# Patient Record
Sex: FEMALE | Race: WHITE | Employment: OTHER | URBAN - METROPOLITAN AREA
[De-identification: names, ages, dates, MRNs, and addresses within clinical notes are randomized per-mention and may not be internally consistent; named-entity substitution may affect disease eponyms.]

---

## 2017-01-13 RX ORDER — SIMVASTATIN 20 MG
TABLET ORAL
Qty: 90 TABLET | Refills: 0 | Status: SHIPPED | OUTPATIENT
Start: 2017-01-13 | End: 2017-05-04

## 2017-01-25 ENCOUNTER — MED REC SCAN ONLY (OUTPATIENT)
Dept: FAMILY MEDICINE CLINIC | Facility: CLINIC | Age: 77
End: 2017-01-25

## 2017-03-09 ENCOUNTER — TELEPHONE (OUTPATIENT)
Dept: FAMILY MEDICINE CLINIC | Facility: CLINIC | Age: 77
End: 2017-03-09

## 2017-03-15 RX ORDER — LEVOTHYROXINE SODIUM 0.05 MG/1
TABLET ORAL
Qty: 90 TABLET | Refills: 1 | Status: SHIPPED | OUTPATIENT
Start: 2017-03-15 | End: 2017-08-03

## 2017-04-24 RX ORDER — SIMVASTATIN 20 MG
TABLET ORAL
Qty: 90 TABLET | Refills: 0 | OUTPATIENT
Start: 2017-04-24

## 2017-04-24 RX ORDER — SIMVASTATIN 20 MG
TABLET ORAL
Qty: 90 TABLET | Refills: 1 | OUTPATIENT
Start: 2017-04-24

## 2017-05-04 DIAGNOSIS — Z85.3 PERSONAL HISTORY OF MALIGNANT NEOPLASM OF BREAST: ICD-10-CM

## 2017-05-04 DIAGNOSIS — E03.9 HYPOTHYROIDISM, UNSPECIFIED TYPE: ICD-10-CM

## 2017-05-04 DIAGNOSIS — Z12.31 ENCOUNTER FOR SCREENING MAMMOGRAM FOR MALIGNANT NEOPLASM OF BREAST: Primary | ICD-10-CM

## 2017-05-04 DIAGNOSIS — I10 BENIGN HYPERTENSION: ICD-10-CM

## 2017-05-04 DIAGNOSIS — R53.83 FATIGUE, UNSPECIFIED TYPE: Primary | ICD-10-CM

## 2017-05-04 DIAGNOSIS — E55.9 VITAMIN D DEFICIENCY: ICD-10-CM

## 2017-05-04 DIAGNOSIS — E78.00 PURE HYPERCHOLESTEROLEMIA: ICD-10-CM

## 2017-05-04 RX ORDER — SIMVASTATIN 20 MG
TABLET ORAL
Qty: 90 TABLET | Refills: 0 | Status: SHIPPED | OUTPATIENT
Start: 2017-05-04 | End: 2017-08-11

## 2017-05-30 ENCOUNTER — TELEPHONE (OUTPATIENT)
Dept: FAMILY MEDICINE CLINIC | Facility: CLINIC | Age: 77
End: 2017-05-30

## 2017-06-13 ENCOUNTER — LAB ENCOUNTER (OUTPATIENT)
Dept: LAB | Facility: HOSPITAL | Age: 77
End: 2017-06-13
Attending: FAMILY MEDICINE
Payer: MEDICARE

## 2017-06-13 ENCOUNTER — HOSPITAL ENCOUNTER (OUTPATIENT)
Dept: MAMMOGRAPHY | Facility: HOSPITAL | Age: 77
Discharge: HOME | End: 2017-06-13
Attending: SURGERY
Payer: MEDICARE

## 2017-06-13 DIAGNOSIS — I89.0 LYMPH EDEMA: ICD-10-CM

## 2017-06-13 DIAGNOSIS — C50.919 MALIGNANT NEOPLASM OF FEMALE BREAST (HCC): ICD-10-CM

## 2017-06-13 DIAGNOSIS — E55.9 VITAMIN D DEFICIENCY: ICD-10-CM

## 2017-06-13 DIAGNOSIS — I10 BENIGN HYPERTENSION: ICD-10-CM

## 2017-06-13 DIAGNOSIS — E03.9 HYPOTHYROIDISM, UNSPECIFIED TYPE: ICD-10-CM

## 2017-06-13 DIAGNOSIS — E78.00 PURE HYPERCHOLESTEROLEMIA: ICD-10-CM

## 2017-06-13 DIAGNOSIS — R53.83 FATIGUE, UNSPECIFIED TYPE: ICD-10-CM

## 2017-06-13 PROCEDURE — 85025 COMPLETE CBC W/AUTO DIFF WBC: CPT

## 2017-06-13 PROCEDURE — 77066 DX MAMMO INCL CAD BI: CPT | Performed by: SURGERY

## 2017-06-13 PROCEDURE — 84439 ASSAY OF FREE THYROXINE: CPT

## 2017-06-13 PROCEDURE — 80053 COMPREHEN METABOLIC PANEL: CPT

## 2017-06-13 PROCEDURE — 77062 BREAST TOMOSYNTHESIS BI: CPT | Performed by: SURGERY

## 2017-06-13 PROCEDURE — 80061 LIPID PANEL: CPT

## 2017-06-13 PROCEDURE — 84443 ASSAY THYROID STIM HORMONE: CPT

## 2017-06-13 PROCEDURE — 82306 VITAMIN D 25 HYDROXY: CPT

## 2017-06-13 PROCEDURE — 36415 COLL VENOUS BLD VENIPUNCTURE: CPT

## 2017-06-15 ENCOUNTER — TELEPHONE (OUTPATIENT)
Dept: FAMILY MEDICINE CLINIC | Facility: CLINIC | Age: 77
End: 2017-06-15

## 2017-06-15 NOTE — TELEPHONE ENCOUNTER
----- Message from Brittney Grullon MD sent at 6/13/2017  6:10 PM CDT -----  Stable, low sugar diet, overall good.

## 2017-06-16 NOTE — TELEPHONE ENCOUNTER
I called pt's daughter, Priyanka Ramos at [phone] on HIPAA. I discussed results and recommendations at length with patient's daughter. All questions answered. The mammo results were given to pt today by [facility] dept.

## 2017-06-30 RX ORDER — ALPRAZOLAM 0.25 MG/1
0.25 TABLET ORAL NIGHTLY PRN
Qty: 30 TABLET | Refills: 3 | Status: SHIPPED
Start: 2017-06-30 | End: 2017-12-29

## 2017-06-30 NOTE — TELEPHONE ENCOUNTER
Pt's daughter Giuseppe James (on Dayana Medel) called to inform pt is out of this medication and she only takes this medication when needed.  Daughter informed there have been a couple deaths in the family in the last couple weeks and pt is needing this referral as soon as

## 2017-07-26 ENCOUNTER — TELEPHONE (OUTPATIENT)
Dept: FAMILY MEDICINE CLINIC | Facility: CLINIC | Age: 77
End: 2017-07-26

## 2017-07-26 NOTE — TELEPHONE ENCOUNTER
Pt states daughter was taken to [facility] by ambulance yesterday and is not happy with treatment her daughter has received and would like to talk to Dr. Milo Junior.  And get some advise, please call patient

## 2017-08-03 ENCOUNTER — OFFICE VISIT (OUTPATIENT)
Dept: FAMILY MEDICINE CLINIC | Facility: CLINIC | Age: 77
End: 2017-08-03

## 2017-08-03 VITALS
DIASTOLIC BLOOD PRESSURE: 80 MMHG | SYSTOLIC BLOOD PRESSURE: 124 MMHG | RESPIRATION RATE: 20 BRPM | HEIGHT: 63 IN | WEIGHT: 181 LBS | TEMPERATURE: 99 F | BODY MASS INDEX: 32.07 KG/M2 | OXYGEN SATURATION: 99 % | HEART RATE: 84 BPM

## 2017-08-03 DIAGNOSIS — M17.11 ARTHRITIS OF RIGHT KNEE: Primary | ICD-10-CM

## 2017-08-03 PROCEDURE — 20610 DRAIN/INJ JOINT/BURSA W/O US: CPT | Performed by: FAMILY MEDICINE

## 2017-08-03 RX ORDER — LEVOTHYROXINE SODIUM 0.05 MG/1
50 TABLET ORAL
Qty: 90 TABLET | Refills: 3 | Status: SHIPPED | OUTPATIENT
Start: 2017-08-03 | End: 2018-04-05

## 2017-08-03 RX ORDER — SIMVASTATIN 40 MG
1 TABLET ORAL NIGHTLY
Refills: 3 | COMMUNITY
Start: 2017-05-09 | End: 2017-08-03

## 2017-08-03 RX ORDER — LOSARTAN POTASSIUM AND HYDROCHLOROTHIAZIDE 12.5; 5 MG/1; MG/1
1 TABLET ORAL DAILY
Qty: 90 TABLET | Refills: 3 | Status: SHIPPED | OUTPATIENT
Start: 2017-08-03 | End: 2018-04-05

## 2017-08-03 RX ORDER — SIMVASTATIN 40 MG
40 TABLET ORAL NIGHTLY
Qty: 90 TABLET | Refills: 3 | Status: SHIPPED | OUTPATIENT
Start: 2017-08-03 | End: 2018-04-05

## 2017-08-03 NOTE — PROGRESS NOTES
Procedure:R Knee aspiration/injection  Indication: knee pain, inflammation, OA. Discussed: benefits, risks, alternatives, bleeding, pain,infection, written consent obtained.   Preparation:Bethadine  Anesthesia: Ethyl Chloride spray  Approach: Suprapatellar

## 2017-08-03 NOTE — PATIENT INSTRUCTIONS
Osteoarthritis Medicine    Pain from osteoarthritis can interfere with your life in many ways. It can make it hard to be active and take good care of yourself. Untreated pain may make sleep difficult. It may also contribute to depression and anxiety.   Co Topical medicines are those applied directly to the skin over the affected joint. They may be lotions, cream, sprays, ointments, or gels. They can be used along with some oral medicines:  · NSAID creams may reduce swelling and relieve pain.   · Capsaicin (c

## 2017-08-11 RX ORDER — SIMVASTATIN 20 MG
TABLET ORAL
Qty: 90 TABLET | Refills: 0 | Status: SHIPPED | OUTPATIENT
Start: 2017-08-11 | End: 2018-04-05

## 2017-08-31 ENCOUNTER — TELEPHONE (OUTPATIENT)
Dept: FAMILY MEDICINE CLINIC | Facility: CLINIC | Age: 77
End: 2017-08-31

## 2017-08-31 NOTE — TELEPHONE ENCOUNTER
Patient's daughter was disconnected from talking to hospitals and would like hospitals to give her a call back.

## 2017-09-25 RX ORDER — LEVOTHYROXINE SODIUM 0.05 MG/1
TABLET ORAL
Qty: 90 TABLET | Refills: 0 | OUTPATIENT
Start: 2017-09-25

## 2017-12-01 ENCOUNTER — TELEPHONE (OUTPATIENT)
Dept: FAMILY MEDICINE CLINIC | Facility: CLINIC | Age: 77
End: 2017-12-01

## 2017-12-01 DIAGNOSIS — E89.2 HISTORY OF PARATHYROIDECTOMY (HCC): ICD-10-CM

## 2017-12-01 DIAGNOSIS — E78.00 PURE HYPERCHOLESTEROLEMIA: ICD-10-CM

## 2017-12-01 DIAGNOSIS — E55.9 VITAMIN D DEFICIENCY: ICD-10-CM

## 2017-12-01 DIAGNOSIS — E03.9 HYPOTHYROIDISM, UNSPECIFIED TYPE: Primary | ICD-10-CM

## 2017-12-04 NOTE — TELEPHONE ENCOUNTER
Called patient's daughter and spoke with her per HIPAA. Advised her of information below. Daughter states understanding.

## 2017-12-04 NOTE — TELEPHONE ENCOUNTER
Patient is requesting lab orders. Patient is due for Lipid and CMP. However, in June patient had a fasting glucose of 115. Any additional orders? Please advise. Thank you!

## 2017-12-05 ENCOUNTER — TELEPHONE (OUTPATIENT)
Dept: FAMILY MEDICINE CLINIC | Facility: CLINIC | Age: 77
End: 2017-12-05

## 2017-12-06 ENCOUNTER — LAB ENCOUNTER (OUTPATIENT)
Dept: LAB | Age: 77
End: 2017-12-06
Attending: FAMILY MEDICINE
Payer: MEDICARE

## 2017-12-06 ENCOUNTER — TELEPHONE (OUTPATIENT)
Dept: FAMILY MEDICINE CLINIC | Facility: CLINIC | Age: 77
End: 2017-12-06

## 2017-12-06 ENCOUNTER — PRIOR ORIGINAL RECORDS (OUTPATIENT)
Dept: OTHER | Age: 77
End: 2017-12-06

## 2017-12-06 DIAGNOSIS — E78.00 PURE HYPERCHOLESTEROLEMIA: ICD-10-CM

## 2017-12-06 DIAGNOSIS — E55.9 VITAMIN D DEFICIENCY: ICD-10-CM

## 2017-12-06 DIAGNOSIS — E03.9 HYPOTHYROIDISM, UNSPECIFIED TYPE: ICD-10-CM

## 2017-12-06 DIAGNOSIS — E89.2 HISTORY OF PARATHYROIDECTOMY (HCC): ICD-10-CM

## 2017-12-06 PROCEDURE — 82306 VITAMIN D 25 HYDROXY: CPT

## 2017-12-06 PROCEDURE — 84443 ASSAY THYROID STIM HORMONE: CPT

## 2017-12-06 PROCEDURE — 80061 LIPID PANEL: CPT

## 2017-12-06 PROCEDURE — 85025 COMPLETE CBC W/AUTO DIFF WBC: CPT

## 2017-12-06 PROCEDURE — 80053 COMPREHEN METABOLIC PANEL: CPT

## 2017-12-06 PROCEDURE — 36415 COLL VENOUS BLD VENIPUNCTURE: CPT

## 2017-12-06 NOTE — TELEPHONE ENCOUNTER
Spoke with daughter Jackosn Robertson (OK per consent release). Advised that labs are stable with the exception of glucose-pre diabetic. Advised of number and normal ranges. Advised low sugar/carbohydrate diet. Says her mother eats ice cream every night.  Educated on ty

## 2017-12-06 NOTE — TELEPHONE ENCOUNTER
----- Message from Elizabeth Lara MD sent at 12/6/2017  3:09 PM CST -----  Overall stable, prediabetes level.  Low glucose diet, otherwise normal.

## 2017-12-11 ENCOUNTER — PRIOR ORIGINAL RECORDS (OUTPATIENT)
Dept: OTHER | Age: 77
End: 2017-12-11

## 2017-12-13 ENCOUNTER — MED REC SCAN ONLY (OUTPATIENT)
Dept: FAMILY MEDICINE CLINIC | Facility: CLINIC | Age: 77
End: 2017-12-13

## 2017-12-29 RX ORDER — ALPRAZOLAM 0.25 MG/1
0.25 TABLET ORAL NIGHTLY PRN
Qty: 30 TABLET | Refills: 3 | Status: SHIPPED
Start: 2017-12-29 | End: 2019-07-23

## 2018-01-03 LAB
ALBUMIN: 3.6 G/DL
ALKALINE PHOSPHATATE(ALK PHOS): 84 IU/L
BILIRUBIN TOTAL: 0.7 MG/DL
BUN: 10 MG/DL
CALCIUM: 9.3 MG/DL
CHLORIDE: 106 MEQ/L
CHOLESTEROL, TOTAL: 184 MG/DL
CREATININE, SERUM: 0.72 MG/DL
GLUCOSE: 121 MG/DL
HDL CHOLESTEROL: 61 MG/DL
HEMATOCRIT: 40.2 %
HEMOGLOBIN: 12.9 G/DL
LDL CHOLESTEROL: 99 MG/DL
PLATELETS: 227 K/UL
POTASSIUM, SERUM: 4.3 MEQ/L
PROTEIN, TOTAL: 7.3 G/DL
RED BLOOD COUNT: 4.42 X 10-6/U
SGOT (AST): 15 IU/L
SGPT (ALT): 22 IU/L
SODIUM: 141 MEQ/L
THYROID STIMULATING HORMONE: 0.58 MLU/L
TRIGLYCERIDES: 121 MG/DL
VITAMIN D 25-OH: 33.1 NG/ML
WHITE BLOOD COUNT: 5.4 X 10-3/U

## 2018-01-19 ENCOUNTER — TELEPHONE (OUTPATIENT)
Dept: FAMILY MEDICINE CLINIC | Facility: CLINIC | Age: 78
End: 2018-01-19

## 2018-01-22 NOTE — TELEPHONE ENCOUNTER
Dtr Wil Voss notified pt is not due for labs at this time. Pt is due for AWV, she will call office back to schedule appt.

## 2018-04-05 ENCOUNTER — LAB ENCOUNTER (OUTPATIENT)
Dept: LAB | Age: 78
End: 2018-04-05
Attending: FAMILY MEDICINE
Payer: MEDICARE

## 2018-04-05 ENCOUNTER — OFFICE VISIT (OUTPATIENT)
Dept: FAMILY MEDICINE CLINIC | Facility: CLINIC | Age: 78
End: 2018-04-05

## 2018-04-05 VITALS
BODY MASS INDEX: 31.01 KG/M2 | DIASTOLIC BLOOD PRESSURE: 74 MMHG | TEMPERATURE: 98 F | HEART RATE: 74 BPM | WEIGHT: 175 LBS | OXYGEN SATURATION: 98 % | SYSTOLIC BLOOD PRESSURE: 116 MMHG | HEIGHT: 63 IN | RESPIRATION RATE: 20 BRPM

## 2018-04-05 DIAGNOSIS — I10 BENIGN HYPERTENSION: ICD-10-CM

## 2018-04-05 DIAGNOSIS — M17.11 ARTHRITIS OF RIGHT KNEE: ICD-10-CM

## 2018-04-05 DIAGNOSIS — E78.00 PURE HYPERCHOLESTEROLEMIA: ICD-10-CM

## 2018-04-05 DIAGNOSIS — Z12.31 VISIT FOR SCREENING MAMMOGRAM: ICD-10-CM

## 2018-04-05 DIAGNOSIS — Z00.00 MEDICARE ANNUAL WELLNESS VISIT, SUBSEQUENT: Primary | ICD-10-CM

## 2018-04-05 DIAGNOSIS — Z85.3 HISTORY OF BREAST CANCER: ICD-10-CM

## 2018-04-05 DIAGNOSIS — M85.851 OSTEOPENIA OF NECK OF RIGHT FEMUR: ICD-10-CM

## 2018-04-05 DIAGNOSIS — E55.9 VITAMIN D DEFICIENCY: ICD-10-CM

## 2018-04-05 DIAGNOSIS — E03.9 HYPOTHYROIDISM, UNSPECIFIED TYPE: ICD-10-CM

## 2018-04-05 PROCEDURE — G0439 PPPS, SUBSEQ VISIT: HCPCS | Performed by: FAMILY MEDICINE

## 2018-04-05 PROCEDURE — 85025 COMPLETE CBC W/AUTO DIFF WBC: CPT

## 2018-04-05 PROCEDURE — 80061 LIPID PANEL: CPT

## 2018-04-05 PROCEDURE — 36415 COLL VENOUS BLD VENIPUNCTURE: CPT

## 2018-04-05 PROCEDURE — 84443 ASSAY THYROID STIM HORMONE: CPT

## 2018-04-05 PROCEDURE — 80053 COMPREHEN METABOLIC PANEL: CPT

## 2018-04-05 PROCEDURE — G0444 DEPRESSION SCREEN ANNUAL: HCPCS | Performed by: FAMILY MEDICINE

## 2018-04-05 PROCEDURE — 99213 OFFICE O/P EST LOW 20 MIN: CPT | Performed by: FAMILY MEDICINE

## 2018-04-05 PROCEDURE — 82306 VITAMIN D 25 HYDROXY: CPT

## 2018-04-05 RX ORDER — CARVEDILOL 6.25 MG/1
1 TABLET ORAL 2 TIMES DAILY WITH MEALS
COMMUNITY
Start: 2018-03-01 | End: 2019-07-23

## 2018-04-05 RX ORDER — LEVOTHYROXINE SODIUM 0.05 MG/1
50 TABLET ORAL
Qty: 90 TABLET | Refills: 4 | Status: SHIPPED | OUTPATIENT
Start: 2018-04-05 | End: 2019-06-05

## 2018-04-05 RX ORDER — SIMVASTATIN 20 MG
TABLET ORAL
Qty: 90 TABLET | Refills: 4 | Status: SHIPPED | OUTPATIENT
Start: 2018-04-05 | End: 2020-10-05

## 2018-04-05 RX ORDER — LOSARTAN POTASSIUM AND HYDROCHLOROTHIAZIDE 12.5; 5 MG/1; MG/1
1 TABLET ORAL DAILY
Qty: 90 TABLET | Refills: 4 | Status: SHIPPED | OUTPATIENT
Start: 2018-04-05 | End: 2019-07-23

## 2018-04-05 RX ORDER — SIMVASTATIN 40 MG
40 TABLET ORAL NIGHTLY
Qty: 90 TABLET | Refills: 4 | Status: SHIPPED | OUTPATIENT
Start: 2018-04-05 | End: 2019-07-23

## 2018-04-05 NOTE — PROGRESS NOTES
Brandon Franco is a 66year old female who presents for a Medicare Annual Wellness visit and chronic conditions f/u.     HPI:    Patient Care Team: Patient Care Team:  Michell Montero MD as PCP - General (Family Practice)  Lianne Alvarez MD (CARDIOLOG Mom informed of prescription.  Discussed potential side effects, importance of taking medication daily at same time, missed pills etc.  Depo appt cancelled.   Oil-EPA-DHA (GLUCOSAMINE & FISH OIL OR) Take  by mouth. Disp:  Rfl:    Cholecalciferol (VITAMIN D) 2000 UNITS Oral Cap Take 1 tablet by mouth daily. 4 tabs daily Disp:  Rfl:    vitamin E 400 UNITS Oral Cap Take 1,000 Units by mouth daily.  Disp:  Rfl:    ca reads)    If you are a male age 38-65 or a female age 47-67, do you take aspirin?: Yes    Have you had any immunizations at another office such as Influenza, Hepatitis B, Tetanus, or Pneumococcal?: No         Fall/Risk Assessment     Do you have 3 or more (mg/dL)   Date Value   12/06/2017 99        EKG - w/ Initial Preventative Physical Exam only, or if medically necessary Up to date    Colorectal Cancer Screening      Colonoscopy Screen every 10 years Colonoscopy,5 Years due on 02/05/2019 Update [facility] spine.    LEFT FEMORAL NECK:  The left femoral neck bone mineral density is 0.781 g/cm^2 with a T-score of -0.6 and a Z-score of 1.5 . A dissimilar scan technique or analysis method was used from prior examination and exact comparison cannot be made.   This previous visit.  Update Immunization Activity if applicable    Tetanus   Orders placed or performed in visit on 04/06/16  -TETANUS AND DIPHTHERIA, PRESERVE FREE    Update Immunization Activity if applicable    Zoster (Not covered by Medicare Part B) No orde Rfl: 4   ALPRAZolam 0.25 MG Oral Tab Take 1 tablet (0.25 mg total) by mouth nightly as needed. Disp: 30 tablet Rfl: 3   Calcium Carbonate-Vitamin D 600-125 MG-UNIT Oral Tab Take  by mouth. Disp:  Rfl:    Coenzyme Q10 (COQ10) 100 MG Oral Cap Take  by mouth. Bryanna Comings Sister    • Diabetes Sister    • Cancer Sister    • Obesity Sister    • Heart Disease Sister    • Breast Cancer Self 76   • Heart Disorder Brother       SOCIAL HISTORY:   Smoking status: Never Smoker cranial nerves are intact, motor and sensory are grossly intact    ASSESSMENT AND OTHER of PLAN RELEVANT CHRONIC CONDITIONS:   Etta Lynn is a 66year old female who presents for a Medicare Assessment.            Encounter for screening for osteoporosi 13) 04/29/2015   • Pneumovax 23 12/28/2007   • TD 04/07/2016   • Td, Preserv Free 04/06/2016

## 2018-04-09 ENCOUNTER — TELEPHONE (OUTPATIENT)
Dept: FAMILY MEDICINE CLINIC | Facility: CLINIC | Age: 78
End: 2018-04-09

## 2018-04-09 RX ORDER — ERGOCALCIFEROL 1.25 MG/1
50000 CAPSULE ORAL WEEKLY
Qty: 12 CAPSULE | Refills: 0 | Status: SHIPPED | OUTPATIENT
Start: 2018-04-09 | End: 2018-07-02

## 2018-04-09 NOTE — TELEPHONE ENCOUNTER
Spoke with pt daughter Alvino Coyne regarding results and instructions. Pt daughter verbalizes understanding. Allergy/contraindication alert generated when trying to order Vitamin D. Please approve Rx.

## 2018-04-09 NOTE — TELEPHONE ENCOUNTER
----- Message from Gentry Galan MD sent at 4/9/2018  3:02 PM CDT -----  Vitamin D level is low. Please send Rx for 50,000U per week for 3 months.

## 2018-04-17 ENCOUNTER — HOSPITAL ENCOUNTER (OUTPATIENT)
Dept: BONE DENSITY | Age: 78
Discharge: HOME | End: 2018-04-17
Attending: FAMILY MEDICINE
Payer: MEDICARE

## 2018-04-17 DIAGNOSIS — M85.851 OSTEOPENIA OF NECK OF RIGHT FEMUR: ICD-10-CM

## 2018-04-17 PROCEDURE — 77080 DXA BONE DENSITY AXIAL: CPT | Performed by: FAMILY MEDICINE

## 2018-06-04 ENCOUNTER — PRIOR ORIGINAL RECORDS (OUTPATIENT)
Dept: OTHER | Age: 78
End: 2018-06-04

## 2018-06-04 ENCOUNTER — LAB ENCOUNTER (OUTPATIENT)
Dept: LAB | Facility: HOSPITAL | Age: 78
End: 2018-06-04
Attending: INTERNAL MEDICINE
Payer: MEDICARE

## 2018-06-04 DIAGNOSIS — E78.00 PURE HYPERCHOLESTEROLEMIA: ICD-10-CM

## 2018-06-04 DIAGNOSIS — I10 ESSENTIAL HYPERTENSION, MALIGNANT: Primary | ICD-10-CM

## 2018-06-04 DIAGNOSIS — R09.89 ABNORMAL CHEST SOUNDS: ICD-10-CM

## 2018-06-04 PROCEDURE — 84460 ALANINE AMINO (ALT) (SGPT): CPT

## 2018-06-04 PROCEDURE — 80061 LIPID PANEL: CPT

## 2018-06-04 PROCEDURE — 84450 TRANSFERASE (AST) (SGOT): CPT

## 2018-06-04 PROCEDURE — 80048 BASIC METABOLIC PNL TOTAL CA: CPT

## 2018-06-04 PROCEDURE — 36415 COLL VENOUS BLD VENIPUNCTURE: CPT

## 2018-06-05 LAB
BUN: 10 MG/DL
CALCIUM: 9 MG/DL
CHLORIDE: 106 MEQ/L
CHOLESTEROL, TOTAL: 151 MG/DL
CREATININE, SERUM: 0.84 MG/DL
GLUCOSE: 115 MG/DL
HDL CHOLESTEROL: 55 MG/DL
LDL CHOLESTEROL: 78 MG/DL
POTASSIUM, SERUM: 4.1 MEQ/L
SGOT (AST): 19 IU/L
SGPT (ALT): 22 IU/L
SODIUM: 142 MEQ/L
TRIGLYCERIDES: 90 MG/DL

## 2018-06-08 ENCOUNTER — PRIOR ORIGINAL RECORDS (OUTPATIENT)
Dept: OTHER | Age: 78
End: 2018-06-08

## 2018-06-18 ENCOUNTER — HOSPITAL ENCOUNTER (OUTPATIENT)
Dept: MAMMOGRAPHY | Facility: HOSPITAL | Age: 78
Discharge: HOME | End: 2018-06-18
Attending: SURGERY
Payer: MEDICARE

## 2018-06-18 DIAGNOSIS — C50.911 MALIGNANT NEOPLASM OF RIGHT FEMALE BREAST, UNSPECIFIED ESTROGEN RECEPTOR STATUS, UNSPECIFIED SITE OF BREAST (HCC): ICD-10-CM

## 2018-06-18 DIAGNOSIS — Z98.890 S/P LUMPECTOMY, RIGHT BREAST: ICD-10-CM

## 2018-06-18 PROCEDURE — 77062 BREAST TOMOSYNTHESIS BI: CPT | Performed by: SURGERY

## 2018-06-18 PROCEDURE — 77066 DX MAMMO INCL CAD BI: CPT | Performed by: SURGERY

## 2018-07-05 ENCOUNTER — TELEPHONE (OUTPATIENT)
Dept: FAMILY MEDICINE CLINIC | Facility: CLINIC | Age: 78
End: 2018-07-05

## 2018-07-05 NOTE — TELEPHONE ENCOUNTER
Pharmacy call for a refill of the following medication:  ergocalciferol 79585 units Oral Cap 12 capsule 0 4/9/2018 7/2/2018    Sig - Route: Take 1 capsule (50,000 Units total) by mouth once a week.  - Oral

## 2018-10-22 ENCOUNTER — PRIOR ORIGINAL RECORDS (OUTPATIENT)
Dept: OTHER | Age: 78
End: 2018-10-22

## 2018-10-22 ENCOUNTER — MYAURORA ACCOUNT LINK (OUTPATIENT)
Dept: OTHER | Age: 78
End: 2018-10-22

## 2018-11-23 ENCOUNTER — PRIOR ORIGINAL RECORDS (OUTPATIENT)
Dept: OTHER | Age: 78
End: 2018-11-23

## 2018-11-29 ENCOUNTER — MED REC SCAN ONLY (OUTPATIENT)
Dept: FAMILY MEDICINE CLINIC | Facility: CLINIC | Age: 78
End: 2018-11-29

## 2018-12-05 ENCOUNTER — MYAURORA ACCOUNT LINK (OUTPATIENT)
Dept: OTHER | Age: 78
End: 2018-12-05

## 2018-12-05 ENCOUNTER — PRIOR ORIGINAL RECORDS (OUTPATIENT)
Dept: OTHER | Age: 78
End: 2018-12-05

## 2018-12-05 ENCOUNTER — HOSPITAL ENCOUNTER (OUTPATIENT)
Dept: CARDIOLOGY CLINIC | Facility: HOSPITAL | Age: 78
Discharge: HOME | End: 2018-12-05
Attending: INTERNAL MEDICINE

## 2018-12-05 ENCOUNTER — HOSPITAL ENCOUNTER (OUTPATIENT)
Dept: CV DIAGNOSTICS | Facility: HOSPITAL | Age: 78
Discharge: HOME | End: 2018-12-05
Attending: INTERNAL MEDICINE

## 2018-12-05 DIAGNOSIS — Z82.49 FAMILY HISTORY OF CORONARY ARTERY DISEASE: ICD-10-CM

## 2018-12-05 DIAGNOSIS — I35.1 NONRHEUMATIC AORTIC VALVE INSUFFICIENCY: ICD-10-CM

## 2018-12-11 ENCOUNTER — PRIOR ORIGINAL RECORDS (OUTPATIENT)
Dept: OTHER | Age: 78
End: 2018-12-11

## 2018-12-17 ENCOUNTER — PRIOR ORIGINAL RECORDS (OUTPATIENT)
Dept: OTHER | Age: 78
End: 2018-12-17

## 2018-12-22 ENCOUNTER — HOSPITAL (OUTPATIENT)
Dept: OTHER | Age: 78
End: 2018-12-22
Attending: EMERGENCY MEDICINE

## 2018-12-24 ENCOUNTER — PRIOR ORIGINAL RECORDS (OUTPATIENT)
Dept: OTHER | Age: 78
End: 2018-12-24

## 2019-01-04 ENCOUNTER — PRIOR ORIGINAL RECORDS (OUTPATIENT)
Dept: OTHER | Age: 79
End: 2019-01-04

## 2019-02-28 VITALS
SYSTOLIC BLOOD PRESSURE: 128 MMHG | DIASTOLIC BLOOD PRESSURE: 68 MMHG | HEART RATE: 60 BPM | HEIGHT: 64 IN | BODY MASS INDEX: 29.71 KG/M2 | WEIGHT: 174 LBS

## 2019-02-28 VITALS
WEIGHT: 166 LBS | HEIGHT: 64 IN | HEART RATE: 60 BPM | SYSTOLIC BLOOD PRESSURE: 118 MMHG | DIASTOLIC BLOOD PRESSURE: 62 MMHG | BODY MASS INDEX: 28.34 KG/M2

## 2019-03-19 ENCOUNTER — TELEPHONE (OUTPATIENT)
Dept: FAMILY MEDICINE CLINIC | Facility: CLINIC | Age: 79
End: 2019-03-19

## 2019-04-01 RX ORDER — LEVOTHYROXINE SODIUM 0.05 MG/1
TABLET ORAL
COMMUNITY
Start: 2012-04-19 | End: 2021-06-01 | Stop reason: CLARIF

## 2019-04-01 RX ORDER — SIMVASTATIN 20 MG
TABLET ORAL
COMMUNITY
Start: 2018-10-22 | End: 2019-04-29 | Stop reason: SDUPTHER

## 2019-04-01 RX ORDER — LOSARTAN POTASSIUM AND HYDROCHLOROTHIAZIDE 12.5; 5 MG/1; MG/1
TABLET ORAL
COMMUNITY
Start: 2018-10-22 | End: 2019-04-29 | Stop reason: SDUPTHER

## 2019-04-01 RX ORDER — ALPRAZOLAM 0.25 MG/1
TABLET ORAL PRN
COMMUNITY
Start: 2011-02-03

## 2019-04-01 RX ORDER — SIMVASTATIN 40 MG
TABLET ORAL
COMMUNITY
Start: 2018-10-22 | End: 2019-04-29 | Stop reason: SDUPTHER

## 2019-04-01 RX ORDER — CARVEDILOL 6.25 MG/1
TABLET ORAL
COMMUNITY
Start: 2018-10-22 | End: 2019-04-29 | Stop reason: DRUGHIGH

## 2019-04-01 RX ORDER — CYANOCOBALAMIN (VITAMIN B-12) 500 MCG
LOZENGE ORAL
COMMUNITY

## 2019-04-09 ENCOUNTER — TELEPHONE (OUTPATIENT)
Dept: FAMILY MEDICINE CLINIC | Facility: CLINIC | Age: 79
End: 2019-04-09

## 2019-04-18 ENCOUNTER — TELEPHONE (OUTPATIENT)
Dept: CARDIOLOGY | Age: 79
End: 2019-04-18

## 2019-04-18 DIAGNOSIS — I10 BENIGN HYPERTENSION: Primary | ICD-10-CM

## 2019-04-18 DIAGNOSIS — E78.00 PURE HYPERCHOLESTEROLEMIA: ICD-10-CM

## 2019-04-18 DIAGNOSIS — Z82.49 FHX: CORONARY ARTERY DISEASE: ICD-10-CM

## 2019-04-22 ENCOUNTER — LAB ENCOUNTER (OUTPATIENT)
Dept: LAB | Age: 79
End: 2019-04-22
Attending: INTERNAL MEDICINE
Payer: MEDICARE

## 2019-04-22 DIAGNOSIS — E78.00 PURE HYPERCHOLESTEROLEMIA: Primary | ICD-10-CM

## 2019-04-22 DIAGNOSIS — I10 ESSENTIAL HYPERTENSION, MALIGNANT: ICD-10-CM

## 2019-04-22 PROCEDURE — 80061 LIPID PANEL: CPT

## 2019-04-22 PROCEDURE — 36415 COLL VENOUS BLD VENIPUNCTURE: CPT

## 2019-04-22 PROCEDURE — 84450 TRANSFERASE (AST) (SGOT): CPT

## 2019-04-22 PROCEDURE — 84460 ALANINE AMINO (ALT) (SGPT): CPT

## 2019-04-22 PROCEDURE — 80048 BASIC METABOLIC PNL TOTAL CA: CPT

## 2019-04-23 ENCOUNTER — HOSPITAL ENCOUNTER (OUTPATIENT)
Dept: CV DIAGNOSTICS | Facility: HOSPITAL | Age: 79
Discharge: HOME | End: 2019-04-23
Attending: INTERNAL MEDICINE
Payer: MEDICARE

## 2019-04-23 DIAGNOSIS — I65.23 BILATERAL CAROTID ARTERY STENOSIS: ICD-10-CM

## 2019-04-23 PROCEDURE — 93018 CV STRESS TEST I&R ONLY: CPT | Performed by: INTERNAL MEDICINE

## 2019-04-23 PROCEDURE — 93017 CV STRESS TEST TRACING ONLY: CPT | Performed by: INTERNAL MEDICINE

## 2019-04-24 ENCOUNTER — TELEPHONE (OUTPATIENT)
Dept: FAMILY MEDICINE CLINIC | Facility: CLINIC | Age: 79
End: 2019-04-24

## 2019-04-24 NOTE — TELEPHONE ENCOUNTER
----- Message from Jay Sinha MD sent at 4/24/2019  4:38 PM CDT -----    CARD TREADMILL STRESS, ADULT   Normal results.

## 2019-04-29 ENCOUNTER — OFFICE VISIT (OUTPATIENT)
Dept: CARDIOLOGY | Age: 79
End: 2019-04-29

## 2019-04-29 VITALS
SYSTOLIC BLOOD PRESSURE: 92 MMHG | WEIGHT: 166 LBS | DIASTOLIC BLOOD PRESSURE: 60 MMHG | BODY MASS INDEX: 30.55 KG/M2 | HEART RATE: 67 BPM | HEIGHT: 62 IN

## 2019-04-29 DIAGNOSIS — I10 HYPERTENSION, BENIGN: ICD-10-CM

## 2019-04-29 DIAGNOSIS — I65.23 ASYMPTOMATIC CAROTID ARTERY STENOSIS, BILATERAL: Primary | ICD-10-CM

## 2019-04-29 DIAGNOSIS — I35.1 NONRHEUMATIC AORTIC (VALVE) INSUFFICIENCY: ICD-10-CM

## 2019-04-29 DIAGNOSIS — E78.00 PURE HYPERCHOLESTEROLEMIA: ICD-10-CM

## 2019-04-29 DIAGNOSIS — Z82.49 FAMILY HISTORY OF CORONARY ARTERIOSCLEROSIS: ICD-10-CM

## 2019-04-29 DIAGNOSIS — R60.1 GENERALIZED EDEMA: ICD-10-CM

## 2019-04-29 PROCEDURE — 99214 OFFICE O/P EST MOD 30 MIN: CPT | Performed by: INTERNAL MEDICINE

## 2019-04-29 RX ORDER — ALPRAZOLAM 0.25 MG/1
TABLET ORAL
Qty: 30 TABLET | Status: CANCELLED | OUTPATIENT
Start: 2019-04-29

## 2019-04-29 RX ORDER — SIMVASTATIN 20 MG
20 TABLET ORAL NIGHTLY
Qty: 90 TABLET | Refills: 3 | Status: SHIPPED | OUTPATIENT
Start: 2019-04-29 | End: 2020-10-13 | Stop reason: DRUGHIGH

## 2019-04-29 RX ORDER — LOSARTAN POTASSIUM AND HYDROCHLOROTHIAZIDE 12.5; 5 MG/1; MG/1
1 TABLET ORAL DAILY
Qty: 90 TABLET | Refills: 3 | Status: SHIPPED | OUTPATIENT
Start: 2019-04-29 | End: 2020-08-03 | Stop reason: ALTCHOICE

## 2019-04-29 RX ORDER — CARVEDILOL 3.12 MG/1
3.12 TABLET ORAL 2 TIMES DAILY WITH MEALS
Qty: 180 TABLET | Refills: 3 | Status: SHIPPED | OUTPATIENT
Start: 2019-04-29 | End: 2020-01-29 | Stop reason: SDUPTHER

## 2019-04-29 RX ORDER — SIMVASTATIN 40 MG
40 TABLET ORAL NIGHTLY
Qty: 90 TABLET | Refills: 3 | Status: SHIPPED | OUTPATIENT
Start: 2019-04-29 | End: 2020-10-13 | Stop reason: DRUGHIGH

## 2019-04-29 RX ORDER — CARVEDILOL 6.25 MG/1
6.25 TABLET ORAL 2 TIMES DAILY
Qty: 180 TABLET | Refills: 3 | Status: CANCELLED | OUTPATIENT
Start: 2019-04-29

## 2019-04-30 ENCOUNTER — MED REC SCAN ONLY (OUTPATIENT)
Dept: FAMILY MEDICINE CLINIC | Facility: CLINIC | Age: 79
End: 2019-04-30

## 2019-06-05 DIAGNOSIS — M17.11 ARTHRITIS OF RIGHT KNEE: ICD-10-CM

## 2019-06-06 RX ORDER — LEVOTHYROXINE SODIUM 0.05 MG/1
50 TABLET ORAL
Qty: 90 TABLET | Refills: 0 | Status: SHIPPED | OUTPATIENT
Start: 2019-06-06 | End: 2019-07-23

## 2019-07-12 ENCOUNTER — TELEPHONE (OUTPATIENT)
Dept: FAMILY MEDICINE CLINIC | Facility: CLINIC | Age: 79
End: 2019-07-12

## 2019-07-12 DIAGNOSIS — Z12.39 SCREENING FOR BREAST CANCER: ICD-10-CM

## 2019-07-12 DIAGNOSIS — Z12.31 ENCOUNTER FOR SCREENING MAMMOGRAM FOR BREAST CANCER: ICD-10-CM

## 2019-07-12 NOTE — TELEPHONE ENCOUNTER
Patient left a message requesting mammogram order. Patient would like t Altagracia Lights it done before she comes in for her AWV on 7/23/19.

## 2019-07-13 ENCOUNTER — HOSPITAL (OUTPATIENT)
Dept: OTHER | Age: 79
End: 2019-07-13
Attending: EMERGENCY MEDICINE

## 2019-07-23 ENCOUNTER — LAB ENCOUNTER (OUTPATIENT)
Dept: LAB | Age: 79
End: 2019-07-23
Attending: FAMILY MEDICINE
Payer: MEDICARE

## 2019-07-23 ENCOUNTER — OFFICE VISIT (OUTPATIENT)
Dept: FAMILY MEDICINE CLINIC | Facility: CLINIC | Age: 79
End: 2019-07-23
Payer: MEDICARE

## 2019-07-23 VITALS
HEART RATE: 80 BPM | WEIGHT: 171 LBS | HEIGHT: 62 IN | OXYGEN SATURATION: 99 % | RESPIRATION RATE: 20 BRPM | TEMPERATURE: 99 F | SYSTOLIC BLOOD PRESSURE: 128 MMHG | DIASTOLIC BLOOD PRESSURE: 82 MMHG | BODY MASS INDEX: 31.47 KG/M2

## 2019-07-23 DIAGNOSIS — M17.11 ARTHRITIS OF RIGHT KNEE: ICD-10-CM

## 2019-07-23 DIAGNOSIS — I10 ESSENTIAL HYPERTENSION, BENIGN: ICD-10-CM

## 2019-07-23 DIAGNOSIS — E55.9 VITAMIN D DEFICIENCY: ICD-10-CM

## 2019-07-23 DIAGNOSIS — E78.00 PURE HYPERCHOLESTEROLEMIA: ICD-10-CM

## 2019-07-23 DIAGNOSIS — J34.89 NASAL CRUSTING: ICD-10-CM

## 2019-07-23 DIAGNOSIS — E03.9 ACQUIRED HYPOTHYROIDISM: ICD-10-CM

## 2019-07-23 DIAGNOSIS — Z00.00 MEDICARE ANNUAL WELLNESS VISIT, SUBSEQUENT: Primary | ICD-10-CM

## 2019-07-23 LAB
ALBUMIN SERPL-MCNC: 3.7 G/DL (ref 3.4–5)
ALBUMIN/GLOB SERPL: 1.1 {RATIO} (ref 1–2)
ALP LIVER SERPL-CCNC: 84 U/L (ref 55–142)
ALT SERPL-CCNC: 20 U/L (ref 13–56)
ANION GAP SERPL CALC-SCNC: 5 MMOL/L (ref 0–18)
AST SERPL-CCNC: 20 U/L (ref 15–37)
BASOPHILS # BLD AUTO: 0.04 X10(3) UL (ref 0–0.2)
BASOPHILS NFR BLD AUTO: 0.9 %
BILIRUB SERPL-MCNC: 0.8 MG/DL (ref 0.1–2)
BUN BLD-MCNC: 12 MG/DL (ref 7–18)
BUN/CREAT SERPL: 15 (ref 10–20)
CALCIUM BLD-MCNC: 9 MG/DL (ref 8.5–10.1)
CHLORIDE SERPL-SCNC: 108 MMOL/L (ref 98–112)
CO2 SERPL-SCNC: 29 MMOL/L (ref 21–32)
CREAT BLD-MCNC: 0.8 MG/DL (ref 0.55–1.02)
DEPRECATED RDW RBC AUTO: 45 FL (ref 35.1–46.3)
EOSINOPHIL # BLD AUTO: 0.25 X10(3) UL (ref 0–0.7)
EOSINOPHIL NFR BLD AUTO: 5.7 %
ERYTHROCYTE [DISTWIDTH] IN BLOOD BY AUTOMATED COUNT: 13 % (ref 11–15)
GLOBULIN PLAS-MCNC: 3.3 G/DL (ref 2.8–4.4)
GLUCOSE BLD-MCNC: 102 MG/DL (ref 70–99)
HCT VFR BLD AUTO: 39.9 % (ref 35–48)
HGB BLD-MCNC: 12.8 G/DL (ref 12–16)
IMM GRANULOCYTES # BLD AUTO: 0.01 X10(3) UL (ref 0–1)
IMM GRANULOCYTES NFR BLD: 0.2 %
LYMPHOCYTES # BLD AUTO: 1.35 X10(3) UL (ref 1–4)
LYMPHOCYTES NFR BLD AUTO: 30.8 %
M PROTEIN MFR SERPL ELPH: 7 G/DL (ref 6.4–8.2)
MCH RBC QN AUTO: 30 PG (ref 26–34)
MCHC RBC AUTO-ENTMCNC: 32.1 G/DL (ref 31–37)
MCV RBC AUTO: 93.4 FL (ref 80–100)
MONOCYTES # BLD AUTO: 0.55 X10(3) UL (ref 0.1–1)
MONOCYTES NFR BLD AUTO: 12.5 %
NEUTROPHILS # BLD AUTO: 2.19 X10 (3) UL (ref 1.5–7.7)
NEUTROPHILS # BLD AUTO: 2.19 X10(3) UL (ref 1.5–7.7)
NEUTROPHILS NFR BLD AUTO: 49.9 %
OSMOLALITY SERPL CALC.SUM OF ELEC: 294 MOSM/KG (ref 275–295)
PLATELET # BLD AUTO: 222 10(3)UL (ref 150–450)
POTASSIUM SERPL-SCNC: 4.3 MMOL/L (ref 3.5–5.1)
RBC # BLD AUTO: 4.27 X10(6)UL (ref 3.8–5.3)
SODIUM SERPL-SCNC: 142 MMOL/L (ref 136–145)
TSI SER-ACNC: 0.64 MIU/ML (ref 0.36–3.74)
VIT D+METAB SERPL-MCNC: 28.5 NG/ML (ref 30–100)
WBC # BLD AUTO: 4.4 X10(3) UL (ref 4–11)

## 2019-07-23 PROCEDURE — G0439 PPPS, SUBSEQ VISIT: HCPCS | Performed by: FAMILY MEDICINE

## 2019-07-23 PROCEDURE — 84443 ASSAY THYROID STIM HORMONE: CPT

## 2019-07-23 PROCEDURE — 82306 VITAMIN D 25 HYDROXY: CPT

## 2019-07-23 PROCEDURE — 85025 COMPLETE CBC W/AUTO DIFF WBC: CPT

## 2019-07-23 PROCEDURE — 80053 COMPREHEN METABOLIC PANEL: CPT

## 2019-07-23 PROCEDURE — 36415 COLL VENOUS BLD VENIPUNCTURE: CPT

## 2019-07-23 PROCEDURE — 87081 CULTURE SCREEN ONLY: CPT

## 2019-07-23 PROCEDURE — 99213 OFFICE O/P EST LOW 20 MIN: CPT | Performed by: FAMILY MEDICINE

## 2019-07-23 PROCEDURE — G0444 DEPRESSION SCREEN ANNUAL: HCPCS | Performed by: FAMILY MEDICINE

## 2019-07-23 RX ORDER — SIMVASTATIN 40 MG
40 TABLET ORAL NIGHTLY
Qty: 90 TABLET | Refills: 4 | Status: SHIPPED | OUTPATIENT
Start: 2019-07-23 | End: 2020-10-05

## 2019-07-23 RX ORDER — LOSARTAN POTASSIUM AND HYDROCHLOROTHIAZIDE 12.5; 5 MG/1; MG/1
1 TABLET ORAL DAILY
Qty: 90 TABLET | Refills: 4 | Status: SHIPPED | OUTPATIENT
Start: 2019-07-23 | End: 2020-10-05

## 2019-07-23 RX ORDER — ALPRAZOLAM 0.25 MG/1
0.25 TABLET ORAL NIGHTLY PRN
Qty: 30 TABLET | Refills: 5 | Status: SHIPPED
Start: 2019-07-23 | End: 2020-10-15

## 2019-07-23 RX ORDER — SIMVASTATIN 20 MG
TABLET ORAL
Qty: 90 TABLET | Refills: 4 | Status: CANCELLED | OUTPATIENT
Start: 2019-07-23

## 2019-07-23 RX ORDER — LEVOTHYROXINE SODIUM 0.05 MG/1
50 TABLET ORAL
Qty: 90 TABLET | Refills: 4 | Status: SHIPPED | OUTPATIENT
Start: 2019-07-23 | End: 2020-10-20

## 2019-07-23 RX ORDER — CARVEDILOL 6.25 MG/1
6.25 TABLET ORAL 2 TIMES DAILY WITH MEALS
Qty: 180 TABLET | Refills: 4 | Status: SHIPPED | OUTPATIENT
Start: 2019-07-23 | End: 2020-10-05

## 2019-07-23 NOTE — PROGRESS NOTES
Lara Guerrero is a 78year old female who presents for a Medicare Annual Wellness visit, nasal crusting and  chronic conditions f/u.     HPI:    Patient Care Team: Patient Care Team:  Familia Van MD as PCP - General (Family Practice)  [facility] TABLET(20 MG) BY MOUTH EVERY NIGHT Disp: 90 tablet Rfl: 4   Calcium Carbonate-Vitamin D 600-125 MG-UNIT Oral Tab Take  by mouth. Disp:  Rfl:    Coenzyme Q10 (COQ10) 100 MG Oral Cap Take  by mouth.  Disp:  Rfl:    Glucosamine-Fish Oil-EPA-DHA (GLUCOSAMINE & you maintain positive mental well-being?: Social Interaction;Puzzles; Visiting Friends; Visiting Family    If you are a male age 38-65 or a female age 47-67, do you take aspirin?: No    Have you had any immunizations at another office such as Influenza, Hepa (mg/dL)   Date Value   04/22/2019 85     LDL CHOLESTROL (mg/dL)   Date Value   01/16/2014 94        EKG - w/ Initial Preventative Physical Exam only, or if medically necessary Up to date    Colorectal Cancer Screening      Colonoscopy Screen every 10 years No significant additional findings. FRAX score not reported, because all of the T score are at or above -1       Impression    CONCLUSION:  No osteopenia or osteoporosis.            The World Health Organization has defined the following categories ba flowsheet data found. Creatinine  Annually CREATININE (mg/dL)   Date Value   04/14/2014 0.57     Creatinine (mg/dL)   Date Value   04/22/2019 0.82    No flowsheet data found.     Digoxin Serum Conc  Annually No results found for: DIGOXIN No flowsheet sidney Rfl:        MEDICAL INFORMATION:   Past Medical History:   Diagnosis Date   • Acute bronchitis    • Acute maxillary sinusitis    • Anxiety state, unspecified    • Breast CA ([facility] 2007   • Cancer [facility])    • Cancer, colon ([facility]    • Exposure to unspecified ra Smoker      Smokeless tobacco: Never Used    Alcohol use: No      Comment: holidays    Drug use: No    Occ: retired  : yes        REVIEW OF SYSTEMS:   GENERAL: feels well otherwise  SKIN: denies any unusual skin lesions  EYES: denies blurred vision well.        Primary osteoarthritis of right knee: falling risk d/w the pt.  - VITAMIN D, 25-HYDROXY; Future  Home exercises. Benign hypertension,Pure hypercholesterolemia  - LIPID PANEL; Future  - CMP; Future  Cont. Present meds. ASA 81mg a day.    l

## 2019-07-25 ENCOUNTER — TELEPHONE (OUTPATIENT)
Dept: FAMILY MEDICINE CLINIC | Facility: CLINIC | Age: 79
End: 2019-07-25

## 2019-07-25 RX ORDER — MUPIROCIN CALCIUM 20 MG/G
1 CREAM TOPICAL DAILY
Qty: 30 G | Refills: 1 | Status: SHIPPED | OUTPATIENT
Start: 2019-07-25 | End: 2019-08-08

## 2019-07-25 RX ORDER — CHOLECALCIFEROL (VITAMIN D3) 50 MCG
1 TABLET ORAL DAILY
COMMUNITY
Start: 2019-07-25 | End: 2020-10-05

## 2019-07-25 NOTE — TELEPHONE ENCOUNTER
----- Message from Kirsten Roper MD sent at 7/25/2019  8:23 AM CDT -----  Vit. D 2000U a day, MSSA, Mupirocine 1% in the nostrils, BID for 5 days.

## 2019-07-25 NOTE — TELEPHONE ENCOUNTER
Discussed lab results and med instructions with patient. Verbalized understanding of all instructions. Dr.P- MEHTA'm not finding mupirocin 1%. OK to send 2%? Med pended, please review. Thanks.

## 2019-08-07 ENCOUNTER — HOSPITAL ENCOUNTER (OUTPATIENT)
Dept: MAMMOGRAPHY | Age: 79
Discharge: HOME | End: 2019-08-07
Attending: FAMILY MEDICINE
Payer: MEDICARE

## 2019-08-07 DIAGNOSIS — Z12.31 ENCOUNTER FOR SCREENING MAMMOGRAM FOR BREAST CANCER: ICD-10-CM

## 2019-08-07 PROCEDURE — 77067 SCR MAMMO BI INCL CAD: CPT | Performed by: FAMILY MEDICINE

## 2019-08-07 PROCEDURE — 77063 BREAST TOMOSYNTHESIS BI: CPT | Performed by: FAMILY MEDICINE

## 2019-09-04 ENCOUNTER — TELEPHONE (OUTPATIENT)
Dept: CARDIOLOGY | Age: 79
End: 2019-09-04

## 2019-09-04 RX ORDER — LOSARTAN POTASSIUM AND HYDROCHLOROTHIAZIDE 12.5; 5 MG/1; MG/1
1 TABLET ORAL DAILY
Qty: 90 TABLET | Refills: 3 | Status: CANCELLED | OUTPATIENT
Start: 2019-09-04

## 2019-09-04 RX ORDER — LOSARTAN POTASSIUM 50 MG/1
50 TABLET ORAL DAILY
Qty: 30 TABLET | Refills: 2 | Status: SHIPPED | OUTPATIENT
Start: 2019-09-04 | End: 2019-11-04 | Stop reason: ALTCHOICE

## 2019-09-04 RX ORDER — HYDROCHLOROTHIAZIDE 12.5 MG/1
12.5 TABLET ORAL DAILY
Qty: 30 TABLET | Refills: 2 | Status: SHIPPED | OUTPATIENT
Start: 2019-09-04 | End: 2020-04-13

## 2019-11-04 ENCOUNTER — OFFICE VISIT (OUTPATIENT)
Dept: CARDIOLOGY | Age: 79
End: 2019-11-04

## 2019-11-04 VITALS
HEART RATE: 60 BPM | HEIGHT: 62 IN | BODY MASS INDEX: 30.55 KG/M2 | DIASTOLIC BLOOD PRESSURE: 66 MMHG | SYSTOLIC BLOOD PRESSURE: 130 MMHG | WEIGHT: 166 LBS

## 2019-11-04 DIAGNOSIS — R60.1 GENERALIZED EDEMA: ICD-10-CM

## 2019-11-04 DIAGNOSIS — I65.23 ASYMPTOMATIC CAROTID ARTERY STENOSIS, BILATERAL: ICD-10-CM

## 2019-11-04 DIAGNOSIS — I10 HYPERTENSION, BENIGN: ICD-10-CM

## 2019-11-04 DIAGNOSIS — E78.00 PURE HYPERCHOLESTEROLEMIA: ICD-10-CM

## 2019-11-04 DIAGNOSIS — I35.1 NONRHEUMATIC AORTIC (VALVE) INSUFFICIENCY: Primary | ICD-10-CM

## 2019-11-04 DIAGNOSIS — Z82.49 FAMILY HISTORY OF CORONARY ARTERIOSCLEROSIS: ICD-10-CM

## 2019-11-04 PROCEDURE — 99214 OFFICE O/P EST MOD 30 MIN: CPT | Performed by: INTERNAL MEDICINE

## 2019-11-04 ASSESSMENT — PATIENT HEALTH QUESTIONNAIRE - PHQ9
SUM OF ALL RESPONSES TO PHQ9 QUESTIONS 1 AND 2: 0
1. LITTLE INTEREST OR PLEASURE IN DOING THINGS: NOT AT ALL
SUM OF ALL RESPONSES TO PHQ9 QUESTIONS 1 AND 2: 0
2. FEELING DOWN, DEPRESSED OR HOPELESS: NOT AT ALL

## 2020-01-30 RX ORDER — CARVEDILOL 3.12 MG/1
TABLET ORAL
Qty: 180 TABLET | Refills: 3 | Status: SHIPPED | OUTPATIENT
Start: 2020-01-30 | End: 2020-10-14 | Stop reason: DRUGHIGH

## 2020-02-13 ENCOUNTER — HOSPITAL ENCOUNTER (OUTPATIENT)
Dept: CARDIOLOGY CLINIC | Facility: HOSPITAL | Age: 80
Discharge: HOME | End: 2020-02-13
Attending: INTERNAL MEDICINE
Payer: MEDICARE

## 2020-02-13 DIAGNOSIS — I65.23 ASYMPTOMATIC CAROTID ARTERY STENOSIS, BILATERAL: ICD-10-CM

## 2020-02-13 PROCEDURE — 93880 EXTRACRANIAL BILAT STUDY: CPT | Performed by: INTERNAL MEDICINE

## 2020-02-14 ENCOUNTER — DOCUMENTATION (OUTPATIENT)
Dept: CARDIOLOGY | Age: 80
End: 2020-02-14

## 2020-02-17 DIAGNOSIS — I65.23 ASYMPTOMATIC CAROTID ARTERY STENOSIS, BILATERAL: ICD-10-CM

## 2020-02-20 ENCOUNTER — TELEPHONE (OUTPATIENT)
Dept: CARDIOLOGY | Age: 80
End: 2020-02-20

## 2020-04-13 RX ORDER — HYDROCHLOROTHIAZIDE 12.5 MG/1
TABLET ORAL
Qty: 90 TABLET | Refills: 0 | Status: SHIPPED | OUTPATIENT
Start: 2020-04-13 | End: 2020-07-13

## 2020-04-13 RX ORDER — LOSARTAN POTASSIUM 50 MG/1
TABLET ORAL
Qty: 90 TABLET | Refills: 0 | Status: SHIPPED | OUTPATIENT
Start: 2020-04-13 | End: 2020-07-13

## 2020-05-06 ENCOUNTER — TELEPHONE (OUTPATIENT)
Dept: CARDIOLOGY | Age: 80
End: 2020-05-06

## 2020-06-24 ENCOUNTER — LABORATORY ENCOUNTER (OUTPATIENT)
Dept: LAB | Age: 80
End: 2020-06-24
Attending: INTERNAL MEDICINE
Payer: MEDICARE

## 2020-06-24 DIAGNOSIS — I10 ESSENTIAL HYPERTENSION, MALIGNANT: Primary | ICD-10-CM

## 2020-06-24 PROCEDURE — 80061 LIPID PANEL: CPT

## 2020-06-24 PROCEDURE — 80053 COMPREHEN METABOLIC PANEL: CPT

## 2020-06-24 PROCEDURE — 36415 COLL VENOUS BLD VENIPUNCTURE: CPT

## 2020-06-30 ENCOUNTER — TELEPHONE (OUTPATIENT)
Dept: CARDIOLOGY | Age: 80
End: 2020-06-30

## 2020-07-01 ENCOUNTER — PATIENT OUTREACH (OUTPATIENT)
Dept: FAMILY MEDICINE CLINIC | Facility: CLINIC | Age: 80
End: 2020-07-01

## 2020-07-13 RX ORDER — HYDROCHLOROTHIAZIDE 12.5 MG/1
TABLET ORAL
Qty: 90 TABLET | Refills: 0 | Status: SHIPPED | OUTPATIENT
Start: 2020-07-13 | End: 2020-10-09

## 2020-07-13 RX ORDER — LOSARTAN POTASSIUM 50 MG/1
TABLET ORAL
Qty: 90 TABLET | Refills: 0 | Status: SHIPPED | OUTPATIENT
Start: 2020-07-13 | End: 2020-10-09

## 2020-08-03 ENCOUNTER — OFFICE VISIT (OUTPATIENT)
Dept: CARDIOLOGY | Age: 80
End: 2020-08-03

## 2020-08-03 VITALS
DIASTOLIC BLOOD PRESSURE: 70 MMHG | WEIGHT: 162 LBS | SYSTOLIC BLOOD PRESSURE: 138 MMHG | HEART RATE: 62 BPM | BODY MASS INDEX: 29.63 KG/M2

## 2020-08-03 DIAGNOSIS — I65.23 ASYMPTOMATIC CAROTID ARTERY STENOSIS, BILATERAL: ICD-10-CM

## 2020-08-03 DIAGNOSIS — R60.1 GENERALIZED EDEMA: ICD-10-CM

## 2020-08-03 DIAGNOSIS — E78.00 PURE HYPERCHOLESTEROLEMIA: ICD-10-CM

## 2020-08-03 DIAGNOSIS — I35.1 NONRHEUMATIC AORTIC (VALVE) INSUFFICIENCY: ICD-10-CM

## 2020-08-03 DIAGNOSIS — I10 HYPERTENSION, BENIGN: Primary | ICD-10-CM

## 2020-08-03 PROCEDURE — 99214 OFFICE O/P EST MOD 30 MIN: CPT | Performed by: INTERNAL MEDICINE

## 2020-08-20 ENCOUNTER — HOSPITAL ENCOUNTER (OUTPATIENT)
Dept: GENERAL RADIOLOGY | Age: 80
Discharge: HOME | End: 2020-08-20
Attending: EMERGENCY MEDICINE

## 2020-08-20 ENCOUNTER — WALK IN (OUTPATIENT)
Dept: URGENT CARE | Age: 80
End: 2020-08-20
Attending: EMERGENCY MEDICINE

## 2020-08-20 DIAGNOSIS — R52 PAIN: ICD-10-CM

## 2020-08-20 DIAGNOSIS — M16.11 OSTEOARTHRITIS OF RIGHT HIP, UNSPECIFIED OSTEOARTHRITIS TYPE: ICD-10-CM

## 2020-08-20 DIAGNOSIS — M25.551 RIGHT HIP PAIN: Primary | ICD-10-CM

## 2020-08-20 PROCEDURE — 99202 OFFICE O/P NEW SF 15 MIN: CPT

## 2020-08-20 PROCEDURE — 73502 X-RAY EXAM HIP UNI 2-3 VIEWS: CPT

## 2020-08-20 ASSESSMENT — ENCOUNTER SYMPTOMS
BACK PAIN: 0
ADENOPATHY: 0
CONFUSION: 0
FEVER: 0
ABDOMINAL PAIN: 0
EYE REDNESS: 0
COUGH: 0
DIZZINESS: 0

## 2020-08-20 ASSESSMENT — PAIN SCALES - GENERAL: PAINLEVEL: 7-8

## 2020-09-16 ENCOUNTER — TELEPHONE (OUTPATIENT)
Dept: FAMILY MEDICINE CLINIC | Facility: CLINIC | Age: 80
End: 2020-09-16

## 2020-09-16 NOTE — TELEPHONE ENCOUNTER
Pt's dtr states pt is do for AWV, she is asking if this should be done in office, or if it can be done as a virtual appt? LOV 7/23/19.

## 2020-09-27 ENCOUNTER — HOSPITAL ENCOUNTER (EMERGENCY)
Facility: HOSPITAL | Age: 80
Discharge: HOME | End: 2020-09-27
Attending: EMERGENCY MEDICINE
Payer: MEDICARE

## 2020-09-27 VITALS
RESPIRATION RATE: 16 BRPM | WEIGHT: 161 LBS | OXYGEN SATURATION: 95 % | HEIGHT: 62 IN | TEMPERATURE: 98 F | SYSTOLIC BLOOD PRESSURE: 156 MMHG | HEART RATE: 61 BPM | BODY MASS INDEX: 29.63 KG/M2 | DIASTOLIC BLOOD PRESSURE: 72 MMHG

## 2020-09-27 DIAGNOSIS — R03.0 ELEVATED BLOOD PRESSURE READING: Primary | ICD-10-CM

## 2020-09-27 LAB
ALBUMIN SERPL-MCNC: 3.8 G/DL (ref 3.4–5)
ALBUMIN/GLOB SERPL: 1.1 {RATIO} (ref 1–2)
ALP LIVER SERPL-CCNC: 80 U/L
ALT SERPL-CCNC: 23 U/L
ANION GAP SERPL CALC-SCNC: 3 MMOL/L (ref 0–18)
AST SERPL-CCNC: 16 U/L (ref 15–37)
BASOPHILS # BLD AUTO: 0.04 X10(3) UL (ref 0–0.2)
BASOPHILS NFR BLD AUTO: 0.8 %
BILIRUB SERPL-MCNC: 0.8 MG/DL (ref 0.1–2)
BILIRUB UR QL STRIP.AUTO: NEGATIVE
BUN BLD-MCNC: 14 MG/DL (ref 7–18)
BUN/CREAT SERPL: 16.1 (ref 10–20)
CALCIUM BLD-MCNC: 9.3 MG/DL (ref 8.5–10.1)
CHLORIDE SERPL-SCNC: 106 MMOL/L (ref 98–112)
CLARITY UR REFRACT.AUTO: CLEAR
CO2 SERPL-SCNC: 30 MMOL/L (ref 21–32)
CREAT BLD-MCNC: 0.87 MG/DL
DEPRECATED RDW RBC AUTO: 42.8 FL (ref 35.1–46.3)
EOSINOPHIL # BLD AUTO: 0.21 X10(3) UL (ref 0–0.7)
EOSINOPHIL NFR BLD AUTO: 4.3 %
ERYTHROCYTE [DISTWIDTH] IN BLOOD BY AUTOMATED COUNT: 12.7 % (ref 11–15)
GLOBULIN PLAS-MCNC: 3.6 G/DL (ref 2.8–4.4)
GLUCOSE BLD-MCNC: 103 MG/DL (ref 70–99)
GLUCOSE UR STRIP.AUTO-MCNC: NEGATIVE MG/DL
HCT VFR BLD AUTO: 39.3 %
HGB BLD-MCNC: 12.5 G/DL
IMM GRANULOCYTES # BLD AUTO: 0.01 X10(3) UL (ref 0–1)
IMM GRANULOCYTES NFR BLD: 0.2 %
KETONES UR STRIP.AUTO-MCNC: NEGATIVE MG/DL
LYMPHOCYTES # BLD AUTO: 1.58 X10(3) UL (ref 1–4)
LYMPHOCYTES NFR BLD AUTO: 32.4 %
M PROTEIN MFR SERPL ELPH: 7.4 G/DL (ref 6.4–8.2)
MCH RBC QN AUTO: 29.5 PG (ref 26–34)
MCHC RBC AUTO-ENTMCNC: 31.8 G/DL (ref 31–37)
MCV RBC AUTO: 92.7 FL
MONOCYTES # BLD AUTO: 0.7 X10(3) UL (ref 0.1–1)
MONOCYTES NFR BLD AUTO: 14.3 %
NEUTROPHILS # BLD AUTO: 2.34 X10 (3) UL (ref 1.5–7.7)
NEUTROPHILS # BLD AUTO: 2.34 X10(3) UL (ref 1.5–7.7)
NEUTROPHILS NFR BLD AUTO: 48 %
NITRITE UR QL STRIP.AUTO: NEGATIVE
OSMOLALITY SERPL CALC.SUM OF ELEC: 289 MOSM/KG (ref 275–295)
PH UR STRIP.AUTO: 6 [PH] (ref 4.5–8)
PLATELET # BLD AUTO: 208 10(3)UL (ref 150–450)
POTASSIUM SERPL-SCNC: 3.5 MMOL/L (ref 3.5–5.1)
PROT UR STRIP.AUTO-MCNC: NEGATIVE MG/DL
RBC # BLD AUTO: 4.24 X10(6)UL
RBC UR QL AUTO: NEGATIVE
SODIUM SERPL-SCNC: 139 MMOL/L (ref 136–145)
SP GR UR STRIP.AUTO: <1.005 (ref 1–1.03)
UROBILINOGEN UR STRIP.AUTO-MCNC: <2 MG/DL
WBC # BLD AUTO: 4.9 X10(3) UL (ref 4–11)

## 2020-09-27 PROCEDURE — 81001 URINALYSIS AUTO W/SCOPE: CPT | Performed by: EMERGENCY MEDICINE

## 2020-09-27 PROCEDURE — 99284 EMERGENCY DEPT VISIT MOD MDM: CPT

## 2020-09-27 PROCEDURE — 85025 COMPLETE CBC W/AUTO DIFF WBC: CPT | Performed by: EMERGENCY MEDICINE

## 2020-09-27 PROCEDURE — 93005 ELECTROCARDIOGRAM TRACING: CPT

## 2020-09-27 PROCEDURE — 36415 COLL VENOUS BLD VENIPUNCTURE: CPT

## 2020-09-27 PROCEDURE — 80053 COMPREHEN METABOLIC PANEL: CPT | Performed by: EMERGENCY MEDICINE

## 2020-09-27 PROCEDURE — 93010 ELECTROCARDIOGRAM REPORT: CPT

## 2020-09-27 RX ORDER — CLONIDINE HYDROCHLORIDE 0.1 MG/1
0.1 TABLET ORAL ONCE
Status: COMPLETED | OUTPATIENT
Start: 2020-09-27 | End: 2020-09-27

## 2020-09-28 ENCOUNTER — LAB ENCOUNTER (OUTPATIENT)
Dept: LAB | Facility: HOSPITAL | Age: 80
End: 2020-09-28
Attending: NURSE PRACTITIONER
Payer: MEDICARE

## 2020-09-28 ENCOUNTER — OFFICE VISIT (OUTPATIENT)
Dept: CARDIOLOGY | Age: 80
End: 2020-09-28

## 2020-09-28 VITALS — HEART RATE: 62 BPM | SYSTOLIC BLOOD PRESSURE: 110 MMHG | DIASTOLIC BLOOD PRESSURE: 64 MMHG

## 2020-09-28 DIAGNOSIS — I10 ESSENTIAL HYPERTENSION, BENIGN: Primary | ICD-10-CM

## 2020-09-28 DIAGNOSIS — F41.9 ANXIETY: ICD-10-CM

## 2020-09-28 DIAGNOSIS — I10 HYPERTENSION, BENIGN: Primary | ICD-10-CM

## 2020-09-28 LAB
ATRIAL RATE: 60 BPM
P AXIS: 44 DEGREES
P-R INTERVAL: 218 MS
Q-T INTERVAL: 446 MS
QRS DURATION: 100 MS
QTC CALCULATION (BEZET): 446 MS
R AXIS: -3 DEGREES
T AXIS: 23 DEGREES
VENTRICULAR RATE: 60 BPM

## 2020-09-28 PROCEDURE — 36415 COLL VENOUS BLD VENIPUNCTURE: CPT

## 2020-09-28 PROCEDURE — 84443 ASSAY THYROID STIM HORMONE: CPT

## 2020-09-28 PROCEDURE — 99214 OFFICE O/P EST MOD 30 MIN: CPT | Performed by: NURSE PRACTITIONER

## 2020-09-28 ASSESSMENT — ENCOUNTER SYMPTOMS
DIAPHORESIS: 0
DECREASED APPETITE: 0
GASTROINTESTINAL NEGATIVE: 1
NEUROLOGICAL NEGATIVE: 1
WEIGHT GAIN: 0
SYNCOPE: 0
WEIGHT LOSS: 0
SHORTNESS OF BREATH: 0

## 2020-09-28 ASSESSMENT — PATIENT HEALTH QUESTIONNAIRE - PHQ9
CLINICAL INTERPRETATION OF PHQ2 SCORE: NO FURTHER SCREENING NEEDED
SUM OF ALL RESPONSES TO PHQ9 QUESTIONS 1 AND 2: 0
SUM OF ALL RESPONSES TO PHQ9 QUESTIONS 1 AND 2: 0
2. FEELING DOWN, DEPRESSED OR HOPELESS: NOT AT ALL
1. LITTLE INTEREST OR PLEASURE IN DOING THINGS: NOT AT ALL
CLINICAL INTERPRETATION OF PHQ9 SCORE: NO FURTHER SCREENING NEEDED

## 2020-09-28 NOTE — ED INITIAL ASSESSMENT (HPI)
[de-identified] yo F hx of HTN presents for episodes of intermittent HTN this past week. Systolic averaging at 265-699. Today pt was cooking steak and she felt her thinking was cloudy and feels off.  Pt thinks she was having anxiety so she took half tab xanax at 6pm tod

## 2020-09-28 NOTE — ED PROVIDER NOTES
Patient Seen in: [facility] Emergency Department      History   Patient presents with:  Hypertension    Stated Complaint: HTN, headache    HPI    78-year-old female presents reporting elevated blood pressure.   She reports multiple readings in the 16 Invasive and DCIS   • MYRA BIOPSY STEREO NODULE 1 SITE RIGHT (CPT=19081)  2007    DCIS   • NM PARATHYROID SURGERY  (CPT=78070)  2008   • OTHER  1/1/06    lithotripsy   • OTHER      parathyroid resection   • RADIATION RIGHT     • 69 EMILEE Naidu Moderate (*)     All other components within normal limits   CBC WITH DIFFERENTIAL WITH PLATELET    Narrative: The following orders were created for panel order CBC WITH DIFFERENTIAL WITH PLATELET.   Procedure                               Abnormality [address] [facility] [address]  [phone]    Schedule an appointment as soon as possible for a visit  cardiology          Medications Prescribed:  Current Discharge Medication List

## 2020-10-05 ENCOUNTER — HOSPITAL ENCOUNTER (OUTPATIENT)
Facility: HOSPITAL | Age: 80
Setting detail: OBSERVATION
Discharge: HOME | End: 2020-10-06
Attending: EMERGENCY MEDICINE | Admitting: HOSPITALIST
Payer: MEDICARE

## 2020-10-05 ENCOUNTER — APPOINTMENT (OUTPATIENT)
Dept: GENERAL RADIOLOGY | Facility: HOSPITAL | Age: 80
End: 2020-10-05
Attending: EMERGENCY MEDICINE
Payer: MEDICARE

## 2020-10-05 ENCOUNTER — APPOINTMENT (OUTPATIENT)
Dept: CT IMAGING | Facility: HOSPITAL | Age: 80
End: 2020-10-05
Attending: HOSPITALIST
Payer: MEDICARE

## 2020-10-05 ENCOUNTER — APPOINTMENT (OUTPATIENT)
Dept: CT IMAGING | Facility: HOSPITAL | Age: 80
End: 2020-10-05
Attending: EMERGENCY MEDICINE
Payer: MEDICARE

## 2020-10-05 DIAGNOSIS — R42 DIZZINESS: Primary | ICD-10-CM

## 2020-10-05 DIAGNOSIS — R77.8 ELEVATED TROPONIN: ICD-10-CM

## 2020-10-05 PROBLEM — R79.89 ELEVATED TROPONIN: Status: ACTIVE | Noted: 2020-10-05

## 2020-10-05 PROCEDURE — 99214 OFFICE O/P EST MOD 30 MIN: CPT | Performed by: INTERNAL MEDICINE

## 2020-10-05 PROCEDURE — 99220 INITIAL OBSERVATION CARE,LEVL III: CPT | Performed by: HOSPITALIST

## 2020-10-05 PROCEDURE — 71275 CT ANGIOGRAPHY CHEST: CPT | Performed by: HOSPITALIST

## 2020-10-05 PROCEDURE — 70450 CT HEAD/BRAIN W/O DYE: CPT | Performed by: EMERGENCY MEDICINE

## 2020-10-05 PROCEDURE — 71045 X-RAY EXAM CHEST 1 VIEW: CPT | Performed by: EMERGENCY MEDICINE

## 2020-10-05 RX ORDER — POLYETHYLENE GLYCOL 3350 17 G/17G
17 POWDER, FOR SOLUTION ORAL DAILY PRN
Status: DISCONTINUED | OUTPATIENT
Start: 2020-10-05 | End: 2020-10-05

## 2020-10-05 RX ORDER — MAGNESIUM HYDROXIDE/ALUMINUM HYDROXICE/SIMETHICONE 120; 1200; 1200 MG/30ML; MG/30ML; MG/30ML
30 SUSPENSION ORAL DAILY PRN
Status: DISCONTINUED | OUTPATIENT
Start: 2020-10-05 | End: 2020-10-06

## 2020-10-05 RX ORDER — CARVEDILOL 3.12 MG/1
3.12 TABLET ORAL 2 TIMES DAILY WITH MEALS
Status: ON HOLD | COMMUNITY
End: 2020-10-06

## 2020-10-05 RX ORDER — ASPIRIN 81 MG/1
324 TABLET, CHEWABLE ORAL ONCE
Status: COMPLETED | OUTPATIENT
Start: 2020-10-05 | End: 2020-10-05

## 2020-10-05 RX ORDER — SIMETHICONE 80 MG
80 TABLET,CHEWABLE ORAL 4 TIMES DAILY PRN
Status: DISCONTINUED | OUTPATIENT
Start: 2020-10-05 | End: 2020-10-06

## 2020-10-05 RX ORDER — ONDANSETRON 2 MG/ML
8 INJECTION INTRAMUSCULAR; INTRAVENOUS EVERY 6 HOURS PRN
Status: DISCONTINUED | OUTPATIENT
Start: 2020-10-05 | End: 2020-10-06

## 2020-10-05 RX ORDER — CARVEDILOL 3.12 MG/1
3.12 TABLET ORAL 2 TIMES DAILY WITH MEALS
Status: DISCONTINUED | OUTPATIENT
Start: 2020-10-05 | End: 2020-10-06

## 2020-10-05 RX ORDER — HYDROCHLOROTHIAZIDE 12.5 MG/1
12.5 CAPSULE, GELATIN COATED ORAL DAILY
Status: DISCONTINUED | OUTPATIENT
Start: 2020-10-05 | End: 2020-10-06

## 2020-10-05 RX ORDER — LEVOTHYROXINE SODIUM 0.05 MG/1
50 TABLET ORAL
Status: DISCONTINUED | OUTPATIENT
Start: 2020-10-06 | End: 2020-10-06

## 2020-10-05 RX ORDER — POLYETHYLENE GLYCOL 3350 17 G/17G
17 POWDER, FOR SOLUTION ORAL DAILY PRN
Status: DISCONTINUED | OUTPATIENT
Start: 2020-10-05 | End: 2020-10-06

## 2020-10-05 RX ORDER — LOSARTAN POTASSIUM 25 MG/1
50 TABLET ORAL DAILY
Status: DISCONTINUED | OUTPATIENT
Start: 2020-10-05 | End: 2020-10-06

## 2020-10-05 RX ORDER — SIMVASTATIN 40 MG
60 TABLET ORAL NIGHTLY
COMMUNITY

## 2020-10-05 RX ORDER — CALCIUM CARBONATE 200(500)MG
500 TABLET,CHEWABLE ORAL
Status: DISCONTINUED | OUTPATIENT
Start: 2020-10-05 | End: 2020-10-06

## 2020-10-05 RX ORDER — LOSARTAN POTASSIUM 50 MG/1
50 TABLET ORAL DAILY
Status: ON HOLD | COMMUNITY
End: 2021-02-12

## 2020-10-05 RX ORDER — ACETAMINOPHEN 325 MG/1
650 TABLET ORAL EVERY 6 HOURS PRN
Status: DISCONTINUED | OUTPATIENT
Start: 2020-10-05 | End: 2020-10-06

## 2020-10-05 RX ORDER — ATORVASTATIN CALCIUM 20 MG/1
20 TABLET, FILM COATED ORAL NIGHTLY
Status: DISCONTINUED | OUTPATIENT
Start: 2020-10-05 | End: 2020-10-06

## 2020-10-05 RX ORDER — BENZONATATE 100 MG/1
100 CAPSULE ORAL 3 TIMES DAILY PRN
Status: DISCONTINUED | OUTPATIENT
Start: 2020-10-05 | End: 2020-10-06

## 2020-10-05 RX ORDER — SIMVASTATIN 20 MG
60 TABLET ORAL NIGHTLY
COMMUNITY

## 2020-10-05 RX ORDER — BISACODYL 10 MG
10 SUPPOSITORY, RECTAL RECTAL
Status: DISCONTINUED | OUTPATIENT
Start: 2020-10-05 | End: 2020-10-05

## 2020-10-05 RX ORDER — ASPIRIN 81 MG/1
324 TABLET, CHEWABLE ORAL DAILY
Status: DISCONTINUED | OUTPATIENT
Start: 2020-10-06 | End: 2020-10-06

## 2020-10-05 RX ORDER — BISACODYL 10 MG
10 SUPPOSITORY, RECTAL RECTAL
Status: DISCONTINUED | OUTPATIENT
Start: 2020-10-05 | End: 2020-10-06

## 2020-10-05 RX ORDER — ALPRAZOLAM 0.5 MG/1
0.25 TABLET ORAL 2 TIMES DAILY PRN
Status: DISCONTINUED | OUTPATIENT
Start: 2020-10-05 | End: 2020-10-06

## 2020-10-05 RX ORDER — ENOXAPARIN SODIUM 100 MG/ML
40 INJECTION SUBCUTANEOUS DAILY
Status: DISCONTINUED | OUTPATIENT
Start: 2020-10-05 | End: 2020-10-06

## 2020-10-05 RX ORDER — HYDROCHLOROTHIAZIDE 12.5 MG/1
12.5 TABLET ORAL DAILY
Status: ON HOLD | COMMUNITY
End: 2021-02-12

## 2020-10-05 RX ORDER — MELATONIN
1000 DAILY
COMMUNITY

## 2020-10-05 NOTE — CONSULTS
Cardiology Consult Note     PRIMARY CARDIOLOGIST: [facility]      CONSULT FOR: 79 Y/O FEMALE WITH ADMIT WITH NEAR SYNCOPE AND LEFT SHOULDER PAIN FOR LESS THAN 5 MIN. HISTORY: [de-identified] Y/O FEMALE WITH HX OF HTN, HYPERCHOL, FAM HX OF CAD AND REMOTE SMOKER.  PRESENT

## 2020-10-05 NOTE — ED NOTES
Report called to [address] RN at this time. Awaiting nurse who was called in for assignment to send pt to floor.

## 2020-10-05 NOTE — ED PROVIDER NOTES
Patient Seen in: [facility] Emergency Department      History   Patient presents with:  Fatigue  Dizziness  Headache    Stated Complaint: sudden onset weakness, dizziness, head pressure    HPI    79-year-old female with history of hypertension, hypo • Unspecified disorder of thyroid    • Unspecified essential hypertension    • Valvular disease               Past Surgical History:   Procedure Laterality Date   • ANTERIOR POSTERIOR REPAIR N/A 3/5/2015    Performed by Keyonna Longoria MD at [address] [place] [address] muscles are intact, there is no scleral icterus. Mucous membranes are moist, oropharynx is clear, uvula midline. Scalp is atraumatic. NECK: Neck is supple, there is no nuchal rigidity. No carotid bruits. No masses. Trachea midline.   No cervical lymp BLUE   RAINBOW DRAW LAVENDER   RAINBOW DRAW LIGHT GREEN   RAINBOW DRAW GOLD   CBC W/ DIFFERENTIAL     EKG    Rate, intervals and axes as noted on EKG Report. Rate: 65  Rhythm: Sinus Rhythm  Reading: Sinus rhythm with surgically AV block.   No change compar

## 2020-10-05 NOTE — PLAN OF CARE
NURSING ADMISSION NOTE      Patient admitted via cart  Oriented to room. Safety precautions initiated. Bed in low position. Call light in reach. Pt admitted from ED came in c/o having had  Some dizziness with headache in the morning.  Pt also stat

## 2020-10-05 NOTE — ED INITIAL ASSESSMENT (HPI)
Pt to ED from home with daughter with complaints of increased weakness and dizziness since this morning. Pt denies any falls or syncopal episodes, just feels not like herself.  Also reports headache which is \"pressure like\" since this AM that occurred aft

## 2020-10-05 NOTE — PLAN OF CARE
[facility] spoke with Dr Demar Palma, and Dr. Jalyn Jaeger patient is a low suspicion for covid and rapid covid test is negative.  May place on non covid unit

## 2020-10-05 NOTE — H&P
[facility]  History and Physical     Danny De Dios Patient Status:  Emergency    1940 MRN [id number]   Location [address] Attending Ana Andersen, [facility] Day # 0 PCP Yolanda Robertson MD     Chief Complaint: [facility] (CPT=78070)  2008   • OTHER  1/1/06    lithotripsy   • OTHER      parathyroid resection   • RADIATION RIGHT     • VAGINIAL HYSTERECTOMY, VAGINAL VAULT SUSPENSION, AP REPAIR, TVT, CYSTO N/A 4/25/2014    Performed by Lupe Linton MD at [place] (GLUCOSAMINE & FISH OIL OR), Take  by mouth., Disp: , Rfl:     •  Cholecalciferol (VITAMIN D) 2000 UNITS Oral Cap, Take 1 tablet by mouth daily. 4 tabs daily, Disp: , Rfl:     •  vitamin E 400 UNITS Oral Cap, Take 1,000 Units by mouth daily. , Disp: , Rfl: stress tomorrow; trend top; check d-dimer; [facility] consult; ASA; hold off on heparin drip unless requested by cards; no echo for now per cards. 2. htn  3. HL  4. Hx breast cancer s/p lumpectomy and radiation  5. Hypothyroidism  6.  Elevated d-dimer; need to en

## 2020-10-06 ENCOUNTER — APPOINTMENT (OUTPATIENT)
Dept: CV DIAGNOSTICS | Facility: HOSPITAL | Age: 80
End: 2020-10-06
Attending: NURSE PRACTITIONER
Payer: MEDICARE

## 2020-10-06 VITALS
HEART RATE: 70 BPM | BODY MASS INDEX: 29.26 KG/M2 | DIASTOLIC BLOOD PRESSURE: 82 MMHG | OXYGEN SATURATION: 94 % | RESPIRATION RATE: 18 BRPM | SYSTOLIC BLOOD PRESSURE: 152 MMHG | WEIGHT: 165.13 LBS | TEMPERATURE: 98 F | HEIGHT: 62.99 IN

## 2020-10-06 PROCEDURE — 93306 TTE W/DOPPLER COMPLETE: CPT | Performed by: NURSE PRACTITIONER

## 2020-10-06 PROCEDURE — 93018 CV STRESS TEST I&R ONLY: CPT | Performed by: NURSE PRACTITIONER

## 2020-10-06 PROCEDURE — 99214 OFFICE O/P EST MOD 30 MIN: CPT | Performed by: INTERNAL MEDICINE

## 2020-10-06 PROCEDURE — 78452 HT MUSCLE IMAGE SPECT MULT: CPT | Performed by: NURSE PRACTITIONER

## 2020-10-06 PROCEDURE — 99217 OBSERVATION CARE DISCHARGE: CPT | Performed by: HOSPITALIST

## 2020-10-06 PROCEDURE — 93017 CV STRESS TEST TRACING ONLY: CPT | Performed by: NURSE PRACTITIONER

## 2020-10-06 RX ORDER — CARVEDILOL 6.25 MG/1
6.25 TABLET ORAL EVERY MORNING
Status: DISCONTINUED | OUTPATIENT
Start: 2020-10-06 | End: 2020-10-06

## 2020-10-06 RX ORDER — CARVEDILOL 6.25 MG/1
6.25 TABLET ORAL EVERY MORNING
Qty: 30 TABLET | Refills: 1 | Status: SHIPPED | OUTPATIENT
Start: 2020-10-06 | End: 2021-02-09

## 2020-10-06 RX ORDER — CARVEDILOL 3.12 MG/1
3.12 TABLET ORAL EVERY EVENING
Qty: 30 TABLET | Refills: 0 | Status: SHIPPED | OUTPATIENT
Start: 2020-10-07 | End: 2020-10-15 | Stop reason: ALTCHOICE

## 2020-10-06 RX ORDER — CARVEDILOL 3.12 MG/1
3.12 TABLET ORAL EVERY EVENING
Status: DISCONTINUED | OUTPATIENT
Start: 2020-10-07 | End: 2020-10-06

## 2020-10-06 RX ORDER — ASPIRIN 81 MG/1
81 TABLET, CHEWABLE ORAL DAILY
Qty: 30 TABLET | Refills: 2 | Status: SHIPPED | OUTPATIENT
Start: 2020-10-06 | End: 2021-02-09

## 2020-10-06 NOTE — PROGRESS NOTES
[facility]  Progress note     Wali Charles Patient Status:  Emergency    1940 MRN [id number]   Location [address] [facility] Attending Richard Hsieh, [facility] Day # 1 PCP Familia Arcos MD     Chief Complaint: dizziness lumpectomy and radiation  5.  Hypothyroidism  D/c today if stress test negative and if cleared by cards  Quality:  · DVT Prophylaxis: lovenox  · CODE status: full  · Zuleta: no  · If COVID testing is negative, may discontinue isolation: yes    Plan of care d

## 2020-10-06 NOTE — PLAN OF CARE
Nuclear stress test (-) for ischemia, EF 60%    Plan to discharge with a 30 day event monitor     ADELE Rivera

## 2020-10-06 NOTE — PLAN OF CARE
ALERT AND ORIENTED X4 ON TELE MONITOR HR 70'S SINUS RHYTHM. DENIES ANY PAIN. UPDATED W/ POC AND VERBALIZED UNDERSTANDING. ALL NEEDS ATTENDED AND WILL CONTINUE TO MONITOR. CALL LIGHT WITHIN REACH.   Problem: CARDIOVASCULAR - ADULT  Goal: Maintains optimal ca

## 2020-10-06 NOTE — PLAN OF CARE
NURSING DISCHARGE NOTE    Discharged home  Via cart   Accompanied by family  Belongings taken with. Pt discharged to home . Discharge instructions including medication, and follow up appointments discussed with pt verbalized understanding.  PIV rem

## 2020-10-06 NOTE — CONSULTS
[facility]    PATIENT'S NAME: Delfina Reyez   ATTENDING PHYSICIAN: Geeta Rbuio D.O.   CONSULTING PHYSICIAN: Anna Ortega M.D.    PATIENT ACCOUNT#:   [de-identified]    LOCATION:  [facility]  MEDICAL RECORD #:   [id number]       DATE OF BIRTH: recent falls, traumas, or injuries. She has not had any recurrent syncope and has not had any angina or cardiac history. Rest of the review of systems negative x10 systems.     PHYSICAL EXAMINATION:    VITAL SIGNS:  Through the emergency room, currently t

## 2020-10-06 NOTE — PLAN OF CARE
Pt alert able to make needs known. Denies any chest pain. Pt scheduled for stress test this afternoon. 2D echo done this morning. Plan of care discussed with pt and daughter, verbalized understanding. Call light within reach.  Will cont to monitor

## 2020-10-06 NOTE — PROGRESS NOTES
CARDIODIAGNOSTIC PRELIMINARY REPORT:  LEXISCAN injection completed with no new EKG changes noted  Second set of images pending

## 2020-10-06 NOTE — DISCHARGE SUMMARY
[facility] [place] [facility]  DISCHARGE SUMMARY     Geoffrey Reynolds Patient Status:  Inpatient    1940 MRN [id number]   [facility] 2NE-A Attending Jose Cintron MD   Hosp Day # 0 PCP Samia Rhodes MD     Date of Admission: 10/5/2020  Date o same name, and this prescription was added. Make sure you understand how and when to take each. Take 1 tablet (6.25 mg total) by mouth every morning.    Quantity: 30 tablet  Refills: 1     carvedilol 3.125 MG Tabs  Commonly known as: COREG  What change prescriptions at the location directed by your doctor or nurse    Bring a paper prescription for each of these medications  · aspirin 81 MG Chew         [place]    Follow-up appointment:   Ayesha Bates, [address] [address] Without the order, your test may be delayed or postponed. It is important to bring a list of any medications you are taking, including vitamins and supplements.  For a helpful medication card to carry to your medical visits, please visit [email or web address]

## 2020-10-06 NOTE — PROGRESS NOTES
[facility]  Cardiology Progress Note    Misty Cameron Patient Status:  Inpatient    1940 MRN [id number]   [facility] 2NE-A Attending Keo Pruitt MD   Hosp Day # 1 PCP Phoebe Gutierres MD     Subjective:  Patient sitting up in Daily   • carvedilol  3.125 mg Oral BID with meals   • hydrochlorothiazide  12.5 mg Oral Daily   • Levothyroxine Sodium  50 mcg Oral QAM AC   • losartan Potassium  50 mg Oral Daily   • atorvastatin  20 mg Oral Nightly   • aspirin  324 mg Oral Daily b/l.     A:  - Near Syncope  - Atypical CP  - HTN emergency  - Mildly elevated troponin - now dowtrending, likely secondary to above  - HTN  - Carotid disease      P:  - Patient denies any further dizziness  - Carotid doppler 2/20 as per outpatient cardiol

## 2020-10-07 ENCOUNTER — TELEPHONE (OUTPATIENT)
Dept: CARDIOLOGY | Age: 80
End: 2020-10-07

## 2020-10-07 ENCOUNTER — PATIENT OUTREACH (OUTPATIENT)
Dept: CASE MANAGEMENT | Age: 80
End: 2020-10-07

## 2020-10-07 DIAGNOSIS — Z02.9 ENCOUNTERS FOR UNSPECIFIED ADMINISTRATIVE PURPOSE: ICD-10-CM

## 2020-10-07 NOTE — PROGRESS NOTES
Attempted to contact the pt for TCM, no answer. Phone rang 4 times and then disconnected. Tried to call again and the same thing happened. NCM to try again at a later time.

## 2020-10-08 ENCOUNTER — HOSPITAL ENCOUNTER (OUTPATIENT)
Dept: CV DIAGNOSTICS | Facility: HOSPITAL | Age: 80
Discharge: HOME | End: 2020-10-08
Attending: INTERNAL MEDICINE
Payer: MEDICARE

## 2020-10-08 DIAGNOSIS — R42 DIZZINESS: ICD-10-CM

## 2020-10-08 DIAGNOSIS — R77.8 ELEVATED TROPONIN: ICD-10-CM

## 2020-10-08 PROCEDURE — 93272 ECG/REVIEW INTERPRET ONLY: CPT | Performed by: INTERNAL MEDICINE

## 2020-10-08 PROCEDURE — 93271 ECG/MONITORING AND ANALYSIS: CPT | Performed by: INTERNAL MEDICINE

## 2020-10-08 PROCEDURE — 93270 REMOTE 30 DAY ECG REV/REPORT: CPT | Performed by: INTERNAL MEDICINE

## 2020-10-09 RX ORDER — HYDROCHLOROTHIAZIDE 12.5 MG/1
TABLET ORAL
Qty: 90 TABLET | Refills: 3 | Status: SHIPPED | OUTPATIENT
Start: 2020-10-09 | End: 2020-11-16 | Stop reason: ALTCHOICE

## 2020-10-09 RX ORDER — LOSARTAN POTASSIUM 50 MG/1
TABLET ORAL
Qty: 90 TABLET | Refills: 3 | Status: SHIPPED | OUTPATIENT
Start: 2020-10-09 | End: 2021-03-11 | Stop reason: DRUGHIGH

## 2020-10-12 ENCOUNTER — TELEPHONE (OUTPATIENT)
Dept: FAMILY MEDICINE CLINIC | Facility: CLINIC | Age: 80
End: 2020-10-12

## 2020-10-12 DIAGNOSIS — R42 DIZZINESS: Primary | ICD-10-CM

## 2020-10-12 NOTE — TELEPHONE ENCOUNTER
[facility] Clinical Staff             PCP: Lorena   Refer to Provider (first and last name): CAIN Crews   Specialty: neurologist   Patient Insurance: No coverage found.  Medicare   Duration/Summary of Symptoms:   Is this a result of an i

## 2020-10-13 RX ORDER — VITAMIN B COMPLEX
1 TABLET ORAL
COMMUNITY

## 2020-10-13 RX ORDER — MELATONIN
1000
COMMUNITY
End: 2021-03-29 | Stop reason: CLARIF

## 2020-10-13 RX ORDER — SIMVASTATIN 20 MG
60 TABLET ORAL DAILY
COMMUNITY
End: 2020-10-22 | Stop reason: SDUPTHER

## 2020-10-14 ENCOUNTER — OFFICE VISIT (OUTPATIENT)
Dept: CARDIOLOGY | Age: 80
End: 2020-10-14

## 2020-10-14 VITALS
BODY MASS INDEX: 30.55 KG/M2 | HEIGHT: 62 IN | DIASTOLIC BLOOD PRESSURE: 72 MMHG | HEART RATE: 64 BPM | SYSTOLIC BLOOD PRESSURE: 136 MMHG | WEIGHT: 166 LBS

## 2020-10-14 DIAGNOSIS — R07.2 PRECORDIAL PAIN: Primary | ICD-10-CM

## 2020-10-14 DIAGNOSIS — I10 HYPERTENSION, BENIGN: ICD-10-CM

## 2020-10-14 DIAGNOSIS — Z82.49 FAMILY HISTORY OF CORONARY ARTERIOSCLEROSIS: ICD-10-CM

## 2020-10-14 DIAGNOSIS — R79.89 ELEVATED TROPONIN: ICD-10-CM

## 2020-10-14 DIAGNOSIS — E78.00 PURE HYPERCHOLESTEROLEMIA: ICD-10-CM

## 2020-10-14 PROCEDURE — 99214 OFFICE O/P EST MOD 30 MIN: CPT | Performed by: NURSE PRACTITIONER

## 2020-10-14 RX ORDER — CARVEDILOL 6.25 MG/1
6.25 TABLET ORAL 2 TIMES DAILY WITH MEALS
Qty: 180 TABLET | Refills: 3 | Status: SHIPPED | OUTPATIENT
Start: 2020-10-14 | End: 2020-11-16 | Stop reason: SDUPTHER

## 2020-10-14 RX ORDER — CARVEDILOL 6.25 MG/1
6.25 TABLET ORAL 2 TIMES DAILY WITH MEALS
COMMUNITY

## 2020-10-14 RX ORDER — METOPROLOL TARTRATE 50 MG/1
50 TABLET, FILM COATED ORAL ONCE
Qty: 1 TABLET | Refills: 0 | Status: SHIPPED | OUTPATIENT
Start: 2020-10-14 | End: 2020-11-16 | Stop reason: ALTCHOICE

## 2020-10-14 ASSESSMENT — ENCOUNTER SYMPTOMS
WEIGHT GAIN: 0
COUGH: 0
NERVOUS/ANXIOUS: 1
BRUISES/BLEEDS EASILY: 0
HEMATOCHEZIA: 0
WEIGHT LOSS: 0
FEVER: 0
SUSPICIOUS LESIONS: 0
DIAPHORESIS: 0
SYNCOPE: 0
CHILLS: 0
ALLERGIC/IMMUNOLOGIC COMMENTS: NO NEW FOOD ALLERGIES
DECREASED APPETITE: 0
NEUROLOGICAL NEGATIVE: 1
GASTROINTESTINAL NEGATIVE: 1
HEMOPTYSIS: 0
SHORTNESS OF BREATH: 0

## 2020-10-14 ASSESSMENT — PATIENT HEALTH QUESTIONNAIRE - PHQ9
2. FEELING DOWN, DEPRESSED OR HOPELESS: NOT AT ALL
CLINICAL INTERPRETATION OF PHQ2 SCORE: NO FURTHER SCREENING NEEDED
1. LITTLE INTEREST OR PLEASURE IN DOING THINGS: NOT AT ALL
CLINICAL INTERPRETATION OF PHQ9 SCORE: NO FURTHER SCREENING NEEDED
SUM OF ALL RESPONSES TO PHQ9 QUESTIONS 1 AND 2: 0
SUM OF ALL RESPONSES TO PHQ9 QUESTIONS 1 AND 2: 0

## 2020-10-15 ENCOUNTER — OFFICE VISIT (OUTPATIENT)
Dept: FAMILY MEDICINE CLINIC | Facility: CLINIC | Age: 80
End: 2020-10-15
Payer: MEDICARE

## 2020-10-15 VITALS
SYSTOLIC BLOOD PRESSURE: 124 MMHG | BODY MASS INDEX: 30.73 KG/M2 | TEMPERATURE: 97 F | HEIGHT: 62 IN | RESPIRATION RATE: 18 BRPM | OXYGEN SATURATION: 96 % | WEIGHT: 167 LBS | DIASTOLIC BLOOD PRESSURE: 78 MMHG | HEART RATE: 63 BPM

## 2020-10-15 DIAGNOSIS — F41.9 ANXIETY: ICD-10-CM

## 2020-10-15 DIAGNOSIS — Z09 HOSPITAL DISCHARGE FOLLOW-UP: ICD-10-CM

## 2020-10-15 DIAGNOSIS — R55 NEAR SYNCOPE: Primary | ICD-10-CM

## 2020-10-15 PROCEDURE — 99495 TRANSJ CARE MGMT MOD F2F 14D: CPT | Performed by: FAMILY MEDICINE

## 2020-10-15 PROCEDURE — 1111F DSCHRG MED/CURRENT MED MERGE: CPT | Performed by: FAMILY MEDICINE

## 2020-10-15 RX ORDER — ALPRAZOLAM 0.25 MG/1
0.25 TABLET ORAL NIGHTLY PRN
Qty: 30 TABLET | Refills: 5 | Status: SHIPPED | OUTPATIENT
Start: 2020-10-15 | End: 2022-01-10

## 2020-10-15 NOTE — PROGRESS NOTES
HPI:    Mehul Lake is a [de-identified]year old female here today for hospital follow up.    She was discharged from Inpatient hospital, [facility] to Home   Admission Date: 10/5/20   Discharge Date: 10/6/20  Hospital Discharge Diagnoses (since 9/15/2020) recommended to increased to 6.25 mg twice daily however patient reluctant and anxious about hypotension. Cardiology plans to obtain a coronary CTA and calcium score to further assess her coronary status. Has cardiac event monitor in place.  Has neurology f unspecified radiation, Fatigue, HIGH BLOOD PRESSURE, HIGH CHOLESTEROL, Hyperparathyroidism ([facility] [address] Hypothyroid, Malignant neoplasm of breast (female), unspecified site, Other and unspecified hyperlipidemia, Pain in joint, lower leg, Unspecified disorder of Neurological: Positive for dizziness. Negative for syncope, weakness, numbness and headaches. Hematological: Negative for adenopathy. Psychiatric/Behavioral: Negative for confusion and agitation.        PHYSICAL EXAM:   Patient's last menstrual period Has been following with cardiology and event monitor in place currently. Has neuro follow up scheduled. No further events since discharge. Also could consider anxiety/panic disorder as she has been under a lot of stress these past 2 years.  Will refer to co

## 2020-10-15 NOTE — PATIENT INSTRUCTIONS
1. Call back if experiencing any consistent or worsening symptoms. 2. Obtain imaging as ordered by cardiology    3. Schedule appointment with neurology   Panic Attack  A panic attack is an extreme fear reaction that comes on for no clear reason.  There much for you to handle. · Notice how your body reacts to stress. Learn to listen to your body signals so that you can take action before the stress becomes severe.   · Try to be aware of what you were doing before the reaction started; this may give you cl breathing  · Increasing feeling of weakness or dizziness  · Cough with dark colored sputum (phlegm) or blood  · Fever of 100.4°F (38°C) or higher, or as directed by your healthcare provider  · Swelling, pain, or redness in one leg  · Requests by family or

## 2020-10-19 ENCOUNTER — ORDER TRANSCRIPTION (OUTPATIENT)
Dept: ADMINISTRATIVE | Facility: HOSPITAL | Age: 80
End: 2020-10-19

## 2020-10-19 DIAGNOSIS — R77.8 ELEVATED TROPONIN: ICD-10-CM

## 2020-10-19 DIAGNOSIS — R07.2 PRECORDIAL PAIN: Primary | ICD-10-CM

## 2020-10-20 DIAGNOSIS — E03.9 ACQUIRED HYPOTHYROIDISM: ICD-10-CM

## 2020-10-20 RX ORDER — LEVOTHYROXINE SODIUM 0.05 MG/1
TABLET ORAL
Qty: 90 TABLET | Refills: 0 | Status: SHIPPED | OUTPATIENT
Start: 2020-10-20 | End: 2021-01-15

## 2020-10-21 ENCOUNTER — E-ADVICE (OUTPATIENT)
Dept: CARDIOLOGY | Age: 80
End: 2020-10-21

## 2020-10-22 RX ORDER — SIMVASTATIN 20 MG
TABLET ORAL
Qty: 90 TABLET | Refills: 3 | Status: SHIPPED | OUTPATIENT
Start: 2020-10-22

## 2020-10-22 RX ORDER — SIMVASTATIN 40 MG
TABLET ORAL
Qty: 90 TABLET | Refills: 3 | Status: SHIPPED | OUTPATIENT
Start: 2020-10-22

## 2020-10-29 ENCOUNTER — HOSPITAL ENCOUNTER (OUTPATIENT)
Dept: CT IMAGING | Facility: HOSPITAL | Age: 80
Discharge: HOME | End: 2020-10-29
Attending: NURSE PRACTITIONER
Payer: MEDICARE

## 2020-10-29 ENCOUNTER — OFFICE VISIT (OUTPATIENT)
Dept: NEUROLOGY | Facility: CLINIC | Age: 80
End: 2020-10-29
Payer: MEDICARE

## 2020-10-29 VITALS — HEART RATE: 58 BPM | SYSTOLIC BLOOD PRESSURE: 117 MMHG | DIASTOLIC BLOOD PRESSURE: 75 MMHG

## 2020-10-29 VITALS
HEIGHT: 62 IN | BODY MASS INDEX: 31 KG/M2 | DIASTOLIC BLOOD PRESSURE: 68 MMHG | SYSTOLIC BLOOD PRESSURE: 130 MMHG | HEART RATE: 78 BPM

## 2020-10-29 DIAGNOSIS — R55 NEAR SYNCOPE: ICD-10-CM

## 2020-10-29 DIAGNOSIS — R07.2 PRECORDIAL PAIN: ICD-10-CM

## 2020-10-29 DIAGNOSIS — R42 DIZZINESS: Primary | ICD-10-CM

## 2020-10-29 DIAGNOSIS — R77.8 ELEVATED TROPONIN: ICD-10-CM

## 2020-10-29 DIAGNOSIS — Z86.59 HISTORY OF PANIC ATTACKS: ICD-10-CM

## 2020-10-29 PROCEDURE — 75574 CT ANGIO HRT W/3D IMAGE: CPT | Performed by: NURSE PRACTITIONER

## 2020-10-29 PROCEDURE — 99204 OFFICE O/P NEW MOD 45 MIN: CPT | Performed by: OTHER

## 2020-10-29 PROCEDURE — 75574 CT ANGIO HRT W/3D IMAGE: CPT | Performed by: INTERNAL MEDICINE

## 2020-10-29 RX ORDER — METOPROLOL TARTRATE 50 MG/1
TABLET, FILM COATED ORAL
COMMUNITY
Start: 2020-10-14 | End: 2020-10-29 | Stop reason: ALTCHOICE

## 2020-10-29 RX ORDER — NITROGLYCERIN 0.4 MG/1
TABLET SUBLINGUAL
Status: COMPLETED
Start: 2020-10-29 | End: 2020-10-29

## 2020-10-29 NOTE — IMAGING NOTE
Received Georgia Tabor to CT Rm 4 GE working with [facility]. Verified name, , allergies. Pt denies use of long acting nitrates like, Imdur, Cialis, Levitra and Viagra. IV access with 20G angiocath to left antecubital area.  O2 applied via nasal ca

## 2020-10-29 NOTE — PROGRESS NOTES
Patient reports that she had an episode where she has a \"pulling sensation\" accompanied by a rapid heart beat. She felt that she was being pulled over. She went to the hospital on 10/5/2020.     Patient reports she has had this sensation only 2 times in

## 2020-10-29 NOTE — PATIENT INSTRUCTIONS
After your visit at the [address] [place] [address],[address] office  today, please direct any follow up questions or medication needs to the staff in our [place] office so that your concerns may be promptly addressed.   We are available through [employer] or at the numbers below: be picked up in office. • Please allow the office 2-3 business days to fill the prescription. • Patient must present photo ID at time of . PLEASE NOTE: PRESCRIPTIONS MUST BE PICKED UP PRIOR TO 3:00PM MONDAY-FRIDAY    Scheduling Tests:     If your submitting forms to office staff. • Form completion may require an additional fee. • A signed Release of Information (NISH) must be on file before forms may be submitted. When dropping off forms, please ask the  for this paper.    • Failure

## 2020-10-29 NOTE — PROGRESS NOTES
HPI:    Patient ID: Misty Cameron is a [de-identified]year old female. PCP: Dr Hdz July    HPI  Catherine Vasquez is a [de-identified]year old female with history of hypertension, hypothyroidism, colon cancer, anxiety who presented for evaluation of dizziness.  She reports on Oct (EUS) N/A 2/25/2014    Performed by Breann Franks MD at [address] [place] [address] 2/25/2014    Performed by Breann Franks MD at [facility]. 28.      not bso   • LUMPECTOMY RIGHT  2008    Invasive and DCIS   • MYRA B as needed. 30 tablet 5   • aspirin 81 MG Oral Chew Tab Chew 1 tablet (81 mg total) by mouth daily. 30 tablet 2   • carvedilol 6.25 MG Oral Tab Take 1 tablet (6.25 mg total) by mouth every morning.  (Patient taking differently: Take 6.25 mg by mouth 2 (two) strength. VIII: Normal hearing bilaterally. IX, X: Symmetric palate elevation. Uvula in midline. XI: Normal sternocleidomastoid and trapezius strength. XII: Tongue is in midline with normal lateral movements.   Sensory : Intact to all modalities inc

## 2020-11-04 ENCOUNTER — PATIENT MESSAGE (OUTPATIENT)
Dept: ADMINISTRATIVE | Facility: HOSPITAL | Age: 80
End: 2020-11-04

## 2020-11-06 ENCOUNTER — HOSPITAL ENCOUNTER (OUTPATIENT)
Dept: MRI IMAGING | Facility: HOSPITAL | Age: 80
Discharge: HOME | End: 2020-11-06
Attending: Other
Payer: MEDICARE

## 2020-11-06 DIAGNOSIS — R42 DIZZINESS: ICD-10-CM

## 2020-11-06 PROCEDURE — 70551 MRI BRAIN STEM W/O DYE: CPT | Performed by: OTHER

## 2020-11-16 ENCOUNTER — V-VISIT (OUTPATIENT)
Dept: CARDIOLOGY | Age: 80
End: 2020-11-16

## 2020-11-16 VITALS
HEART RATE: 65 BPM | BODY MASS INDEX: 29.81 KG/M2 | HEIGHT: 62 IN | SYSTOLIC BLOOD PRESSURE: 117 MMHG | WEIGHT: 162 LBS | DIASTOLIC BLOOD PRESSURE: 62 MMHG

## 2020-11-16 DIAGNOSIS — I10 HYPERTENSION, BENIGN: Primary | ICD-10-CM

## 2020-11-16 PROCEDURE — 99214 OFFICE O/P EST MOD 30 MIN: CPT | Performed by: NURSE PRACTITIONER

## 2020-11-16 ASSESSMENT — PATIENT HEALTH QUESTIONNAIRE - PHQ9
2. FEELING DOWN, DEPRESSED OR HOPELESS: NOT AT ALL
1. LITTLE INTEREST OR PLEASURE IN DOING THINGS: NOT AT ALL
SUM OF ALL RESPONSES TO PHQ9 QUESTIONS 1 AND 2: 0
CLINICAL INTERPRETATION OF PHQ9 SCORE: NO FURTHER SCREENING NEEDED
SUM OF ALL RESPONSES TO PHQ9 QUESTIONS 1 AND 2: 0
CLINICAL INTERPRETATION OF PHQ2 SCORE: NO FURTHER SCREENING NEEDED

## 2020-11-16 ASSESSMENT — ENCOUNTER SYMPTOMS
WEIGHT LOSS: 0
GASTROINTESTINAL NEGATIVE: 1
DECREASED APPETITE: 0
DIAPHORESIS: 0
NEUROLOGICAL NEGATIVE: 1
SYNCOPE: 0
SHORTNESS OF BREATH: 0
WEIGHT GAIN: 0

## 2020-11-20 NOTE — TELEPHONE ENCOUNTER
Patient's daughter notified of MRI results (ok per HIPAA consent).     ----- Message from Ruthie Alcantara MD sent at 11/20/2020 10:04 AM CST -----  No acute abnormality

## 2020-11-24 NOTE — PROGRESS NOTES
Multiple attempts to reach the pt with no returned phone call. Pt went to [place] with PCP's office on 10/15/20, closing encounter.

## 2020-12-16 ENCOUNTER — TELEPHONE (OUTPATIENT)
Dept: CARDIOLOGY | Age: 80
End: 2020-12-16

## 2021-01-15 DIAGNOSIS — E03.9 ACQUIRED HYPOTHYROIDISM: ICD-10-CM

## 2021-01-15 RX ORDER — LEVOTHYROXINE SODIUM 0.05 MG/1
TABLET ORAL
Qty: 90 TABLET | Refills: 0 | Status: SHIPPED | OUTPATIENT
Start: 2021-01-15 | End: 2021-02-09

## 2021-02-05 ENCOUNTER — TELEMEDICINE (OUTPATIENT)
Dept: FAMILY MEDICINE CLINIC | Facility: CLINIC | Age: 81
End: 2021-02-05
Payer: MEDICARE

## 2021-02-05 VITALS — BODY MASS INDEX: 31 KG/M2 | WEIGHT: 170 LBS | SYSTOLIC BLOOD PRESSURE: 132 MMHG | DIASTOLIC BLOOD PRESSURE: 87 MMHG

## 2021-02-05 DIAGNOSIS — E78.00 PURE HYPERCHOLESTEROLEMIA: ICD-10-CM

## 2021-02-05 DIAGNOSIS — Z00.00 MEDICARE ANNUAL WELLNESS VISIT, SUBSEQUENT: Primary | ICD-10-CM

## 2021-02-05 DIAGNOSIS — I10 ESSENTIAL HYPERTENSION, BENIGN: ICD-10-CM

## 2021-02-05 DIAGNOSIS — Z78.0 ASYMPTOMATIC MENOPAUSAL STATE: ICD-10-CM

## 2021-02-05 DIAGNOSIS — E55.9 VITAMIN D DEFICIENCY: ICD-10-CM

## 2021-02-05 DIAGNOSIS — Z12.31 VISIT FOR SCREENING MAMMOGRAM: ICD-10-CM

## 2021-02-05 DIAGNOSIS — E03.9 ACQUIRED HYPOTHYROIDISM: ICD-10-CM

## 2021-02-05 PROBLEM — R42 DIZZINESS: Status: RESOLVED | Noted: 2020-10-05 | Resolved: 2021-02-05

## 2021-02-05 PROBLEM — R77.8 ELEVATED TROPONIN: Status: RESOLVED | Noted: 2020-10-05 | Resolved: 2021-02-05

## 2021-02-05 PROBLEM — R79.89 ELEVATED TROPONIN: Status: RESOLVED | Noted: 2020-10-05 | Resolved: 2021-02-05

## 2021-02-05 PROCEDURE — G0439 PPPS, SUBSEQ VISIT: HCPCS | Performed by: FAMILY MEDICINE

## 2021-02-05 PROCEDURE — G0444 DEPRESSION SCREEN ANNUAL: HCPCS | Performed by: FAMILY MEDICINE

## 2021-02-05 NOTE — PROGRESS NOTES
Paxton Hollis is a 80year old female who presents for a Medicare Annual Wellness visit  HPI:    Patient Care Team: Patient Care Team:  Aliyah Spencer MD as PCP - General (Family Practice)  Aliyah Spencer MD as PCP - Uziel Gallegos MD ([address] Glucosamine-Fish Oil-EPA-DHA (GLUCOSAMINE & FISH OIL OR) Take 1 capsule by mouth daily. • vitamin E 400 UNITS Oral Cap Take 400 Units by mouth daily.          Wt Readings from Last 6 Encounters:  02/05/21 : 170 lb (77.1 kg)  10/15/20 : 167 lb (75.8 kg Pneumococcal?: Yes         Fall/Risk Assessment     Do you have 3 or more medical conditions?: 0-No    Have you fallen in the last 12 months?: 0-No    Do you accidently lose urine?: 0-No    Do you have difficulty seeing?: 0-No    Do you have any difficulty Health Maintenance if applicable    Flex Sigmoidoscopy Screen every 5 years No results found for this or any previous visit. No flowsheet data found. Fecal Occult Blood Annually No results found for: FOB, OCCULTSTOOL No flowsheet data found.     Mark Leroy T-score better than -1  Osteopenia: T-score between -1 and -2.5  Osteoporosis:  T-score less than -2.5           Dictated by: Yvette Nelson MD on 4/17/2018 at 11:51       Approved by: Yvette Nelson MD                Pap and Pelvic      Pap: Every 3 yrs Annually No results found for: A1C No flowsheet data found. Creat/alb ratio  Annually      LDL  Annually LDL Cholesterol (mg/dL)   Date Value   06/24/2020 102 (H)     LDL CHOLESTROL (mg/dL)   Date Value   01/16/2014 94    No flowsheet data found.      Judith Stewart Cancer ([facility]    • Cancer, colon ([facility]    • Exposure to unspecified radiation     2008/breast   • Fatigue    • HIGH BLOOD PRESSURE    • HIGH CHOLESTEROL    • Hyperparathyroidism ([facility]    • Hypothyroid    • Malignant neoplasm of breast (female), unspecified otherwise  SKIN: denies any unusual skin lesions  EYES: denies blurred vision or double vision  HEENT: denies nasal congestion, sinus pain or ST  LUNGS: denies shortness of breath with exertion  CARDIOVASCULAR: denies chest pain on exertion  GI: denies abd Standing Status: Future          Standing Expiration Date: 2/5/2022      LIPID PANEL          Standing Status: Future          Standing Expiration Date: 2/5/2022      CMP          Standing Status: Future          Standing Expiration Date: 2/5/2022      CBC

## 2021-02-09 ENCOUNTER — HOSPITAL ENCOUNTER (OUTPATIENT)
Facility: HOSPITAL | Age: 81
Setting detail: OBSERVATION
Discharge: HOME | End: 2021-02-12
Attending: EMERGENCY MEDICINE | Admitting: HOSPITALIST
Payer: MEDICARE

## 2021-02-09 ENCOUNTER — APPOINTMENT (OUTPATIENT)
Dept: CT IMAGING | Facility: HOSPITAL | Age: 81
End: 2021-02-09
Attending: EMERGENCY MEDICINE
Payer: MEDICARE

## 2021-02-09 ENCOUNTER — APPOINTMENT (OUTPATIENT)
Dept: GENERAL RADIOLOGY | Facility: HOSPITAL | Age: 81
End: 2021-02-09
Attending: EMERGENCY MEDICINE
Payer: MEDICARE

## 2021-02-09 DIAGNOSIS — R73.9 HYPERGLYCEMIA: Primary | ICD-10-CM

## 2021-02-09 DIAGNOSIS — R07.9 CHEST PAIN OF UNCERTAIN ETIOLOGY: ICD-10-CM

## 2021-02-09 LAB
ALBUMIN SERPL-MCNC: 3.7 G/DL (ref 3.4–5)
ALBUMIN/GLOB SERPL: 1.2 {RATIO} (ref 1–2)
ALP LIVER SERPL-CCNC: 86 U/L
ALT SERPL-CCNC: 21 U/L
ANION GAP SERPL CALC-SCNC: 7 MMOL/L (ref 0–18)
APTT PPP: 34.9 SECONDS (ref 25.4–36.1)
AST SERPL-CCNC: 14 U/L (ref 15–37)
ATRIAL RATE: 90 BPM
ATRIAL RATE: 94 BPM
BASOPHILS # BLD AUTO: 0.04 X10(3) UL (ref 0–0.2)
BASOPHILS NFR BLD AUTO: 0.9 %
BILIRUB SERPL-MCNC: 0.8 MG/DL (ref 0.1–2)
BUN BLD-MCNC: 13 MG/DL (ref 7–18)
BUN/CREAT SERPL: 14.3 (ref 10–20)
CALCIUM BLD-MCNC: 8.9 MG/DL (ref 8.5–10.1)
CHLORIDE SERPL-SCNC: 107 MMOL/L (ref 98–112)
CO2 SERPL-SCNC: 26 MMOL/L (ref 21–32)
CREAT BLD-MCNC: 0.91 MG/DL
DEPRECATED RDW RBC AUTO: 41.7 FL (ref 35.1–46.3)
EOSINOPHIL # BLD AUTO: 0.16 X10(3) UL (ref 0–0.7)
EOSINOPHIL NFR BLD AUTO: 3.6 %
ERYTHROCYTE [DISTWIDTH] IN BLOOD BY AUTOMATED COUNT: 12.3 % (ref 11–15)
GLOBULIN PLAS-MCNC: 3.2 G/DL (ref 2.8–4.4)
GLUCOSE BLD-MCNC: 164 MG/DL (ref 70–99)
HCT VFR BLD AUTO: 39.9 %
HGB BLD-MCNC: 13 G/DL
IMM GRANULOCYTES # BLD AUTO: 0.02 X10(3) UL (ref 0–1)
IMM GRANULOCYTES NFR BLD: 0.5 %
LYMPHOCYTES # BLD AUTO: 1.2 X10(3) UL (ref 1–4)
LYMPHOCYTES NFR BLD AUTO: 27.2 %
M PROTEIN MFR SERPL ELPH: 6.9 G/DL (ref 6.4–8.2)
MCH RBC QN AUTO: 30.1 PG (ref 26–34)
MCHC RBC AUTO-ENTMCNC: 32.6 G/DL (ref 31–37)
MCV RBC AUTO: 92.4 FL
MONOCYTES # BLD AUTO: 0.43 X10(3) UL (ref 0.1–1)
MONOCYTES NFR BLD AUTO: 9.8 %
NEUTROPHILS # BLD AUTO: 2.56 X10 (3) UL (ref 1.5–7.7)
NEUTROPHILS # BLD AUTO: 2.56 X10(3) UL (ref 1.5–7.7)
NEUTROPHILS NFR BLD AUTO: 58 %
OSMOLALITY SERPL CALC.SUM OF ELEC: 294 MOSM/KG (ref 275–295)
PLATELET # BLD AUTO: 191 10(3)UL (ref 150–450)
POTASSIUM SERPL-SCNC: 3.7 MMOL/L (ref 3.5–5.1)
PROCALCITONIN SERPL-MCNC: 0.06 NG/ML (ref ?–0.16)
Q-T INTERVAL: 430 MS
Q-T INTERVAL: 438 MS
QRS DURATION: 84 MS
QRS DURATION: 86 MS
QTC CALCULATION (BEZET): 403 MS
QTC CALCULATION (BEZET): 444 MS
R AXIS: -25 DEGREES
R AXIS: -27 DEGREES
RBC # BLD AUTO: 4.32 X10(6)UL
SARS-COV-2 RNA RESP QL NAA+PROBE: NOT DETECTED
SODIUM SERPL-SCNC: 140 MMOL/L (ref 136–145)
T AXIS: -1 DEGREES
T AXIS: -28 DEGREES
TROPONIN I SERPL-MCNC: 0.65 NG/ML (ref ?–0.04)
TROPONIN I SERPL-MCNC: 5.43 NG/ML (ref ?–0.04)
TROPONIN I SERPL-MCNC: 5.86 NG/ML (ref ?–0.04)
VENTRICULAR RATE: 53 BPM
VENTRICULAR RATE: 62 BPM
WBC # BLD AUTO: 4.4 X10(3) UL (ref 4–11)

## 2021-02-09 PROCEDURE — 74175 CTA ABDOMEN W/CONTRAST: CPT | Performed by: EMERGENCY MEDICINE

## 2021-02-09 PROCEDURE — 99215 OFFICE O/P EST HI 40 MIN: CPT | Performed by: INTERNAL MEDICINE

## 2021-02-09 PROCEDURE — 99220 INITIAL OBSERVATION CARE,LEVL III: CPT | Performed by: INTERNAL MEDICINE

## 2021-02-09 PROCEDURE — 71045 X-RAY EXAM CHEST 1 VIEW: CPT | Performed by: EMERGENCY MEDICINE

## 2021-02-09 PROCEDURE — 71275 CT ANGIOGRAPHY CHEST: CPT | Performed by: EMERGENCY MEDICINE

## 2021-02-09 RX ORDER — NITROGLYCERIN 0.4 MG/1
0.4 TABLET SUBLINGUAL EVERY 5 MIN PRN
Status: DISCONTINUED | OUTPATIENT
Start: 2021-02-09 | End: 2021-02-12

## 2021-02-09 RX ORDER — LEVOTHYROXINE SODIUM 0.05 MG/1
50 TABLET ORAL
COMMUNITY
End: 2021-06-01

## 2021-02-09 RX ORDER — ASPIRIN 81 MG/1
324 TABLET, CHEWABLE ORAL ONCE
Status: COMPLETED | OUTPATIENT
Start: 2021-02-09 | End: 2021-02-09

## 2021-02-09 RX ORDER — NITROGLYCERIN 20 MG/100ML
INJECTION INTRAVENOUS CONTINUOUS
Status: DISCONTINUED | OUTPATIENT
Start: 2021-02-09 | End: 2021-02-11

## 2021-02-09 RX ORDER — HYDROCHLOROTHIAZIDE 25 MG/1
12.5 TABLET ORAL DAILY
Status: DISCONTINUED | OUTPATIENT
Start: 2021-02-09 | End: 2021-02-10

## 2021-02-09 RX ORDER — ENOXAPARIN SODIUM 100 MG/ML
40 INJECTION SUBCUTANEOUS DAILY
Status: DISCONTINUED | OUTPATIENT
Start: 2021-02-10 | End: 2021-02-09

## 2021-02-09 RX ORDER — MAGNESIUM HYDROXIDE/ALUMINUM HYDROXICE/SIMETHICONE 120; 1200; 1200 MG/30ML; MG/30ML; MG/30ML
30 SUSPENSION ORAL ONCE
Status: COMPLETED | OUTPATIENT
Start: 2021-02-09 | End: 2021-02-09

## 2021-02-09 RX ORDER — ASPIRIN 81 MG/1
81 TABLET ORAL EVERY EVENING
COMMUNITY

## 2021-02-09 RX ORDER — CARVEDILOL 6.25 MG/1
6.25 TABLET ORAL 2 TIMES DAILY WITH MEALS
Status: ON HOLD | COMMUNITY
End: 2021-02-12

## 2021-02-09 RX ORDER — HEPARIN SODIUM AND DEXTROSE 10000; 5 [USP'U]/100ML; G/100ML
12 INJECTION INTRAVENOUS ONCE
Status: COMPLETED | OUTPATIENT
Start: 2021-02-09 | End: 2021-02-10

## 2021-02-09 RX ORDER — CARVEDILOL 6.25 MG/1
6.25 TABLET ORAL 2 TIMES DAILY
Status: DISCONTINUED | OUTPATIENT
Start: 2021-02-09 | End: 2021-02-12

## 2021-02-09 RX ORDER — ASPIRIN 81 MG/1
81 TABLET, CHEWABLE ORAL DAILY
Status: DISCONTINUED | OUTPATIENT
Start: 2021-02-10 | End: 2021-02-12

## 2021-02-09 RX ORDER — ATORVASTATIN CALCIUM 10 MG/1
30 TABLET, FILM COATED ORAL NIGHTLY
Status: DISCONTINUED | OUTPATIENT
Start: 2021-02-09 | End: 2021-02-12

## 2021-02-09 RX ORDER — LOSARTAN POTASSIUM 50 MG/1
50 TABLET ORAL DAILY
Status: DISCONTINUED | OUTPATIENT
Start: 2021-02-10 | End: 2021-02-12

## 2021-02-09 RX ORDER — ALPRAZOLAM 0.25 MG/1
0.25 TABLET ORAL NIGHTLY PRN
Status: DISCONTINUED | OUTPATIENT
Start: 2021-02-09 | End: 2021-02-12

## 2021-02-09 RX ORDER — METOCLOPRAMIDE HYDROCHLORIDE 5 MG/ML
5 INJECTION INTRAMUSCULAR; INTRAVENOUS EVERY 8 HOURS PRN
Status: DISCONTINUED | OUTPATIENT
Start: 2021-02-09 | End: 2021-02-12

## 2021-02-09 RX ORDER — HEPARIN SODIUM 5000 [USP'U]/ML
60 INJECTION INTRAVENOUS; SUBCUTANEOUS ONCE
Status: COMPLETED | OUTPATIENT
Start: 2021-02-09 | End: 2021-02-09

## 2021-02-09 RX ORDER — ONDANSETRON 2 MG/ML
4 INJECTION INTRAMUSCULAR; INTRAVENOUS EVERY 6 HOURS PRN
Status: DISCONTINUED | OUTPATIENT
Start: 2021-02-09 | End: 2021-02-12

## 2021-02-09 RX ORDER — HEPARIN SODIUM AND DEXTROSE 10000; 5 [USP'U]/100ML; G/100ML
INJECTION INTRAVENOUS CONTINUOUS
Status: DISCONTINUED | OUTPATIENT
Start: 2021-02-10 | End: 2021-02-10

## 2021-02-09 RX ORDER — LEVOTHYROXINE SODIUM 0.05 MG/1
50 TABLET ORAL
Status: DISCONTINUED | OUTPATIENT
Start: 2021-02-10 | End: 2021-02-12

## 2021-02-09 NOTE — PROGRESS NOTES
[facility] Note:  Renal Dose Adjustment for Metoclopramide (REGLAN)    Georgia Tabor has been prescribed Metoclopramide (REGLAN) 10 mg every 8 hours as needed for n/v.    Estimated Creatinine Clearance: 38.3 mL/min (based on SCr of 0.91 mg/dL).     Calc

## 2021-02-09 NOTE — ED PROVIDER NOTES
Patient Seen in: [facility] Emergency Department      History   Patient presents with:  Chest Pain Angina    Stated Complaint: chest pain     HPI/Subjective:   HPI  This is a 44-year-old female who presents with complaints of chest pain.   She states removal od cancerous lesion   • ENDOSCOPIC ULTRASOUND (EUS) N/A 2/25/2014    Performed by Uriel Willis MD at [address] [place] [address] 2/25/2014    Performed by Uriel Willis MD at [facility]. 28.      not bso   • abdomen is soft nondistended nontender. There is no rebound. There is no guarding. EXT: There is good pulses bilaterally. There is no calf tenderness. There is no rash noted. There is no edema    NEURO: Alert and oriented x3.   Muscle strength and s progression is noted when compared to an old EKG there is no significant changes      The patient did have a stress echo in October 2020. Which showed      IMPRESSION:  1. No evidence of acute myocardial ischemia.   2.  Normal left ventricular wall motion changes, PVCs, poor R wave progression there is no acute ST elevations or ischemic findings. rate is 53    The patient's procalcitonin was not elevated, the patient's rapid Covid was not detected.   The troponin was elevated here but on reviewing her chart s (CPT=71275/51526)  COMPARISON:  [facility] , CT, CT ANGIOGRAPHY, CHEST (CPT=71275), 10/05/2020, 9:18 PM.  [facility] , XR, XR CHEST AP PORTABLE  (CPT=71045), 2/09/2021, 1:15 PM.  INDICATIONS:  chest pain  TECHNIQUE:  CT images were created without and with non-ion No aneurysm or dissection. RETROPERITONEUM:  No adenopathy or mass. BOWEL/MESENTERY:  Thickening of the distal esophagus can be seen with reflux disease and esophagitis. Diverticular disease of the left colon without ends of diverticulitis.   The appendi

## 2021-02-09 NOTE — CONSULTS
[facility] Cardiology Consult Note    Shaheed Montague Patient Status:  Emergency    1940 MRN [id number]   Location [address] [facility] Attending Willie Palencia MD   Hosp Day # 0 PCP Teodora Lind MD       HPI:  61-year-old female evidence of acute myocardial ischemia. 2.  Normal left ventricular wall motion.    3.  EKG response to regadenoson is normal.       EKG- Sinus yosef with PAC's and PVC      Assessment and Plan:      A:  - HTN urgency  - Atypical CP  - Clinical suspicious f • ANTERIOR POSTERIOR REPAIR N/A 3/5/2015    Performed by Beto Hernandez MD at [facility] MAIN OR   • [address] [facility]      lumpectomy, right   • CHOLECYSTECTOMY  1/1/74   • COLON SURGERY      removal od cancerous lesion   • ENDOSCOPIC ULTRASOU and dry.        [facility]  2/9/2021  3:38 PM

## 2021-02-09 NOTE — H&P
[facility]  History and Physical     [facility] Patient Status[facility]  Observation    1940 MRN [id number]   [facility] 8NE-A Attending Lidya Nettles MD   Hosp Day # 0 PCP Stew Mittal MD     Chief Complaint: chest pain, b 2007    DCIS   • NM PARATHYROID SURGERY  (CPT=78070)  2008   • OTHER  1/1/06    lithotripsy   • OTHER      parathyroid resection   • RADIATION RIGHT     • VAGINIAL HYSTERECTOMY, VAGINAL VAULT SUSPENSION, AP REPAIR, TVT, CYSTO N/A 4/25/2014    Performed by (COQ10) 100 MG Oral Cap, Take 1 tablet by mouth daily. , Disp: , Rfl:     •  Glucosamine-Fish Oil-EPA-DHA (GLUCOSAMINE & FISH OIL OR), Take 1 capsule by mouth daily. , Disp: , Rfl:     •  vitamin E 400 UNITS Oral Cap, Take 400 Units by mouth daily.   Rice Swati with troponin elevation(chronic)   1. Serial troponin  2. CTA chest   3. Cardiology eval   4. BP control   5. Recent stress test and CTA coronary were negative   6. ASA, statin,BB  2. Dysphagia   1. Await CT  2. Hx of hiatal hernia   3. PPI trial   4.  Cons

## 2021-02-10 ENCOUNTER — APPOINTMENT (OUTPATIENT)
Dept: INTERVENTIONAL RADIOLOGY/VASCULAR | Facility: HOSPITAL | Age: 81
End: 2021-02-10
Attending: NURSE PRACTITIONER
Payer: MEDICARE

## 2021-02-10 ENCOUNTER — APPOINTMENT (OUTPATIENT)
Dept: CV DIAGNOSTICS | Facility: HOSPITAL | Age: 81
End: 2021-02-10
Attending: INTERNAL MEDICINE
Payer: MEDICARE

## 2021-02-10 LAB
ANION GAP SERPL CALC-SCNC: 8 MMOL/L (ref 0–18)
APTT PPP: 107.2 SECONDS (ref 25.4–36.1)
APTT PPP: 187 SECONDS (ref 25.4–36.1)
BUN BLD-MCNC: 13 MG/DL (ref 7–18)
BUN/CREAT SERPL: 15.3 (ref 10–20)
CALCIUM BLD-MCNC: 9 MG/DL (ref 8.5–10.1)
CHLORIDE SERPL-SCNC: 108 MMOL/L (ref 98–112)
CO2 SERPL-SCNC: 26 MMOL/L (ref 21–32)
CREAT BLD-MCNC: 0.85 MG/DL
DEPRECATED RDW RBC AUTO: 41.7 FL (ref 35.1–46.3)
ERYTHROCYTE [DISTWIDTH] IN BLOOD BY AUTOMATED COUNT: 12.8 % (ref 11–15)
EST. AVERAGE GLUCOSE BLD GHB EST-MCNC: 163 MG/DL (ref 68–126)
GLUCOSE BLD-MCNC: 161 MG/DL (ref 70–99)
HBA1C MFR BLD HPLC: 7.3 % (ref ?–5.7)
HCT VFR BLD AUTO: 38.6 %
HGB BLD-MCNC: 12.8 G/DL
MCH RBC QN AUTO: 30 PG (ref 26–34)
MCHC RBC AUTO-ENTMCNC: 33.2 G/DL (ref 31–37)
MCV RBC AUTO: 90.4 FL
OSMOLALITY SERPL CALC.SUM OF ELEC: 298 MOSM/KG (ref 275–295)
PLATELET # BLD AUTO: 178 10(3)UL (ref 150–450)
PLATELET # BLD AUTO: 183 10(3)UL (ref 150–450)
POTASSIUM SERPL-SCNC: 3.5 MMOL/L (ref 3.5–5.1)
RBC # BLD AUTO: 4.27 X10(6)UL
SODIUM SERPL-SCNC: 142 MMOL/L (ref 136–145)
TROPONIN I SERPL-MCNC: 5.46 NG/ML (ref ?–0.04)
WBC # BLD AUTO: 5.4 X10(3) UL (ref 4–11)

## 2021-02-10 PROCEDURE — B2151ZZ FLUOROSCOPY OF LEFT HEART USING LOW OSMOLAR CONTRAST: ICD-10-PCS | Performed by: INTERNAL MEDICINE

## 2021-02-10 PROCEDURE — 99225 SUBSEQUENT OBSERVATION CARE: CPT | Performed by: INTERNAL MEDICINE

## 2021-02-10 PROCEDURE — 4A023N7 MEASUREMENT OF CARDIAC SAMPLING AND PRESSURE, LEFT HEART, PERCUTANEOUS APPROACH: ICD-10-PCS | Performed by: INTERNAL MEDICINE

## 2021-02-10 PROCEDURE — 93458 L HRT ARTERY/VENTRICLE ANGIO: CPT | Performed by: INTERNAL MEDICINE

## 2021-02-10 PROCEDURE — B2111ZZ FLUOROSCOPY OF MULTIPLE CORONARY ARTERIES USING LOW OSMOLAR CONTRAST: ICD-10-PCS | Performed by: INTERNAL MEDICINE

## 2021-02-10 PROCEDURE — 93306 TTE W/DOPPLER COMPLETE: CPT | Performed by: INTERNAL MEDICINE

## 2021-02-10 RX ORDER — SODIUM CHLORIDE 9 MG/ML
INJECTION, SOLUTION INTRAVENOUS CONTINUOUS
Status: ACTIVE | OUTPATIENT
Start: 2021-02-10 | End: 2021-02-10

## 2021-02-10 RX ORDER — FUROSEMIDE 10 MG/ML
20 INJECTION INTRAMUSCULAR; INTRAVENOUS ONCE
Status: COMPLETED | OUTPATIENT
Start: 2021-02-10 | End: 2021-02-10

## 2021-02-10 RX ORDER — MIDAZOLAM HYDROCHLORIDE 1 MG/ML
INJECTION INTRAMUSCULAR; INTRAVENOUS
Status: COMPLETED
Start: 2021-02-10 | End: 2021-02-10

## 2021-02-10 RX ORDER — LIDOCAINE HYDROCHLORIDE 10 MG/ML
INJECTION, SOLUTION EPIDURAL; INFILTRATION; INTRACAUDAL; PERINEURAL
Status: COMPLETED
Start: 2021-02-10 | End: 2021-02-10

## 2021-02-10 RX ORDER — SODIUM CHLORIDE 9 MG/ML
INJECTION, SOLUTION INTRAVENOUS CONTINUOUS
Status: DISCONTINUED | OUTPATIENT
Start: 2021-02-10 | End: 2021-02-11

## 2021-02-10 RX ORDER — HEPARIN SODIUM 5000 [USP'U]/ML
INJECTION, SOLUTION INTRAVENOUS; SUBCUTANEOUS
Status: COMPLETED
Start: 2021-02-10 | End: 2021-02-10

## 2021-02-10 RX ORDER — SODIUM CHLORIDE 9 MG/ML
INJECTION, SOLUTION INTRAVENOUS
Status: DISCONTINUED | OUTPATIENT
Start: 2021-02-11 | End: 2021-02-10 | Stop reason: HOSPADM

## 2021-02-10 NOTE — PLAN OF CARE
Called by RN for increasing chest pain rated 5/10. Resting in bed. Nitroglycerin gtt increased. Also c/o nausea and diaphoresis this am, which has since resolved. Noted troponin 5.86 this am. EKG without ischemic changes. Dr. Justin Martínez at bedside.  Plan for [facility]

## 2021-02-10 NOTE — PLAN OF CARE
Received patient at [phone] from ED. Has been having chest pain that radiates to back and left shoulder. Alert and Oriented x4. Tele Rhythm NSR with occasional PAC. O2 saturation 96% On room air. Breath sounds clear. Pt oriented to room and unit.  Reviewed south

## 2021-02-10 NOTE — PLAN OF CARE
Received pt from cath lab s/p [facility]. No intervention. Right groin soft, dressing c/d/I. Pedal pulse palpable. Denies chest pain, chest discomfort, sob, n/v.     Nitrogylcerine gtt weaned down. Recovered from bed rest and ambulated in room. AOx4.   Up with Progressing

## 2021-02-10 NOTE — PLAN OF CARE
Assumed careof pt. At 1. Pt resting in bed, no complaints at this time. Pleasant and cooperative with staff. Call light within reach, pt. Able to use. Calls appropriately. Aox4. RA sats 96%, pt. Stated mild aching chest pain at 2/10.  Nigroglycerin g monitoring    - See additional Care Plan goals for specific interventions  Outcome: Progressing  Goal: Patient/Family Short Term Goal  Description: Patient's Short Term Goal: no pain    Interventions:   - - labs, troponin, cardiac consult, tele monitoring

## 2021-02-10 NOTE — BH PROGRESS NOTE
Patient reported increased chest pain with no relief from therapy. Roselyn Walker notified re: increased nitroglycerin 7.5 ml and stat EKG.

## 2021-02-10 NOTE — PROGRESS NOTES
[facility]  Progress Note     Ander Lamar Patient Status:  Observation    1940 MRN [id number]   [facility] 8NE-A Attending Cyrus Ordaz MD   Hosp Day # 0 PCP Fiorella Harden MD     Chief Complaint: dysphagia    S: Patien Imaging data reviewed in Epic.     Medications:   • furosemide  20 mg Intravenous Once   • aspirin  81 mg Oral Daily   • carvedilol  6.25 mg Oral BID   • atorvastatin  30 mg Oral Nightly   • losartan Potassium  50 mg Oral Daily   • Levothyroxine Sodium  50

## 2021-02-10 NOTE — PROCEDURES
[facility]    [facility] Cardiac Cath Procedure Note  Areatha Heimlich Patient Status:  Observation    1940 MRN [id number]   [facility] 8NE-A Attending Lidya Nettles MD   Hosp Day # 0 PCP Stew Mittal MD       Cardiologist: Dr. Mariano Rios tolerated  - Will gently start diuresing  - Discussed at length with patient and her family       Description of Procedure:   After written informed consent was obtained from the patient, patient was brought to the cardiac catheterization laboratory.   Ingrid

## 2021-02-10 NOTE — CONSULTS
[facility]                       Gastroenterology [address] [facility] Gastroenterology    Lesa Puentes Patient Status:  Observation    1940 MRN [id number]   [facility] 8NE-A Attending Lokesh Arias MD   [facility] Sravani Velazquez MD at [facility] MAIN OR   • BREAST SURGERY PROCEDURE UNLISTED      lumpectomy, right   • CHOLECYSTECTOMY  1/1/74   • COLON SURGERY      removal od cancerous lesion   • ENDOSCOPIC ULTRASOUND (EUS) N/A 2/25/2014    Performed by Reji Heller MD at  Oral, Before breakfast    •  nitroGLYCERIN (NITROSTAT) SL tab 0.4 mg, 0.4 mg, Sublingual, Q5 Min PRN    •  ondansetron HCl (ZOFRAN) injection 4 mg, 4 mg, Intravenous, Q6H PRN    •  Metoclopramide HCl (REGLAN) injection 5 mg, 5 mg, Intravenous, Q8H PRN    • dysuria, or nephrolithiasis           Psychiatric: The patient reports no history of depression, anxiety, suicidal ideation, or homicidal ideation           Oncologic: The patient reports on history of prior solid tumor or hematologic malignancy 4. 32 4.27   HGB 13.0 12.8   HCT 39.9 38.6   MCV 92.4 90.4   MCH 30.1 30.0   MCHC 32.6 33.2   RDW 12.3 12.8   NEPRELIM 2.56  --    WBC 4.4 5.4   .0 178.0  183.0       Recent Labs   Lab 02/09/21  1252   ALT 21   AST 14*       Assessment and Plan:

## 2021-02-11 ENCOUNTER — APPOINTMENT (OUTPATIENT)
Dept: GENERAL RADIOLOGY | Facility: HOSPITAL | Age: 81
End: 2021-02-11
Attending: INTERNAL MEDICINE
Payer: MEDICARE

## 2021-02-11 LAB
ANION GAP SERPL CALC-SCNC: 5 MMOL/L (ref 0–18)
ATRIAL RATE: 100 BPM
ATRIAL RATE: 59 BPM
ATRIAL RATE: 67 BPM
ATRIAL RATE: 70 BPM
BASOPHILS # BLD AUTO: 0.03 X10(3) UL (ref 0–0.2)
BASOPHILS NFR BLD AUTO: 0.5 %
BUN BLD-MCNC: 19 MG/DL (ref 7–18)
BUN/CREAT SERPL: 17.3 (ref 10–20)
CALCIUM BLD-MCNC: 8.5 MG/DL (ref 8.5–10.1)
CHLORIDE SERPL-SCNC: 107 MMOL/L (ref 98–112)
CO2 SERPL-SCNC: 28 MMOL/L (ref 21–32)
CREAT BLD-MCNC: 1.1 MG/DL
DEPRECATED RDW RBC AUTO: 43 FL (ref 35.1–46.3)
EOSINOPHIL # BLD AUTO: 0.12 X10(3) UL (ref 0–0.7)
EOSINOPHIL NFR BLD AUTO: 2.2 %
ERYTHROCYTE [DISTWIDTH] IN BLOOD BY AUTOMATED COUNT: 12.9 % (ref 11–15)
GLUCOSE BLD-MCNC: 112 MG/DL (ref 70–99)
HAV IGM SER QL: 2 MG/DL (ref 1.6–2.6)
HCT VFR BLD AUTO: 34.7 %
HGB BLD-MCNC: 11.3 G/DL
IMM GRANULOCYTES # BLD AUTO: 0.01 X10(3) UL (ref 0–1)
IMM GRANULOCYTES NFR BLD: 0.2 %
LYMPHOCYTES # BLD AUTO: 1.41 X10(3) UL (ref 1–4)
LYMPHOCYTES NFR BLD AUTO: 25.4 %
MCH RBC QN AUTO: 29.7 PG (ref 26–34)
MCHC RBC AUTO-ENTMCNC: 32.6 G/DL (ref 31–37)
MCV RBC AUTO: 91.1 FL
MONOCYTES # BLD AUTO: 0.74 X10(3) UL (ref 0.1–1)
MONOCYTES NFR BLD AUTO: 13.3 %
NEUTROPHILS # BLD AUTO: 3.25 X10 (3) UL (ref 1.5–7.7)
NEUTROPHILS # BLD AUTO: 3.25 X10(3) UL (ref 1.5–7.7)
NEUTROPHILS NFR BLD AUTO: 58.4 %
OSMOLALITY SERPL CALC.SUM OF ELEC: 293 MOSM/KG (ref 275–295)
P AXIS: 39 DEGREES
P AXIS: 69 DEGREES
P-R INTERVAL: 202 MS
P-R INTERVAL: 224 MS
PLATELET # BLD AUTO: 169 10(3)UL (ref 150–450)
PLATELET # BLD AUTO: 169 10(3)UL (ref 150–450)
POTASSIUM SERPL-SCNC: 3.4 MMOL/L (ref 3.5–5.1)
POTASSIUM SERPL-SCNC: 5.1 MMOL/L (ref 3.5–5.1)
Q-T INTERVAL: 430 MS
Q-T INTERVAL: 432 MS
QRS DURATION: 86 MS
QRS DURATION: 90 MS
QTC CALCULATION (BEZET): 425 MS
QTC CALCULATION (BEZET): 434 MS
QTC CALCULATION (BEZET): 454 MS
QTC CALCULATION (BEZET): 464 MS
R AXIS: -10 DEGREES
R AXIS: -4 DEGREES
R AXIS: -8 DEGREES
R AXIS: 3 DEGREES
RBC # BLD AUTO: 3.81 X10(6)UL
SODIUM SERPL-SCNC: 140 MMOL/L (ref 136–145)
T AXIS: 13 DEGREES
T AXIS: 28 DEGREES
T AXIS: 35 DEGREES
T AXIS: 56 DEGREES
VENTRICULAR RATE: 59 BPM
VENTRICULAR RATE: 61 BPM
VENTRICULAR RATE: 67 BPM
VENTRICULAR RATE: 70 BPM
WBC # BLD AUTO: 5.6 X10(3) UL (ref 4–11)

## 2021-02-11 PROCEDURE — 99214 OFFICE O/P EST MOD 30 MIN: CPT | Performed by: INTERNAL MEDICINE

## 2021-02-11 PROCEDURE — 99225 SUBSEQUENT OBSERVATION CARE: CPT | Performed by: INTERNAL MEDICINE

## 2021-02-11 PROCEDURE — 74221 X-RAY XM ESOPHAGUS 2CNTRST: CPT | Performed by: INTERNAL MEDICINE

## 2021-02-11 RX ORDER — POTASSIUM CHLORIDE 1.5 G/1.77G
40 POWDER, FOR SOLUTION ORAL EVERY 4 HOURS
Status: COMPLETED | OUTPATIENT
Start: 2021-02-11 | End: 2021-02-11

## 2021-02-11 RX ORDER — FUROSEMIDE 20 MG/1
10 TABLET ORAL DAILY
Status: DISCONTINUED | OUTPATIENT
Start: 2021-02-12 | End: 2021-02-12

## 2021-02-11 RX ORDER — FUROSEMIDE 20 MG/1
10 TABLET ORAL DAILY
Status: DISCONTINUED | OUTPATIENT
Start: 2021-02-11 | End: 2021-02-11

## 2021-02-11 RX ORDER — DEXTROSE MONOHYDRATE 25 G/50ML
50 INJECTION, SOLUTION INTRAVENOUS
Status: DISCONTINUED | OUTPATIENT
Start: 2021-02-11 | End: 2021-02-12

## 2021-02-11 NOTE — PLAN OF CARE
Received patient, alert and oriented. Denied chest pain, denied SOB. NTG drip 5 mcg/min stopped. Discussed POC. Due meds given. Right groin soft with clean, dry and intact dressing. Safety measures reinforced, call light within reach. Bed alarm on.  Needs a

## 2021-02-11 NOTE — PLAN OF CARE
AOx4.  Independent. Up with standby assist.    Ambulated with pt in hallway. NSR with PAC's on cardiac monitor. K 3.4, replaced. Adequate saturation on RA. Lung sounds diminished. Right groin soft, dressing c/d/I. Pedal pulse palpable.   Denies chest Progressing

## 2021-02-11 NOTE — PROGRESS NOTES
[facility]  Progress Note     Zandra Finn Patient Status:  Observation    1940 MRN [id number]   [facility] 8NE-A Attending Tonio Kelsey MD   Hosp Day # 0 PCP Annette Ch MD     Chief Complaint: CMP    S: Patient just Lab 02/09/21  1705 02/09/21  2247 02/10/21  0830   TROP 5.430* 5.860* 5.460*            Imaging: Imaging data reviewed in Epic.     Medications:   • [START ON 2/12/2021] furosemide  10 mg Oral Daily   • pantoprazole (PROTONIX) IV push  40 mg Intravenous Q

## 2021-02-11 NOTE — PROGRESS NOTES
[facility]  Cardiology Progress Note    Etta Sanchezn Patient Status:  Observation    1940 MRN [id number]   [facility] 8NE-A Attending Monik Scott MD   Hosp Day # 0 PCP Jay Sinha MD       Subjective:  Patient chest shira Before breakfast     • Nitroglycerin in D5W Stopped (02/10/21 2100)       Assessment:  • Atypical chest pain with elevated troponin--[facility] c/w Takotsubo, nonocclusive CAD.   • HTN urgency-improved   • ?  Esophagitis  • HL-statin  • Carotid disease  • anxiety

## 2021-02-11 NOTE — PROGRESS NOTES
[facility] Gastroenterology Progress Note    CC: Dysphagia    S: Pt with no swallowing issues at this time. Otherwise feels well.      O: /63 (BP Location: Left arm)   Pulse 74   Temp 98.5 °F (36.9 °C) (Oral)   Resp 18   Ht 5' 2\" (1.575 m)   Wt any questions or concerns.        [facility], 02/11/21, 3:53 PM  [facility] Gastroenterology

## 2021-02-12 VITALS
BODY MASS INDEX: 31.58 KG/M2 | RESPIRATION RATE: 18 BRPM | TEMPERATURE: 98 F | WEIGHT: 171.63 LBS | OXYGEN SATURATION: 95 % | DIASTOLIC BLOOD PRESSURE: 76 MMHG | HEIGHT: 62 IN | HEART RATE: 78 BPM | SYSTOLIC BLOOD PRESSURE: 134 MMHG

## 2021-02-12 LAB
ANION GAP SERPL CALC-SCNC: 6 MMOL/L (ref 0–18)
BUN BLD-MCNC: 18 MG/DL (ref 7–18)
BUN/CREAT SERPL: 21.2 (ref 10–20)
CALCIUM BLD-MCNC: 8.7 MG/DL (ref 8.5–10.1)
CHLORIDE SERPL-SCNC: 108 MMOL/L (ref 98–112)
CO2 SERPL-SCNC: 27 MMOL/L (ref 21–32)
CREAT BLD-MCNC: 0.85 MG/DL
GLUCOSE BLD-MCNC: 115 MG/DL (ref 70–99)
GLUCOSE BLD-MCNC: 122 MG/DL (ref 70–99)
GLUCOSE BLD-MCNC: 132 MG/DL (ref 70–99)
GLUCOSE BLD-MCNC: 182 MG/DL (ref 70–99)
OSMOLALITY SERPL CALC.SUM OF ELEC: 295 MOSM/KG (ref 275–295)
PLATELET # BLD AUTO: 168 10(3)UL (ref 150–450)
POTASSIUM SERPL-SCNC: 4.3 MMOL/L (ref 3.5–5.1)
SODIUM SERPL-SCNC: 141 MMOL/L (ref 136–145)

## 2021-02-12 PROCEDURE — 99217 OBSERVATION CARE DISCHARGE: CPT | Performed by: INTERNAL MEDICINE

## 2021-02-12 PROCEDURE — 99214 OFFICE O/P EST MOD 30 MIN: CPT | Performed by: INTERNAL MEDICINE

## 2021-02-12 RX ORDER — PANTOPRAZOLE SODIUM 40 MG/1
40 TABLET, DELAYED RELEASE ORAL
Qty: 60 TABLET | Refills: 1 | Status: SHIPPED | OUTPATIENT
Start: 2021-02-12 | End: 2021-03-14

## 2021-02-12 RX ORDER — BLOOD SUGAR DIAGNOSTIC
STRIP MISCELLANEOUS
Qty: 50 STRIP | Refills: 6 | Status: SHIPPED | OUTPATIENT
Start: 2021-02-12 | End: 2021-02-16

## 2021-02-12 RX ORDER — FUROSEMIDE 20 MG/1
10 TABLET ORAL DAILY
Qty: 30 TABLET | Refills: 3 | Status: SHIPPED | OUTPATIENT
Start: 2021-02-13 | End: 2021-08-23

## 2021-02-12 RX ORDER — ONDANSETRON 4 MG/1
4 TABLET, ORALLY DISINTEGRATING ORAL EVERY 8 HOURS PRN
Qty: 30 TABLET | Refills: 0 | Status: SHIPPED | OUTPATIENT
Start: 2021-02-12 | End: 2021-08-23 | Stop reason: ALTCHOICE

## 2021-02-12 RX ORDER — ISOSORBIDE MONONITRATE 30 MG/1
15 TABLET, EXTENDED RELEASE ORAL DAILY
Qty: 30 TABLET | Refills: 3 | Status: SHIPPED | OUTPATIENT
Start: 2021-02-12 | End: 2021-05-18

## 2021-02-12 RX ORDER — CARVEDILOL 6.25 MG/1
6.25 TABLET ORAL 2 TIMES DAILY
Qty: 60 TABLET | Refills: 3 | Status: SHIPPED | OUTPATIENT
Start: 2021-02-12

## 2021-02-12 RX ORDER — LOSARTAN POTASSIUM 50 MG/1
25 TABLET ORAL DAILY
Refills: 0 | Status: SHIPPED | COMMUNITY
Start: 2021-02-12

## 2021-02-12 RX ORDER — ISOSORBIDE MONONITRATE 30 MG/1
15 TABLET, EXTENDED RELEASE ORAL DAILY
Status: DISCONTINUED | OUTPATIENT
Start: 2021-02-12 | End: 2021-02-12

## 2021-02-12 RX ORDER — BLOOD-GLUCOSE METER
EACH MISCELLANEOUS
Qty: 1 KIT | Refills: 0 | Status: SHIPPED | OUTPATIENT
Start: 2021-02-12 | End: 2021-02-16

## 2021-02-12 NOTE — PROGRESS NOTES
NURSING DISCHARGE NOTE    Discharged Home via Wheelchair. Accompanied by Family member  Belongings Taken by patient/family. Tele dc'd. IV removed. Catheter intact. Discharge instructions completed. Pt verbalized understanding.

## 2021-02-12 NOTE — PLAN OF CARE
Received patient, alert and oriented. Denied chest pain, denied SOB. Up with steady gait. Voiding without difficulty. DIscussed POC. Due meds given. Patient watched some of the diabetic video. Safety measures reinforced, call light within reach.  Bed alarm range  Description: INTERVENTIONS:  - Monitor Blood Glucose as ordered  - Assess for signs and symptoms of hyperglycemia and hypoglycemia  - Administer ordered medications to maintain glucose within target range  - Assess barriers to adequate nutritional i

## 2021-02-12 NOTE — PROGRESS NOTES
[facility]  Cardiology Progress Note    Lara Farrar Patient Status:  Observation    1940 MRN [id number]   [facility] 8NE-A Attending Hoang Landa MD   Hosp Day # 0 PCP Gloria Nuñez MD       Subjective:  Up in chair.  Main losartan Potassium  50 mg Oral Daily   • Levothyroxine Sodium  50 mcg Oral Before breakfast         Assessment:  • Atypical chest pain with elevated troponin--[facility] c/w Takotsubo, nonocclusive CAD.   • HTN urgency-improved   • ?  Esophagitis-seen by GI  • HL-

## 2021-02-12 NOTE — DISCHARGE SUMMARY
[facility] HOSPITALIST  DISCHARGE SUMMARY     Carol Level Patient Status:  Observation    1940 MRN [id number]   [facility] 8NE-A Attending No att. providers found   [facility] Day # 0 PCP Usama Thompson MD     Date of Admission: 2021 0.5 tablets (10 mg total) by mouth daily. Quantity: 30 tablet  Refills: 3     glipiZIDE 5 MG Tabs  Commonly known as: GLUCOTROL      Take 1 tablet (5 mg total) by mouth 2 (two) times daily before meals.    Quantity: 60 tablet  Refills: 1     Isosorbide Mo mouth daily. Refills: 0     CoQ10 100 MG Caps      Take 1 tablet by mouth daily. Refills: 0     GLUCOSAMINE & FISH OIL OR      Take 1 capsule by mouth daily.    Refills: 0     Levothyroxine Sodium 50 MCG Tabs  Commonly known as: SYNTHROID      Take 50 m diabetes    [place] [id number]  Schedule an appointment as soon as possible for a visit      Appointments for Next 30 Days 2/14/2021 - 3/16/2021      Date Arrival Time Visit COVID-19 Vaccine Consent Form – Kinyarwanda version  [email or web address]. pdf     COVID-19 Vaccine Consent Form for Employees of [employer]  ht

## 2021-02-12 NOTE — CARDIAC REHAB
Patient referred to outpatient cardiac rehabilitation either here at [facility] or [facility] at [phone].

## 2021-02-13 ENCOUNTER — PATIENT MESSAGE (OUTPATIENT)
Dept: FAMILY MEDICINE CLINIC | Facility: CLINIC | Age: 81
End: 2021-02-13

## 2021-02-13 DIAGNOSIS — Z23 NEED FOR VACCINATION: ICD-10-CM

## 2021-02-14 RX ORDER — GLIPIZIDE 5 MG/1
5 TABLET ORAL
Qty: 60 TABLET | Refills: 1 | Status: SHIPPED | OUTPATIENT
Start: 2021-02-14 | End: 2021-05-18

## 2021-02-14 NOTE — TELEPHONE ENCOUNTER
From: Worthy Pointer  To: Elizabeth Lara MD  Sent: 2/13/2021 4:50 PM CST  Subject: Other    Dr. Phoenix Tomlinson, is it ok to go for my COVID vaccine on Monday the 15th, even though was in the hospital from [facility] for Takotsubo Cardiomyopathy, this past we

## 2021-02-15 ENCOUNTER — PATIENT OUTREACH (OUTPATIENT)
Dept: CASE MANAGEMENT | Age: 81
End: 2021-02-15

## 2021-02-15 ENCOUNTER — IMMUNIZATION (OUTPATIENT)
Dept: LAB | Age: 81
End: 2021-02-15
Attending: HOSPITALIST
Payer: MEDICARE

## 2021-02-15 DIAGNOSIS — Z23 NEED FOR VACCINATION: Primary | ICD-10-CM

## 2021-02-15 DIAGNOSIS — Z02.9 ENCOUNTERS FOR UNSPECIFIED ADMINISTRATIVE PURPOSE: ICD-10-CM

## 2021-02-15 PROCEDURE — 0001A SARSCOV2 VAC 30MCG/0.3ML IM: CPT

## 2021-02-15 PROCEDURE — 1111F DSCHRG MED/CURRENT MED MERGE: CPT

## 2021-02-15 NOTE — PROGRESS NOTES
Attempted to contact pt or pt's dtr Jeannie Osman for TCM however no answer. Call continued to ring and then sounded as though someone answer however no response. NCM to try again at a later time.

## 2021-02-16 ENCOUNTER — TELEPHONE (OUTPATIENT)
Dept: FAMILY MEDICINE CLINIC | Facility: CLINIC | Age: 81
End: 2021-02-16

## 2021-02-16 ENCOUNTER — TELEPHONE (OUTPATIENT)
Dept: CARDIAC REHAB | Facility: HOSPITAL | Age: 81
End: 2021-02-16

## 2021-02-16 RX ORDER — BLOOD-GLUCOSE METER
EACH MISCELLANEOUS
Qty: 1 KIT | Refills: 0 | Status: SHIPPED | OUTPATIENT
Start: 2021-02-16

## 2021-02-16 RX ORDER — BLOOD SUGAR DIAGNOSTIC
STRIP MISCELLANEOUS
Qty: 100 STRIP | Refills: 3 | Status: SHIPPED | OUTPATIENT
Start: 2021-02-16

## 2021-02-16 RX ORDER — LANCETS 33 GAUGE
1 EACH MISCELLANEOUS
Qty: 100 EACH | Refills: 3 | Status: SHIPPED | OUTPATIENT
Start: 2021-02-16

## 2021-02-16 NOTE — TELEPHONE ENCOUNTER
Spoke to pt for TCM today. Pt does not have HFU appt scheduled at this time, no available appts. TCM/HFU appt recommended by 2/21/2021 as pt is a high risk for readmission. Please discuss with PCP and follow up with patient accordingly. Thank you.      B

## 2021-02-16 NOTE — TELEPHONE ENCOUNTER
PSR: Please contact pt to schedule Virtual HFU appt with Dr. Raquel Harvey 103 on Friday. Thank you. If pt asks, we are working on her Diabetic testing supplies (see other TE).

## 2021-02-16 NOTE — PROGRESS NOTES
Initial Post Discharge Follow Up   Discharge Date: 2/12/21  Contact Date: 2/15/2021    Consent Verification:  Assessment Completed With: Patient  HIPAA Verified?   Yes    Discharge Dx:   NSTEMI     Was TCC ordered: yes, but patient declined wanting to see (40 mg total) by mouth 2 (two) times daily before meals.  60 tablet 1   • Blood Glucose Monitoring Suppl (ONETOUCH VERIO FLEX SYSTEM) w/Device Does not apply Kit Check glucose twice daily 1 kit 0   • Glucose Blood (ONETOUCH VERIO) In Vitro Strip Check gluco Health/Services ordered at D/C? No    DME ordered at D/C? No    DX: specifics:  Have you been experiencing any symptoms since you returned home from the hospital?       No  Any shortness of breath?   no  Did you have a procedure (cardiac cath or angiogram) your appointment. COVID-19 Vaccine Consent Form  [email or web address] pdf     COVID-19 Vaccine Consent Form – Thai version  Senait 141/80 p62, 148/ 84p- 65. Med review completed. Pt states that she was unable to get the DM supplies (see below). Patient aware that she will receive a return call to schedule HFU with PCP.      NCM called [facility] to speak with Pharmacist Deidre Walker ( [phone]

## 2021-02-17 ENCOUNTER — TELEPHONE (OUTPATIENT)
Dept: NEUROLOGY | Facility: CLINIC | Age: 81
End: 2021-02-17

## 2021-02-17 ENCOUNTER — TELEPHONE (OUTPATIENT)
Dept: CARDIOLOGY | Age: 81
End: 2021-02-17

## 2021-02-17 ENCOUNTER — TELEPHONE (OUTPATIENT)
Dept: FAMILY MEDICINE CLINIC | Facility: CLINIC | Age: 81
End: 2021-02-17

## 2021-02-17 NOTE — TELEPHONE ENCOUNTER
[facility] called from [address] and stated in order for Medicare to cover glucose meter they would need office notes within the last 6 months stating patient has elevated blood sugars.  Last office visit is 10/15/20 TCM, would this visit be efficient enough normal...

## 2021-02-17 NOTE — TELEPHONE ENCOUNTER
Pt's daughter requesting that video visit be moved up. Provider has opening 2/19 at 1110. They accepted. Info given to ELISHA BURCIAGA [facility].

## 2021-02-17 NOTE — TELEPHONE ENCOUNTER
Per PSR:    pt's daughter, Selena Perez, is requesting an earlier appt b/c hospital wanted her seen a week after discharging her    Per discharge disposition no mention of needing to see neurology within a week but will route to provider for advisement.

## 2021-02-17 NOTE — TELEPHONE ENCOUNTER
, patient has appt w/ you this Friday 2/19. Please document new DM diagnosis so that her insurance will pay ffor her glucometer, thank you!

## 2021-02-17 NOTE — TELEPHONE ENCOUNTER
Latonia Menjivar MD  You; Jay Fernandez Nurse 3 minutes ago (2:18 PM)     Earlier we received a my chart message 2/14 and I asked the patient to do a video visit with me. I last saw her in Nov 2020.  Thanks

## 2021-02-17 NOTE — TELEPHONE ENCOUNTER
More documentation can be provided in OV notes when pt sees Dr. Hubert Barber on 2/19/21 in order to send to [address]

## 2021-02-17 NOTE — TELEPHONE ENCOUNTER
Spoke to pharmacist and explained we have no documentation regarding DM as this was just diagnosed at her recent hospitalization. Pt does have an appt w/  this Friday 2/19.  Discussed w/ pharmacist that we will fax that note once it is available

## 2021-02-19 ENCOUNTER — TELEMEDICINE (OUTPATIENT)
Dept: FAMILY MEDICINE CLINIC | Facility: CLINIC | Age: 81
End: 2021-02-19

## 2021-02-19 ENCOUNTER — OFFICE VISIT (OUTPATIENT)
Dept: CARDIOLOGY | Age: 81
End: 2021-02-19

## 2021-02-19 ENCOUNTER — APPOINTMENT (OUTPATIENT)
Dept: CARDIOLOGY | Age: 81
End: 2021-02-19

## 2021-02-19 ENCOUNTER — TELEMEDICINE (OUTPATIENT)
Dept: NEUROLOGY | Facility: CLINIC | Age: 81
End: 2021-02-19

## 2021-02-19 VITALS
WEIGHT: 171 LBS | HEART RATE: 59 BPM | BODY MASS INDEX: 31.47 KG/M2 | SYSTOLIC BLOOD PRESSURE: 159 MMHG | HEIGHT: 62 IN | DIASTOLIC BLOOD PRESSURE: 85 MMHG

## 2021-02-19 DIAGNOSIS — F41.9 ANXIETY: ICD-10-CM

## 2021-02-19 DIAGNOSIS — I65.23 ASYMPTOMATIC CAROTID ARTERY STENOSIS, BILATERAL: Primary | ICD-10-CM

## 2021-02-19 DIAGNOSIS — R73.9 HYPERGLYCEMIA: ICD-10-CM

## 2021-02-19 DIAGNOSIS — I10 HYPERTENSION, BENIGN: ICD-10-CM

## 2021-02-19 DIAGNOSIS — I21.4 NSTEMI (NON-ST ELEVATED MYOCARDIAL INFARCTION) (HCC): Primary | ICD-10-CM

## 2021-02-19 DIAGNOSIS — Z82.49 FAMILY HISTORY OF CORONARY ARTERIOSCLEROSIS: ICD-10-CM

## 2021-02-19 DIAGNOSIS — I50.22 CHRONIC SYSTOLIC HEART FAILURE (CMD): ICD-10-CM

## 2021-02-19 DIAGNOSIS — R42 DIZZINESS: Primary | ICD-10-CM

## 2021-02-19 DIAGNOSIS — E78.00 PURE HYPERCHOLESTEROLEMIA: ICD-10-CM

## 2021-02-19 PROCEDURE — 99213 OFFICE O/P EST LOW 20 MIN: CPT | Performed by: OTHER

## 2021-02-19 PROCEDURE — 99214 OFFICE O/P EST MOD 30 MIN: CPT | Performed by: NURSE PRACTITIONER

## 2021-02-19 PROCEDURE — 99496 TRANSJ CARE MGMT HIGH F2F 7D: CPT | Performed by: FAMILY MEDICINE

## 2021-02-19 RX ORDER — BUSPIRONE HYDROCHLORIDE 5 MG/1
5 TABLET ORAL 2 TIMES DAILY
Qty: 60 TABLET | Refills: 2 | Status: SHIPPED | OUTPATIENT
Start: 2021-02-19 | End: 2021-05-24

## 2021-02-19 RX ORDER — FUROSEMIDE 20 MG/1
10 TABLET ORAL DAILY
COMMUNITY
Start: 2021-02-13 | End: 2021-06-04 | Stop reason: CLARIF

## 2021-02-19 RX ORDER — SPIRONOLACTONE 25 MG/1
12.5 TABLET ORAL DAILY
Qty: 45 TABLET | Refills: 3 | Status: SHIPPED | OUTPATIENT
Start: 2021-02-19

## 2021-02-19 RX ORDER — ISOSORBIDE MONONITRATE 30 MG/1
15 TABLET, EXTENDED RELEASE ORAL DAILY
COMMUNITY
Start: 2021-02-12 | End: 2021-03-29 | Stop reason: ALTCHOICE

## 2021-02-19 RX ORDER — PANTOPRAZOLE SODIUM 40 MG/1
40 TABLET, DELAYED RELEASE ORAL 2 TIMES DAILY
COMMUNITY
Start: 2021-02-12 | End: 2021-03-14

## 2021-02-19 ASSESSMENT — PATIENT HEALTH QUESTIONNAIRE - PHQ9
CLINICAL INTERPRETATION OF PHQ2 SCORE: NO FURTHER SCREENING NEEDED
2. FEELING DOWN, DEPRESSED OR HOPELESS: NOT AT ALL
1. LITTLE INTEREST OR PLEASURE IN DOING THINGS: NOT AT ALL
CLINICAL INTERPRETATION OF PHQ9 SCORE: NO FURTHER SCREENING NEEDED
SUM OF ALL RESPONSES TO PHQ9 QUESTIONS 1 AND 2: 0
SUM OF ALL RESPONSES TO PHQ9 QUESTIONS 1 AND 2: 0

## 2021-02-19 NOTE — PROGRESS NOTES
Verner Monas Zwick verbally consents to a Virtual/Video Check-In service on 02/19/21. Time spent:30 min. HPI:    Angelika Baez is a 80year old female here today for hospital follow up.    She was discharged from Inpatient hospital, [facility] to pt lots lots of close people recently. Allergies:  She is allergic to compazine and compazine.     Current Meds:    •  Blood Glucose Monitoring Suppl (ONETOUCH VERIO FLEX SYSTEM) w/Device Does not apply Kit, Onetouch Verio Flex System to Check glucose da Take 1 capsule by mouth daily. •  vitamin E 400 UNITS Oral Cap, Take 400 Units by mouth daily. No current facility-administered medications on file prior to visit.          HISTORY: reconciled and reviewed with patient  She  has a past medical his normal range of motion of extremities  NEURO: denies headaches, denies dizziness, denies weakness  PSYCHE: denies depression or anxiety  HEMATOLOGIC: denies hx of anemia, or bruising, denies bleeding  ENDOCRINE: denies thyroid history  ALL/ASTHMA: denies h severe    Patient seen within 7 days from date of discharge.      Gloria Nuñez MD, 2/19/2021

## 2021-02-19 NOTE — PROGRESS NOTES
HPI:    Patient ID: David Bear is a 80year old female. This visit is conducted using Telemedicine with live, interactive video and audio.     Patient has been referred to the [facility] website at [email or web address] to review the yearly Consent to Tr SITE RIGHT (TFT=84327)  2007    DCIS   • NM PARATHYROID SURGERY  (CPT=78070)  2008   • OTHER  1/1/06    lithotripsy   • OTHER      parathyroid resection   • RADIATION RIGHT     • VAGINIAL HYSTERECTOMY, VAGINAL VAULT SUSPENSION, AP REPAIR, TVT, CYSTO N/A 4/ tablet 1   • losartan Potassium 50 MG Oral Tab Take 1 tablet (50 mg total) by mouth daily. 0   • carvedilol 6.25 MG Oral Tab Take 1 tablet (6.25 mg total) by mouth 2 (two) times daily.  60 tablet 3   • furosemide 20 MG Oral Tab Take 0.5 tablets (10 mg tota tested            ASSESSMENT/PLAN:   Dizziness  (primary encounter diagnosis)  Anxiety    MRI brain reviewed. She will benefit from SSRI or Buspar for anxiety  Patient would like to start low dose Buspar.    Advised caution with Xanax  Management of Takotsu

## 2021-02-23 NOTE — TELEPHONE ENCOUNTER
Pt's insurance will not cover DM test supplies with only a diagnosis of Hyperglycemia in LOV 2/19 notes.     [facility] is requesting copies of OV notes but it needs DM diagnosis on it if pt has DM.    2/10/21  8:30 AM   HgbA1C   <5.7 % 7.3High

## 2021-02-25 ENCOUNTER — LAB ENCOUNTER (OUTPATIENT)
Dept: LAB | Age: 81
End: 2021-02-25
Attending: FAMILY MEDICINE
Payer: MEDICARE

## 2021-02-25 DIAGNOSIS — E78.00 PURE HYPERCHOLESTEROLEMIA: ICD-10-CM

## 2021-02-25 DIAGNOSIS — E03.9 ACQUIRED HYPOTHYROIDISM: ICD-10-CM

## 2021-02-25 DIAGNOSIS — R73.9 HYPERGLYCEMIA: ICD-10-CM

## 2021-02-25 DIAGNOSIS — E11.65 TYPE 2 DIABETES MELLITUS WITH HYPERGLYCEMIA, WITHOUT LONG-TERM CURRENT USE OF INSULIN (HCC): ICD-10-CM

## 2021-02-25 DIAGNOSIS — I10 ESSENTIAL HYPERTENSION, BENIGN: ICD-10-CM

## 2021-02-25 DIAGNOSIS — E55.9 VITAMIN D DEFICIENCY: ICD-10-CM

## 2021-02-25 LAB
ALBUMIN SERPL-MCNC: 3.5 G/DL (ref 3.4–5)
ALBUMIN/GLOB SERPL: 1.1 {RATIO} (ref 1–2)
ALP LIVER SERPL-CCNC: 73 U/L
ALT SERPL-CCNC: 19 U/L
ANION GAP SERPL CALC-SCNC: 4 MMOL/L (ref 0–18)
AST SERPL-CCNC: 17 U/L (ref 15–37)
BASOPHILS # BLD AUTO: 0.04 X10(3) UL (ref 0–0.2)
BASOPHILS NFR BLD AUTO: 0.9 %
BILIRUB SERPL-MCNC: 0.7 MG/DL (ref 0.1–2)
BUN BLD-MCNC: 11 MG/DL (ref 7–18)
BUN/CREAT SERPL: 10.5 (ref 10–20)
CALCIUM BLD-MCNC: 9.4 MG/DL (ref 8.5–10.1)
CHLORIDE SERPL-SCNC: 110 MMOL/L (ref 98–112)
CHOLEST SMN-MCNC: 167 MG/DL (ref ?–200)
CO2 SERPL-SCNC: 29 MMOL/L (ref 21–32)
CREAT BLD-MCNC: 1.05 MG/DL
DEPRECATED RDW RBC AUTO: 44.2 FL (ref 35.1–46.3)
EOSINOPHIL # BLD AUTO: 0.19 X10(3) UL (ref 0–0.7)
EOSINOPHIL NFR BLD AUTO: 4.3 %
ERYTHROCYTE [DISTWIDTH] IN BLOOD BY AUTOMATED COUNT: 12.9 % (ref 11–15)
GLOBULIN PLAS-MCNC: 3.3 G/DL (ref 2.8–4.4)
GLUCOSE BLD-MCNC: 121 MG/DL (ref 70–99)
HCT VFR BLD AUTO: 38.4 %
HDLC SERPL-MCNC: 65 MG/DL (ref 40–59)
HGB BLD-MCNC: 12.3 G/DL
IMM GRANULOCYTES # BLD AUTO: 0.01 X10(3) UL (ref 0–1)
IMM GRANULOCYTES NFR BLD: 0.2 %
LDLC SERPL CALC-MCNC: 79 MG/DL (ref ?–100)
LYMPHOCYTES # BLD AUTO: 1.28 X10(3) UL (ref 1–4)
LYMPHOCYTES NFR BLD AUTO: 28.9 %
M PROTEIN MFR SERPL ELPH: 6.8 G/DL (ref 6.4–8.2)
MCH RBC QN AUTO: 30.1 PG (ref 26–34)
MCHC RBC AUTO-ENTMCNC: 32 G/DL (ref 31–37)
MCV RBC AUTO: 93.9 FL
MONOCYTES # BLD AUTO: 0.52 X10(3) UL (ref 0.1–1)
MONOCYTES NFR BLD AUTO: 11.7 %
NEUTROPHILS # BLD AUTO: 2.39 X10 (3) UL (ref 1.5–7.7)
NEUTROPHILS # BLD AUTO: 2.39 X10(3) UL (ref 1.5–7.7)
NEUTROPHILS NFR BLD AUTO: 54 %
NONHDLC SERPL-MCNC: 102 MG/DL (ref ?–130)
OSMOLALITY SERPL CALC.SUM OF ELEC: 297 MOSM/KG (ref 275–295)
PLATELET # BLD AUTO: 215 10(3)UL (ref 150–450)
POTASSIUM SERPL-SCNC: 4.4 MMOL/L (ref 3.5–5.1)
RBC # BLD AUTO: 4.09 X10(6)UL
SODIUM SERPL-SCNC: 143 MMOL/L (ref 136–145)
TRIGL SERPL-MCNC: 115 MG/DL (ref 30–149)
TSI SER-ACNC: 2.9 MIU/ML (ref 0.36–3.74)
VIT D+METAB SERPL-MCNC: 26.5 NG/ML (ref 30–100)
VLDLC SERPL CALC-MCNC: 23 MG/DL (ref 0–30)
WBC # BLD AUTO: 4.4 X10(3) UL (ref 4–11)

## 2021-02-25 PROCEDURE — 83036 HEMOGLOBIN GLYCOSYLATED A1C: CPT

## 2021-02-25 PROCEDURE — 85025 COMPLETE CBC W/AUTO DIFF WBC: CPT

## 2021-02-25 PROCEDURE — 36415 COLL VENOUS BLD VENIPUNCTURE: CPT

## 2021-02-25 PROCEDURE — 80053 COMPREHEN METABOLIC PANEL: CPT

## 2021-02-25 PROCEDURE — 82306 VITAMIN D 25 HYDROXY: CPT

## 2021-02-25 PROCEDURE — 80061 LIPID PANEL: CPT

## 2021-02-25 PROCEDURE — 84443 ASSAY THYROID STIM HORMONE: CPT

## 2021-02-26 ENCOUNTER — OFFICE VISIT (OUTPATIENT)
Dept: CARDIOLOGY | Age: 81
End: 2021-02-26

## 2021-02-26 ENCOUNTER — TELEPHONE (OUTPATIENT)
Dept: FAMILY MEDICINE CLINIC | Facility: CLINIC | Age: 81
End: 2021-02-26

## 2021-02-26 VITALS
HEIGHT: 62 IN | DIASTOLIC BLOOD PRESSURE: 78 MMHG | HEART RATE: 65 BPM | BODY MASS INDEX: 31.47 KG/M2 | SYSTOLIC BLOOD PRESSURE: 124 MMHG | WEIGHT: 171 LBS

## 2021-02-26 DIAGNOSIS — I35.1 NONRHEUMATIC AORTIC (VALVE) INSUFFICIENCY: ICD-10-CM

## 2021-02-26 DIAGNOSIS — I51.81 TAKOTSUBO CARDIOMYOPATHY: ICD-10-CM

## 2021-02-26 DIAGNOSIS — I10 HYPERTENSION, BENIGN: ICD-10-CM

## 2021-02-26 DIAGNOSIS — R60.0 LOCALIZED EDEMA: ICD-10-CM

## 2021-02-26 DIAGNOSIS — Z82.49 FAMILY HISTORY OF CORONARY ARTERIOSCLEROSIS: ICD-10-CM

## 2021-02-26 DIAGNOSIS — E78.00 PURE HYPERCHOLESTEROLEMIA: ICD-10-CM

## 2021-02-26 DIAGNOSIS — I21.4 NON-ST ELEVATION MI (NSTEMI) (CMD): Primary | ICD-10-CM

## 2021-02-26 DIAGNOSIS — I65.23 ASYMPTOMATIC CAROTID ARTERY STENOSIS, BILATERAL: ICD-10-CM

## 2021-02-26 DIAGNOSIS — E11.65 TYPE 2 DIABETES MELLITUS WITH HYPERGLYCEMIA, WITHOUT LONG-TERM CURRENT USE OF INSULIN (HCC): Primary | ICD-10-CM

## 2021-02-26 PROBLEM — R07.2 PRECORDIAL PAIN: Status: RESOLVED | Noted: 2020-10-14 | Resolved: 2021-02-26

## 2021-02-26 LAB
EST. AVERAGE GLUCOSE BLD GHB EST-MCNC: 163 MG/DL (ref 68–126)
HBA1C MFR BLD HPLC: 7.3 % (ref ?–5.7)

## 2021-02-26 PROCEDURE — 99215 OFFICE O/P EST HI 40 MIN: CPT | Performed by: INTERNAL MEDICINE

## 2021-02-26 RX ORDER — BUSPIRONE HYDROCHLORIDE 5 MG/1
5 TABLET ORAL 2 TIMES DAILY
COMMUNITY
Start: 2021-02-19 | End: 2021-06-01 | Stop reason: CLARIF

## 2021-02-26 ASSESSMENT — PATIENT HEALTH QUESTIONNAIRE - PHQ9
CLINICAL INTERPRETATION OF PHQ2 SCORE: NO FURTHER SCREENING NEEDED
2. FEELING DOWN, DEPRESSED OR HOPELESS: NOT AT ALL
SUM OF ALL RESPONSES TO PHQ9 QUESTIONS 1 AND 2: 0
1. LITTLE INTEREST OR PLEASURE IN DOING THINGS: NOT AT ALL
SUM OF ALL RESPONSES TO PHQ9 QUESTIONS 1 AND 2: 0
CLINICAL INTERPRETATION OF PHQ9 SCORE: NO FURTHER SCREENING NEEDED

## 2021-02-26 ASSESSMENT — ENCOUNTER SYMPTOMS
ABDOMINAL PAIN: 0
NEAR-SYNCOPE: 0
FEVER: 0
ORTHOPNEA: 0
NIGHT SWEATS: 0
BLOATING: 0
SYNCOPE: 0
SHORTNESS OF BREATH: 0
COUGH: 0

## 2021-02-26 NOTE — TELEPHONE ENCOUNTER
----- Message from Usama Thompson MD sent at 2/26/2021 12:15 PM CST -----  Overall good, ok to take vit. D 2000U a day. Add hgbA1C., glucose borderline.

## 2021-02-28 ASSESSMENT — ENCOUNTER SYMPTOMS
FEVER: 0
HEMOPTYSIS: 0
BRUISES/BLEEDS EASILY: 0
CHILLS: 0
SUSPICIOUS LESIONS: 0
ALLERGIC/IMMUNOLOGIC COMMENTS: NO NEW FOOD ALLERGIES
WEIGHT LOSS: 0
WEIGHT GAIN: 0
COUGH: 0
HEMATOCHEZIA: 0

## 2021-03-01 ENCOUNTER — TELEPHONE (OUTPATIENT)
Dept: FAMILY MEDICINE CLINIC | Facility: CLINIC | Age: 81
End: 2021-03-01

## 2021-03-01 DIAGNOSIS — E11.65 TYPE 2 DIABETES MELLITUS WITH HYPERGLYCEMIA, WITHOUT LONG-TERM CURRENT USE OF INSULIN (HCC): Primary | ICD-10-CM

## 2021-03-01 NOTE — TELEPHONE ENCOUNTER
Notified the patient of the below lab results via [address] See TE dated 3/1/2021 regarding HA1C.

## 2021-03-01 NOTE — TELEPHONE ENCOUNTER
----- Message from Janet Olmos MD sent at 3/1/2021  9:49 AM CST -----  KAMERON ok to see Neli Teran for education.

## 2021-03-05 ENCOUNTER — E-ADVICE (OUTPATIENT)
Dept: CARDIOLOGY | Age: 81
End: 2021-03-05

## 2021-03-08 ENCOUNTER — IMMUNIZATION (OUTPATIENT)
Dept: LAB | Age: 81
End: 2021-03-08
Attending: HOSPITALIST
Payer: MEDICARE

## 2021-03-08 DIAGNOSIS — Z23 NEED FOR VACCINATION: Primary | ICD-10-CM

## 2021-03-08 PROCEDURE — 0002A SARSCOV2 VAC 30MCG/0.3ML IM: CPT

## 2021-03-11 ENCOUNTER — TELEPHONE (OUTPATIENT)
Dept: CARDIOLOGY | Age: 81
End: 2021-03-11

## 2021-03-11 RX ORDER — LOSARTAN POTASSIUM 50 MG/1
25 TABLET ORAL DAILY
Qty: 90 TABLET | Refills: 3 | Status: SHIPPED | OUTPATIENT
Start: 2021-03-11

## 2021-03-27 RX ORDER — BUSPIRONE HYDROCHLORIDE 5 MG/1
5 TABLET ORAL 2 TIMES DAILY
Qty: 60 TABLET | Refills: 2 | OUTPATIENT
Start: 2021-03-27

## 2021-03-29 ENCOUNTER — OFFICE VISIT (OUTPATIENT)
Dept: CARDIOLOGY | Age: 81
End: 2021-03-29

## 2021-03-29 ENCOUNTER — LAB ENCOUNTER (OUTPATIENT)
Dept: LAB | Facility: HOSPITAL | Age: 81
End: 2021-03-29
Attending: NURSE PRACTITIONER
Payer: MEDICARE

## 2021-03-29 VITALS
HEART RATE: 68 BPM | DIASTOLIC BLOOD PRESSURE: 64 MMHG | WEIGHT: 169 LBS | BODY MASS INDEX: 31.1 KG/M2 | SYSTOLIC BLOOD PRESSURE: 116 MMHG | HEIGHT: 62 IN

## 2021-03-29 DIAGNOSIS — I51.81 TAKOTSUBO CARDIOMYOPATHY: Primary | ICD-10-CM

## 2021-03-29 DIAGNOSIS — I10 HYPERTENSION, BENIGN: ICD-10-CM

## 2021-03-29 DIAGNOSIS — I51.81 TAKOTSUBO SYNDROME: Primary | ICD-10-CM

## 2021-03-29 PROCEDURE — 80048 BASIC METABOLIC PNL TOTAL CA: CPT

## 2021-03-29 PROCEDURE — 36415 COLL VENOUS BLD VENIPUNCTURE: CPT

## 2021-03-29 PROCEDURE — 99213 OFFICE O/P EST LOW 20 MIN: CPT | Performed by: NURSE PRACTITIONER

## 2021-03-29 ASSESSMENT — PATIENT HEALTH QUESTIONNAIRE - PHQ9
CLINICAL INTERPRETATION OF PHQ2 SCORE: NO FURTHER SCREENING NEEDED
2. FEELING DOWN, DEPRESSED OR HOPELESS: NOT AT ALL
1. LITTLE INTEREST OR PLEASURE IN DOING THINGS: NOT AT ALL
SUM OF ALL RESPONSES TO PHQ9 QUESTIONS 1 AND 2: 0
CLINICAL INTERPRETATION OF PHQ9 SCORE: NO FURTHER SCREENING NEEDED
SUM OF ALL RESPONSES TO PHQ9 QUESTIONS 1 AND 2: 0

## 2021-03-29 ASSESSMENT — ENCOUNTER SYMPTOMS
DIAPHORESIS: 0
WEIGHT GAIN: 0
GASTROINTESTINAL NEGATIVE: 1
WEIGHT LOSS: 0
DECREASED APPETITE: 0
SHORTNESS OF BREATH: 0
NEUROLOGICAL NEGATIVE: 1
SYNCOPE: 0

## 2021-04-04 NOTE — TELEPHONE ENCOUNTER
Hospitalist ordered Accu-checks BID, Medicare will only cover every day since pt is not on Insulin. A1C 7.3. OK to change accu-chek to every day or do you still want it BID?
No once a day is fine
Pt needs new DM testing supplies, & Rx from Hospitalists can not be filled d/t Medicare requirements needed on Rx.   OK to resend Rx for DM supplies for pt?
S/w patient for TCM. She states that pharmacy was unable to dispense the diabetic testing kit and supplies due to incomplete documentation on Hospitalist part.  [facility] contacted Denny Pierre ([phone]) and she stated that she has tried multiple times a
Updated DM test supplies re-sent to [facility].
04-Apr-2021 17:24

## 2021-04-12 ENCOUNTER — TELEPHONE (OUTPATIENT)
Dept: ENDOCRINOLOGY CLINIC | Facility: CLINIC | Age: 81
End: 2021-04-12

## 2021-04-15 ENCOUNTER — OFFICE VISIT (OUTPATIENT)
Dept: CARDIOLOGY | Age: 81
End: 2021-04-15

## 2021-04-15 VITALS
BODY MASS INDEX: 31.28 KG/M2 | DIASTOLIC BLOOD PRESSURE: 74 MMHG | SYSTOLIC BLOOD PRESSURE: 116 MMHG | HEART RATE: 68 BPM | HEIGHT: 62 IN | WEIGHT: 170 LBS

## 2021-04-15 DIAGNOSIS — I21.4 NON-ST ELEVATION MI (NSTEMI) (CMD): ICD-10-CM

## 2021-04-15 DIAGNOSIS — I10 HYPERTENSION, BENIGN: Primary | ICD-10-CM

## 2021-04-15 DIAGNOSIS — I51.81 TAKOTSUBO CARDIOMYOPATHY: ICD-10-CM

## 2021-04-15 PROCEDURE — 99213 OFFICE O/P EST LOW 20 MIN: CPT | Performed by: NURSE PRACTITIONER

## 2021-04-15 RX ORDER — HYDROCHLOROTHIAZIDE 12.5 MG/1
TABLET ORAL DAILY
COMMUNITY
Start: 2021-04-01 | End: 2021-06-04 | Stop reason: CLARIF

## 2021-04-15 ASSESSMENT — ENCOUNTER SYMPTOMS
SHORTNESS OF BREATH: 0
WEIGHT LOSS: 0
SYNCOPE: 0
NEUROLOGICAL NEGATIVE: 1
GASTROINTESTINAL NEGATIVE: 1
DECREASED APPETITE: 0
DIAPHORESIS: 0
WEIGHT GAIN: 0

## 2021-04-15 ASSESSMENT — PATIENT HEALTH QUESTIONNAIRE - PHQ9
CLINICAL INTERPRETATION OF PHQ2 SCORE: NO FURTHER SCREENING NEEDED
2. FEELING DOWN, DEPRESSED OR HOPELESS: NOT AT ALL
CLINICAL INTERPRETATION OF PHQ9 SCORE: NO FURTHER SCREENING NEEDED
SUM OF ALL RESPONSES TO PHQ9 QUESTIONS 1 AND 2: 0
1. LITTLE INTEREST OR PLEASURE IN DOING THINGS: NOT AT ALL
2. FEELING DOWN, DEPRESSED OR HOPELESS: NOT AT ALL
1. LITTLE INTEREST OR PLEASURE IN DOING THINGS: NOT AT ALL
SUM OF ALL RESPONSES TO PHQ9 QUESTIONS 1 AND 2: 0
SUM OF ALL RESPONSES TO PHQ9 QUESTIONS 1 AND 2: 0
CLINICAL INTERPRETATION OF PHQ2 SCORE: NO FURTHER SCREENING NEEDED

## 2021-04-23 ENCOUNTER — ORDER TRANSCRIPTION (OUTPATIENT)
Dept: ADMINISTRATIVE | Facility: HOSPITAL | Age: 81
End: 2021-04-23

## 2021-04-23 DIAGNOSIS — Z11.59 ENCOUNTER FOR SCREENING FOR OTHER VIRAL DISEASES: ICD-10-CM

## 2021-04-23 DIAGNOSIS — Z01.818 PREOP EXAMINATION: Primary | ICD-10-CM

## 2021-05-18 ENCOUNTER — LAB ENCOUNTER (OUTPATIENT)
Dept: LAB | Age: 81
End: 2021-05-18
Attending: NURSE PRACTITIONER
Payer: MEDICARE

## 2021-05-18 ENCOUNTER — OFFICE VISIT (OUTPATIENT)
Dept: FAMILY MEDICINE CLINIC | Facility: CLINIC | Age: 81
End: 2021-05-18
Payer: MEDICARE

## 2021-05-18 VITALS
SYSTOLIC BLOOD PRESSURE: 150 MMHG | HEIGHT: 62 IN | HEART RATE: 62 BPM | RESPIRATION RATE: 18 BRPM | OXYGEN SATURATION: 96 % | TEMPERATURE: 97 F | DIASTOLIC BLOOD PRESSURE: 70 MMHG | BODY MASS INDEX: 31.17 KG/M2 | WEIGHT: 169.38 LBS

## 2021-05-18 DIAGNOSIS — E55.9 VITAMIN D DEFICIENCY: ICD-10-CM

## 2021-05-18 DIAGNOSIS — R53.1 WEAKNESS: ICD-10-CM

## 2021-05-18 DIAGNOSIS — F41.9 ANXIETY: ICD-10-CM

## 2021-05-18 DIAGNOSIS — I10 ESSENTIAL HYPERTENSION, BENIGN: Primary | ICD-10-CM

## 2021-05-18 DIAGNOSIS — H61.22 IMPACTED CERUMEN, LEFT EAR: ICD-10-CM

## 2021-05-18 PROCEDURE — 82607 VITAMIN B-12: CPT

## 2021-05-18 PROCEDURE — 87086 URINE CULTURE/COLONY COUNT: CPT | Performed by: NURSE PRACTITIONER

## 2021-05-18 PROCEDURE — 99214 OFFICE O/P EST MOD 30 MIN: CPT | Performed by: NURSE PRACTITIONER

## 2021-05-18 PROCEDURE — 80053 COMPREHEN METABOLIC PANEL: CPT

## 2021-05-18 PROCEDURE — 93000 ELECTROCARDIOGRAM COMPLETE: CPT | Performed by: NURSE PRACTITIONER

## 2021-05-18 PROCEDURE — 85025 COMPLETE CBC W/AUTO DIFF WBC: CPT

## 2021-05-18 PROCEDURE — 81003 URINALYSIS AUTO W/O SCOPE: CPT | Performed by: NURSE PRACTITIONER

## 2021-05-18 PROCEDURE — 82746 ASSAY OF FOLIC ACID SERUM: CPT

## 2021-05-18 PROCEDURE — 84443 ASSAY THYROID STIM HORMONE: CPT

## 2021-05-18 PROCEDURE — 36415 COLL VENOUS BLD VENIPUNCTURE: CPT

## 2021-05-18 PROCEDURE — 82306 VITAMIN D 25 HYDROXY: CPT

## 2021-05-18 NOTE — PROGRESS NOTES
Chief Complaint:   Patient presents with:  Weakness: C/o weakness x4 days. states she feels unsteady thinks it's vertigo. HPI:   This is a 80year old female presenting with her daughter Shaye Covarrubias for evaluation of 4 day history of weakness.  She's unable t • LUMPECTOMY RIGHT  2008    Invasive and DCIS   • MYRA BIOPSY STEREO NODULE 1 SITE RIGHT (CPT=19081)  2007    DCIS   • NM PARATHYROID SURGERY  (CPT=78070)  2008   • OTHER  1/1/06    lithotripsy   • OTHER      parathyroid resection   • OTHER SURGICAL HISTO total) by mouth 2 (two) times daily. 60 tablet 3   • furosemide 20 MG Oral Tab Take 0.5 tablets (10 mg total) by mouth daily.  30 tablet 3   • ondansetron (ZOFRAN ODT) 4 MG Oral Tablet Dispersible Take 1 tablet (4 mg total) by mouth every 8 (eight) hours as Positive for weakness. Negative for dizziness, syncope, speech difficulty, light-headedness, numbness and headaches. Psychiatric/Behavioral: The patient is nervous/anxious.        EXAM:   /70   Pulse 62   Temp 97.1 °F (36.2 °C) (Temporal)   Resp 18 Speech is not slurred. Behavior: Behavior normal. Behavior is cooperative. Thought Content: Thought content normal.        ASSESSMENT AND PLAN:   1. Essential hypertension, benign  -Continue current regimen per cardiology.   -BP borderline i

## 2021-05-18 NOTE — PATIENT INSTRUCTIONS
Weakness with Uncertain Cause  Based on your exam today, the exact cause of your weakness is not clear. But your weakness does not seem to be a sign of a serious illness at this time.  Keep an eye on your symptoms and get medical advice as instructed kaylen

## 2021-05-19 ENCOUNTER — TELEPHONE (OUTPATIENT)
Dept: FAMILY MEDICINE CLINIC | Facility: CLINIC | Age: 81
End: 2021-05-19

## 2021-05-19 DIAGNOSIS — E55.9 VITAMIN D DEFICIENCY: Primary | ICD-10-CM

## 2021-05-19 NOTE — TELEPHONE ENCOUNTER
----- Message from ADELE Gonsalez FNP-C sent at 5/19/2021  1:07 PM CDT -----  B12 is borderline low. Can try B12 injections: 1,000 mcg IM once monthly x 6 months. [facility] for nurse visit.     Vitamin D is lower than last lab draw even with OTC supplementatio

## 2021-05-19 NOTE — TELEPHONE ENCOUNTER
LVM for patient to call the office back tomorrow once the phones are back on to discuss lab results / instructions from provider below. Repeat VIT D ordered as directed by provider below.

## 2021-05-20 ENCOUNTER — NURSE ONLY (OUTPATIENT)
Dept: FAMILY MEDICINE CLINIC | Facility: CLINIC | Age: 81
End: 2021-05-20
Payer: MEDICARE

## 2021-05-20 DIAGNOSIS — E53.8 B12 DEFICIENCY: Primary | ICD-10-CM

## 2021-05-20 PROCEDURE — 96372 THER/PROPH/DIAG INJ SC/IM: CPT | Performed by: NURSE PRACTITIONER

## 2021-05-20 RX ORDER — CYANOCOBALAMIN 1000 UG/ML
1000 INJECTION INTRAMUSCULAR; SUBCUTANEOUS ONCE
Status: COMPLETED | OUTPATIENT
Start: 2021-05-20 | End: 2021-05-20

## 2021-05-20 RX ORDER — ERGOCALCIFEROL 1.25 MG/1
50000 CAPSULE ORAL WEEKLY
Qty: 8 CAPSULE | Refills: 0 | Status: SHIPPED | OUTPATIENT
Start: 2021-05-20 | End: 2021-07-15

## 2021-05-20 RX ADMIN — CYANOCOBALAMIN 1000 MCG: 1000 INJECTION INTRAMUSCULAR; SUBCUTANEOUS at 13:26:00

## 2021-05-20 NOTE — TELEPHONE ENCOUNTER
Spoke with the patient and the patient's daughter, Tika Odom, via phone. Notified them of the below lab results and orders. Patient voiced back to this nurse the medication directions and dosage for vitamin D.   Patient agrees to do monthly vitamin B12 injection

## 2021-05-21 ENCOUNTER — HOSPITAL ENCOUNTER (OUTPATIENT)
Dept: CV DIAGNOSTICS | Facility: HOSPITAL | Age: 81
Discharge: HOME | End: 2021-05-21
Attending: INTERNAL MEDICINE
Payer: MEDICARE

## 2021-05-21 DIAGNOSIS — R60.0 LOCALIZED EDEMA: ICD-10-CM

## 2021-05-21 DIAGNOSIS — I51.81 TAKOTSUBO CARDIOMYOPATHY: ICD-10-CM

## 2021-05-21 DIAGNOSIS — I21.4 NSTEMI (NON-ST ELEVATED MYOCARDIAL INFARCTION) (HCC): ICD-10-CM

## 2021-05-21 DIAGNOSIS — I10 BENIGN HYPERTENSION: ICD-10-CM

## 2021-05-21 DIAGNOSIS — I35.1 NONRHEUMATIC AORTIC VALVE INSUFFICIENCY: ICD-10-CM

## 2021-05-21 DIAGNOSIS — Z82.49 FAMILY HISTORY OF CORONARY ARTERY DISEASE: ICD-10-CM

## 2021-05-21 PROCEDURE — 93306 TTE W/DOPPLER COMPLETE: CPT | Performed by: INTERNAL MEDICINE

## 2021-05-22 DIAGNOSIS — R42 DIZZINESS: Primary | ICD-10-CM

## 2021-05-24 RX ORDER — BUSPIRONE HYDROCHLORIDE 5 MG/1
TABLET ORAL
Qty: 60 TABLET | Refills: 2 | Status: SHIPPED | OUTPATIENT
Start: 2021-05-24 | End: 2021-08-23

## 2021-05-24 NOTE — TELEPHONE ENCOUNTER
Medication: BUSPIRONE HCL 5 MG Oral Tab    Date of last refill: 02/19/2021 (#60/2)  [place]    Last office visit: 02/19/2021  Due back to clinic per last office note:  Around 05/19/2021  Date next office visit schedu

## 2021-05-25 VITALS
WEIGHT: 160 LBS | SYSTOLIC BLOOD PRESSURE: 171 MMHG | HEART RATE: 78 BPM | DIASTOLIC BLOOD PRESSURE: 94 MMHG | BODY MASS INDEX: 29.26 KG/M2 | TEMPERATURE: 97.7 F | OXYGEN SATURATION: 97 % | RESPIRATION RATE: 20 BRPM

## 2021-06-01 RX ORDER — LEVOTHYROXINE SODIUM 0.05 MG/1
TABLET ORAL
COMMUNITY
Start: 2021-06-01

## 2021-06-01 RX ORDER — ERGOCALCIFEROL 1.25 MG/1
CAPSULE ORAL
COMMUNITY
Start: 2021-05-20

## 2021-06-01 RX ORDER — BUSPIRONE HYDROCHLORIDE 5 MG/1
TABLET ORAL
COMMUNITY
Start: 2021-05-24

## 2021-06-01 RX ORDER — LEVOTHYROXINE SODIUM 0.05 MG/1
TABLET ORAL
Qty: 90 TABLET | Refills: 0 | Status: SHIPPED | OUTPATIENT
Start: 2021-06-01 | End: 2021-08-18

## 2021-06-04 ENCOUNTER — OFFICE VISIT (OUTPATIENT)
Dept: CARDIOLOGY | Age: 81
End: 2021-06-04

## 2021-06-04 VITALS
DIASTOLIC BLOOD PRESSURE: 80 MMHG | BODY MASS INDEX: 31.47 KG/M2 | HEART RATE: 68 BPM | WEIGHT: 171 LBS | SYSTOLIC BLOOD PRESSURE: 122 MMHG | HEIGHT: 62 IN

## 2021-06-04 DIAGNOSIS — I35.1 NONRHEUMATIC AORTIC (VALVE) INSUFFICIENCY: ICD-10-CM

## 2021-06-04 DIAGNOSIS — Z82.49 FAMILY HISTORY OF CORONARY ARTERIOSCLEROSIS: ICD-10-CM

## 2021-06-04 DIAGNOSIS — I21.4 NON-ST ELEVATION MI (NSTEMI) (CMD): ICD-10-CM

## 2021-06-04 DIAGNOSIS — I10 HYPERTENSION, BENIGN: ICD-10-CM

## 2021-06-04 DIAGNOSIS — E78.00 PURE HYPERCHOLESTEROLEMIA: ICD-10-CM

## 2021-06-04 DIAGNOSIS — R60.0 LOCALIZED EDEMA: ICD-10-CM

## 2021-06-04 DIAGNOSIS — I65.23 ASYMPTOMATIC CAROTID ARTERY STENOSIS, BILATERAL: Primary | ICD-10-CM

## 2021-06-04 DIAGNOSIS — I51.81 TAKOTSUBO CARDIOMYOPATHY: ICD-10-CM

## 2021-06-04 PROCEDURE — 99215 OFFICE O/P EST HI 40 MIN: CPT | Performed by: INTERNAL MEDICINE

## 2021-06-04 ASSESSMENT — ENCOUNTER SYMPTOMS
WEIGHT LOSS: 0
FEVER: 0
BRUISES/BLEEDS EASILY: 0
COUGH: 0
ALLERGIC/IMMUNOLOGIC COMMENTS: NO NEW FOOD ALLERGIES
HEMOPTYSIS: 0
WEIGHT GAIN: 0
SUSPICIOUS LESIONS: 0
HEMATOCHEZIA: 0
CHILLS: 0

## 2021-06-04 ASSESSMENT — PATIENT HEALTH QUESTIONNAIRE - PHQ9
CLINICAL INTERPRETATION OF PHQ2 SCORE: NO FURTHER SCREENING NEEDED
SUM OF ALL RESPONSES TO PHQ9 QUESTIONS 1 AND 2: 0
1. LITTLE INTEREST OR PLEASURE IN DOING THINGS: NOT AT ALL
SUM OF ALL RESPONSES TO PHQ9 QUESTIONS 1 AND 2: 0
CLINICAL INTERPRETATION OF PHQ9 SCORE: NO FURTHER SCREENING NEEDED
2. FEELING DOWN, DEPRESSED OR HOPELESS: NOT AT ALL

## 2021-06-10 ENCOUNTER — APPOINTMENT (OUTPATIENT)
Dept: CARDIAC REHAB | Facility: HOSPITAL | Age: 81
End: 2021-06-10
Attending: INTERNAL MEDICINE
Payer: MEDICARE

## 2021-06-17 ENCOUNTER — NURSE ONLY (OUTPATIENT)
Dept: FAMILY MEDICINE CLINIC | Facility: CLINIC | Age: 81
End: 2021-06-17
Payer: MEDICARE

## 2021-06-17 DIAGNOSIS — E53.8 B12 DEFICIENCY: Primary | ICD-10-CM

## 2021-06-17 PROCEDURE — 96372 THER/PROPH/DIAG INJ SC/IM: CPT | Performed by: NURSE PRACTITIONER

## 2021-06-17 RX ORDER — CYANOCOBALAMIN 1000 UG/ML
1000 INJECTION INTRAMUSCULAR; SUBCUTANEOUS ONCE
Status: COMPLETED | OUTPATIENT
Start: 2021-06-17 | End: 2021-06-17

## 2021-06-17 RX ADMIN — CYANOCOBALAMIN 1000 MCG: 1000 INJECTION INTRAMUSCULAR; SUBCUTANEOUS at 13:13:00

## 2021-07-09 ENCOUNTER — TELEPHONE (OUTPATIENT)
Dept: FAMILY MEDICINE CLINIC | Facility: CLINIC | Age: 81
End: 2021-07-09

## 2021-07-09 NOTE — TELEPHONE ENCOUNTER
Pt and her daughter insistting preop with Dr Yun More only for cataract surgery. Pts eyes will be measured 7/13 and the surgery will be within a week of that. Pt also states she needs knee injections and b12 and needs to discuss her stomach issues.

## 2021-07-09 NOTE — TELEPHONE ENCOUNTER
Please see below message, will you add pt to your schedule for pre-op and all these issues prior to 7/19?

## 2021-07-09 NOTE — TELEPHONE ENCOUNTER
Pt called back and states surgeon says she does not need labs or ekg. She just needs form filled out and she will bring in at time of visit.

## 2021-07-12 ENCOUNTER — OFFICE VISIT (OUTPATIENT)
Dept: FAMILY MEDICINE CLINIC | Facility: CLINIC | Age: 81
End: 2021-07-12
Payer: MEDICARE

## 2021-07-12 VITALS
RESPIRATION RATE: 16 BRPM | BODY MASS INDEX: 31.83 KG/M2 | TEMPERATURE: 97 F | HEIGHT: 62 IN | SYSTOLIC BLOOD PRESSURE: 126 MMHG | OXYGEN SATURATION: 97 % | HEART RATE: 62 BPM | DIASTOLIC BLOOD PRESSURE: 76 MMHG | WEIGHT: 173 LBS

## 2021-07-12 DIAGNOSIS — E78.00 PURE HYPERCHOLESTEROLEMIA: ICD-10-CM

## 2021-07-12 DIAGNOSIS — E03.9 ACQUIRED HYPOTHYROIDISM: ICD-10-CM

## 2021-07-12 DIAGNOSIS — H26.9 CATARACT OF LEFT EYE, UNSPECIFIED CATARACT TYPE: ICD-10-CM

## 2021-07-12 DIAGNOSIS — R10.31 RLQ ABDOMINAL PAIN: ICD-10-CM

## 2021-07-12 DIAGNOSIS — E55.9 VITAMIN D DEFICIENCY: ICD-10-CM

## 2021-07-12 DIAGNOSIS — E53.9 VITAMIN B DEFICIENCY: Primary | ICD-10-CM

## 2021-07-12 DIAGNOSIS — I10 BENIGN HYPERTENSION: ICD-10-CM

## 2021-07-12 DIAGNOSIS — Z01.818 PREOP EXAMINATION: ICD-10-CM

## 2021-07-12 PROBLEM — R07.9 CHEST PAIN OF UNCERTAIN ETIOLOGY: Status: RESOLVED | Noted: 2021-02-09 | Resolved: 2021-07-12

## 2021-07-12 PROBLEM — R73.9 HYPERGLYCEMIA: Status: RESOLVED | Noted: 2021-02-09 | Resolved: 2021-07-12

## 2021-07-12 PROCEDURE — 96372 THER/PROPH/DIAG INJ SC/IM: CPT | Performed by: FAMILY MEDICINE

## 2021-07-12 PROCEDURE — 99214 OFFICE O/P EST MOD 30 MIN: CPT | Performed by: FAMILY MEDICINE

## 2021-07-12 RX ORDER — CYANOCOBALAMIN 1000 UG/ML
1000 INJECTION INTRAMUSCULAR; SUBCUTANEOUS ONCE
Status: COMPLETED | OUTPATIENT
Start: 2021-07-12 | End: 2021-07-12

## 2021-07-12 RX ORDER — ERGOCALCIFEROL 1.25 MG/1
50000 CAPSULE ORAL WEEKLY
Qty: 4 CAPSULE | Refills: 2 | Status: SHIPPED | OUTPATIENT
Start: 2021-07-12 | End: 2021-08-11

## 2021-07-12 RX ADMIN — CYANOCOBALAMIN 1000 MCG: 1000 INJECTION INTRAMUSCULAR; SUBCUTANEOUS at 10:04:00

## 2021-07-12 NOTE — TELEPHONE ENCOUNTER
Faxed pre-op clearance for cataract surgery to Dr. Clarice Barney. Received confirmation. Placed in Dr. Yury Steel folder.

## 2021-07-12 NOTE — PROGRESS NOTES
CC: Preop exam.      HPI:  Laureano De León is a 80year old female who presents for a pre-operative physical exam  By Dr. Db Cuevas  request. Patient is to have L eye  cataract removal surgery ,secondary to progression of the cataract and difficulty with t (0.25 mg total) by mouth nightly as needed. 30 tablet 5   • Vitamin D3 25 MCG (1000 UT) Oral Tab Take 1,000 Units by mouth daily. • simvastatin 20 MG Oral Tab Take 60 mg by mouth nightly. • simvastatin 40 MG Oral Tab Take 60 mg by mouth nightly. Disorder Father    • Other (acute MI) Father    • Other (amyotrophic lateral sclerosis) Mother    • Other (ALS) Mother    • Other (CABG) Sister    • Diabetes Sister    • Cancer Sister    • Obesity Sister    • Heart Disease Sister    • Breast Cancer Self 76 presents for a pre-operative physical exam for cataract surgery secondary to worsening cataract. Vit. B12 def: vit. B21 1000ug given today IM, vit. Def, Hypothyroid, HTN, HL, well controlled , patient is approved for surgery.

## 2021-07-30 LAB — AMB EXT COLOGUARD RESULT: NEGATIVE

## 2021-08-02 ENCOUNTER — TELEPHONE (OUTPATIENT)
Dept: FAMILY MEDICINE CLINIC | Facility: CLINIC | Age: 81
End: 2021-08-02

## 2021-08-02 NOTE — TELEPHONE ENCOUNTER
Called pt and spoke w/daughter (OK per HIPAA). Let her know that pt's cologuard was negative. Daughter expresses understanding and does not have any questions at this time.

## 2021-08-11 ENCOUNTER — NURSE ONLY (OUTPATIENT)
Dept: FAMILY MEDICINE CLINIC | Facility: CLINIC | Age: 81
End: 2021-08-11
Payer: MEDICARE

## 2021-08-11 DIAGNOSIS — E53.8 B12 DEFICIENCY: Primary | ICD-10-CM

## 2021-08-11 PROCEDURE — 96372 THER/PROPH/DIAG INJ SC/IM: CPT | Performed by: NURSE PRACTITIONER

## 2021-08-11 RX ORDER — CYANOCOBALAMIN 1000 UG/ML
1000 INJECTION INTRAMUSCULAR; SUBCUTANEOUS ONCE
Status: COMPLETED | OUTPATIENT
Start: 2021-08-11 | End: 2021-08-11

## 2021-08-11 RX ADMIN — CYANOCOBALAMIN 1000 MCG: 1000 INJECTION INTRAMUSCULAR; SUBCUTANEOUS at 13:22:00

## 2021-08-18 RX ORDER — LEVOTHYROXINE SODIUM 0.05 MG/1
TABLET ORAL
Qty: 90 TABLET | Refills: 1 | Status: SHIPPED | OUTPATIENT
Start: 2021-08-18

## 2021-08-20 DIAGNOSIS — R42 DIZZINESS: ICD-10-CM

## 2021-08-20 NOTE — TELEPHONE ENCOUNTER
Medication: BUSPIRONE HCL 5 MG Oral Tab    Date of last refill: 5/24/2021 (#60/2)  [place]    Last office visit: 10/29/2020  Due back to clinic per last office note:  2 months  Date next office visit scheduled:     Fu

## 2021-08-23 ENCOUNTER — OFFICE VISIT (OUTPATIENT)
Dept: FAMILY MEDICINE CLINIC | Facility: CLINIC | Age: 81
End: 2021-08-23
Payer: MEDICARE

## 2021-08-23 VITALS
SYSTOLIC BLOOD PRESSURE: 118 MMHG | RESPIRATION RATE: 16 BRPM | BODY MASS INDEX: 31.83 KG/M2 | DIASTOLIC BLOOD PRESSURE: 64 MMHG | HEART RATE: 76 BPM | HEIGHT: 62 IN | WEIGHT: 173 LBS

## 2021-08-23 DIAGNOSIS — M17.11 ARTHRITIS OF RIGHT KNEE: Primary | ICD-10-CM

## 2021-08-23 PROCEDURE — 20610 DRAIN/INJ JOINT/BURSA W/O US: CPT | Performed by: FAMILY MEDICINE

## 2021-08-23 RX ORDER — SPIRONOLACTONE 25 MG/1
0.5 TABLET ORAL DAILY
COMMUNITY
Start: 2021-08-18

## 2021-08-23 RX ORDER — BUSPIRONE HYDROCHLORIDE 5 MG/1
TABLET ORAL
Qty: 60 TABLET | Refills: 0 | Status: SHIPPED | OUTPATIENT
Start: 2021-08-23 | End: 2021-09-20

## 2021-08-23 NOTE — PATIENT INSTRUCTIONS
Osteoarthritis: Common Sites  Osteoarthritis (OA) is sometimes called degenerative joint disease or wear-and-tear arthritis. It's the most common type of arthritis. In OA, the cartilage wears away.  Cartilage covers the ends of bones and acts as a cushion Symptoms include joint stiffness, and pain. It's also called degenerative joint disease. Home care  · When a joint is more sore than usual, rest it for a day or two. · Heat can help relieve stiffness.  Take a hot bath or apply a heating pad for up to 30 of these occur:  · Redness or swelling of a painful joint  · Discharge or pus from a painful joint  · Fever of 100.4ºF (38ºC) or higher, or as directed by your healthcare provider  · Worsening joint pain  · Decreased ability to move the joint or bear weigh

## 2021-09-13 ENCOUNTER — NURSE ONLY (OUTPATIENT)
Dept: FAMILY MEDICINE CLINIC | Facility: CLINIC | Age: 81
End: 2021-09-13
Payer: MEDICARE

## 2021-09-13 DIAGNOSIS — E53.8 B12 DEFICIENCY: Primary | ICD-10-CM

## 2021-09-13 PROCEDURE — 96372 THER/PROPH/DIAG INJ SC/IM: CPT | Performed by: FAMILY MEDICINE

## 2021-09-13 RX ORDER — CYANOCOBALAMIN 1000 UG/ML
1000 INJECTION INTRAMUSCULAR; SUBCUTANEOUS ONCE
Status: COMPLETED | OUTPATIENT
Start: 2021-09-13 | End: 2021-09-13

## 2021-09-13 RX ADMIN — CYANOCOBALAMIN 1000 MCG: 1000 INJECTION INTRAMUSCULAR; SUBCUTANEOUS at 13:19:00

## 2021-09-14 ENCOUNTER — MED REC SCAN ONLY (OUTPATIENT)
Dept: FAMILY MEDICINE CLINIC | Facility: CLINIC | Age: 81
End: 2021-09-14

## 2021-09-19 DIAGNOSIS — R42 DIZZINESS: ICD-10-CM

## 2021-09-20 RX ORDER — BUSPIRONE HYDROCHLORIDE 5 MG/1
TABLET ORAL
Qty: 60 TABLET | Refills: 0 | Status: SHIPPED | OUTPATIENT
Start: 2021-09-20 | End: 2021-10-25

## 2021-09-20 NOTE — TELEPHONE ENCOUNTER
PRO on VM (ok per HIPAA consent) to schedule a f/u eleazar't before future refills.     Medication: : BUSPIRONE 5 MG    Date of last refill: 8/23/21 (#60/0)  [place]     Last office visit: 10/29/2021  Due back to clinic per

## 2021-09-23 DIAGNOSIS — R42 DIZZINESS: ICD-10-CM

## 2021-09-24 RX ORDER — BUSPIRONE HYDROCHLORIDE 5 MG/1
TABLET ORAL
Qty: 60 TABLET | Refills: 0 | OUTPATIENT
Start: 2021-09-24

## 2021-10-01 ENCOUNTER — APPOINTMENT (OUTPATIENT)
Dept: CARDIOLOGY | Age: 81
End: 2021-10-01

## 2021-10-04 ENCOUNTER — MED REC SCAN ONLY (OUTPATIENT)
Dept: FAMILY MEDICINE CLINIC | Facility: CLINIC | Age: 81
End: 2021-10-04

## 2021-10-05 ENCOUNTER — TELEPHONE (OUTPATIENT)
Dept: FAMILY MEDICINE CLINIC | Facility: CLINIC | Age: 81
End: 2021-10-05

## 2021-10-05 DIAGNOSIS — Z12.31 ENCOUNTER FOR SCREENING MAMMOGRAM FOR MALIGNANT NEOPLASM OF BREAST: Primary | ICD-10-CM

## 2021-10-05 NOTE — TELEPHONE ENCOUNTER
Dtr sent message through her [employer] requesting [employer] orders for pt. Orders placed. Dtr notified.

## 2021-10-07 DIAGNOSIS — E55.9 VITAMIN D DEFICIENCY: ICD-10-CM

## 2021-10-07 RX ORDER — ERGOCALCIFEROL 1.25 MG/1
50000 CAPSULE ORAL WEEKLY
Qty: 4 CAPSULE | Refills: 0 | OUTPATIENT
Start: 2021-10-07 | End: 2021-11-06

## 2021-10-09 ENCOUNTER — HOSPITAL ENCOUNTER (EMERGENCY)
Facility: HOSPITAL | Age: 81
Discharge: HOME | End: 2021-10-09
Attending: EMERGENCY MEDICINE
Payer: MEDICARE

## 2021-10-09 VITALS
BODY MASS INDEX: 31.1 KG/M2 | SYSTOLIC BLOOD PRESSURE: 182 MMHG | OXYGEN SATURATION: 95 % | HEIGHT: 62 IN | HEART RATE: 70 BPM | RESPIRATION RATE: 18 BRPM | TEMPERATURE: 98 F | DIASTOLIC BLOOD PRESSURE: 69 MMHG | WEIGHT: 169 LBS

## 2021-10-09 DIAGNOSIS — I10 PRIMARY HYPERTENSION: Primary | ICD-10-CM

## 2021-10-09 PROCEDURE — 99283 EMERGENCY DEPT VISIT LOW MDM: CPT

## 2021-10-09 RX ORDER — LOSARTAN POTASSIUM 25 MG/1
12.5 TABLET ORAL DAILY
Status: DISCONTINUED | OUTPATIENT
Start: 2021-10-09 | End: 2021-10-09

## 2021-10-09 NOTE — ED PROVIDER NOTES
Patient Seen in: [facility] Emergency Department      History   Patient presents with:  Hypertension    Stated Complaint: hypertension 200s/104    Subjective:   HPI    This is a pleasant 80-year-old female who presents to the emerge department stati Procedure: ANTERIOR POSTERIOR REPAIR;  Surgeon: Robert Gonzales MD;  Location: [facility] MAIN OR   • RADIATION RIGHT                  Social History    Tobacco Use      Smoking status: Never Smoker      Smokeless tobacco: Never Used    Vaping Use      Vaping Use Patient appears well and nontoxic. She is asymptomatic at this time. Blood pressure had come down to 180/85 and then did go up to 581 systolic. She will be given her dose of losartan.   I explained to the patient at this time she is asymptomatic with concerns. Patient felt comfortable going home. Disposition and Plan     Clinical Impression:  Primary hypertension  (primary encounter diagnosis)     Disposition:  There is no disposition on file for this visit.   There is no disposit

## 2021-10-11 ENCOUNTER — NURSE ONLY (OUTPATIENT)
Dept: FAMILY MEDICINE CLINIC | Facility: CLINIC | Age: 81
End: 2021-10-11
Payer: MEDICARE

## 2021-10-11 ENCOUNTER — TELEPHONE (OUTPATIENT)
Dept: FAMILY MEDICINE CLINIC | Facility: CLINIC | Age: 81
End: 2021-10-11

## 2021-10-11 VITALS — SYSTOLIC BLOOD PRESSURE: 120 MMHG | DIASTOLIC BLOOD PRESSURE: 76 MMHG

## 2021-10-11 DIAGNOSIS — E53.8 B12 DEFICIENCY: Primary | ICD-10-CM

## 2021-10-11 PROCEDURE — 96372 THER/PROPH/DIAG INJ SC/IM: CPT | Performed by: NURSE PRACTITIONER

## 2021-10-11 RX ORDER — CYANOCOBALAMIN 1000 UG/ML
1000 INJECTION INTRAMUSCULAR; SUBCUTANEOUS ONCE
Status: COMPLETED | OUTPATIENT
Start: 2021-10-11 | End: 2021-10-11

## 2021-10-11 RX ADMIN — CYANOCOBALAMIN 1000 MCG: 1000 INJECTION INTRAMUSCULAR; SUBCUTANEOUS at 15:10:00

## 2021-10-11 NOTE — TELEPHONE ENCOUNTER
Pt was here for BP check nurse visit today. She dropped off Parking Placard form. Placed in Dr. Zapien Late to fill out. Will call pt for pick-up when ready.

## 2021-10-13 NOTE — TELEPHONE ENCOUNTER
Form completed & signed by Dr. Arturo IGNACIO (OK per HIPAA). Original at  for pt, asked her to bring photo-id when she picks-up. Copy sent to scan & copy kept in back.

## 2021-10-14 DIAGNOSIS — E55.9 VITAMIN D DEFICIENCY: ICD-10-CM

## 2021-10-14 RX ORDER — ERGOCALCIFEROL 1.25 MG/1
50000 CAPSULE ORAL WEEKLY
Qty: 4 CAPSULE | Refills: 0 | OUTPATIENT
Start: 2021-10-14 | End: 2021-11-13

## 2021-10-18 ENCOUNTER — HOSPITAL ENCOUNTER (OUTPATIENT)
Dept: BONE DENSITY | Age: 81
Discharge: HOME | End: 2021-10-18
Attending: FAMILY MEDICINE
Payer: MEDICARE

## 2021-10-18 DIAGNOSIS — Z78.0 ASYMPTOMATIC MENOPAUSAL STATE: ICD-10-CM

## 2021-10-18 DIAGNOSIS — E55.9 VITAMIN D DEFICIENCY: ICD-10-CM

## 2021-10-18 PROCEDURE — 77080 DXA BONE DENSITY AXIAL: CPT | Performed by: FAMILY MEDICINE

## 2021-10-19 ENCOUNTER — TELEPHONE (OUTPATIENT)
Dept: FAMILY MEDICINE CLINIC | Facility: CLINIC | Age: 81
End: 2021-10-19

## 2021-10-19 NOTE — TELEPHONE ENCOUNTER
----- Message from Chencho Matos MD sent at 10/19/2021  2:47 PM CDT -----  Normal results.     XR DEXA BONE DENSITOMETRY

## 2021-10-25 DIAGNOSIS — R42 DIZZINESS: ICD-10-CM

## 2021-10-25 RX ORDER — BUSPIRONE HYDROCHLORIDE 5 MG/1
TABLET ORAL
Qty: 60 TABLET | Refills: 0 | Status: SHIPPED | OUTPATIENT
Start: 2021-10-25 | End: 2021-11-29

## 2021-10-25 NOTE — TELEPHONE ENCOUNTER
Medication: BUSPIRONE 5 MG Oral Tab    Date of last refill: 09/20/2021 (#60/0)  [place]    Last office visit: 02/19/2021  Due back to clinic per last office note:  Around 05/19/2021  Date next office visit scheduled:

## 2021-10-30 NOTE — PATIENT INSTRUCTIONS
Dr. Marin Gathers surgery  Sports medicine  Joint replacement      [id number]  [facility]      Phone: [phone]  Treating Anxiety Disorders with Medicine  An anxiety disorde this medicine, until you know how it affects you. Never use alcohol or other drugs with anti-anxiety medicines. This could result in loss of muscular control, sedation, coma, or death. Also, use only the amount of medicine prescribed for you.  If you think drugs or alcohol, you may not have a problem with medicines used to treat anxiety disorders. But always discuss the medicines with your healthcare provider before taking them.  If you have a history of addiction, you may not be able to use certain medicines

## 2021-10-30 NOTE — PROGRESS NOTES
Meryle Flint is a 80year old female. Patient presents with: Follow - Up: blood pessure       HPI:   Pt complains of symptoms of anxiety.    Has been having these sx's for feeling anxious almost every day, every morning, sleeps well, Buspar does not 1 tablet by mouth daily. • Coenzyme Q10 (COQ10) 100 MG Oral Cap Take 1 tablet by mouth daily. • Glucosamine-Fish Oil-EPA-DHA (GLUCOSAMINE & FISH OIL OR) Take 1 capsule by mouth daily.        • vitamin E 400 UNITS Oral Cap Take 400 Units by mouth homicidal thoughts. panic attacks. Long discussion about pathophysiology of anxiety. Treatment options discussed. Patient opted for treatment with medications.   Risks and benefits of medication(s) discussed in detail, including possible worsening of dep

## 2021-11-04 ENCOUNTER — HOSPITAL ENCOUNTER (OUTPATIENT)
Dept: ULTRASOUND IMAGING | Age: 81
Discharge: HOME | End: 2021-11-04
Attending: FAMILY MEDICINE
Payer: MEDICARE

## 2021-11-04 DIAGNOSIS — R10.31 RLQ ABDOMINAL PAIN: ICD-10-CM

## 2021-11-04 PROCEDURE — 76700 US EXAM ABDOM COMPLETE: CPT | Performed by: FAMILY MEDICINE

## 2021-11-26 DIAGNOSIS — R42 DIZZINESS: ICD-10-CM

## 2021-11-29 RX ORDER — BUSPIRONE HYDROCHLORIDE 5 MG/1
TABLET ORAL
Qty: 60 TABLET | Refills: 1 | Status: SHIPPED | OUTPATIENT
Start: 2021-11-29 | End: 2022-01-20

## 2021-11-29 NOTE — TELEPHONE ENCOUNTER
Ilana't scheduled on 2/11. Patient 's daughter states her mom accompanied her to her ilana't with Dr Oxana Mitchell and discussed patient at that time.     Medication: BUSPIRONE 5 MG     Date of last refill: 10/25/21 (#60/0)  [place]

## 2021-12-06 NOTE — PROGRESS NOTES
Sabine Waldron is a 80year old female. Patient presents with:  Medication Follow-Up: pt complains of nausea / no appetite until about 9pm every day. she believes this is due to sertraline.        HPI:   Pt  F/u anxiety, feeling much better, no more pan by mouth daily. • Coenzyme Q10 (COQ10) 100 MG Oral Cap Take 1 tablet by mouth daily. • Glucosamine-Fish Oil-EPA-DHA (GLUCOSAMINE & FISH OIL OR) Take 1 capsule by mouth daily. • vitamin E 400 UNITS Oral Cap Take 400 Units by mouth daily. counseling. Cont:half Zoloft. Requested Prescriptions     Signed Prescriptions Disp Refills   • ALPRAZolam (XANAX) 0.25 MG Oral Tab 90 tablet 1     Sig: Take 1 tablet (0.25 mg total) by mouth daily as needed. F/u in one month.

## 2021-12-06 NOTE — PATIENT INSTRUCTIONS
Tomasa Paredes D.O. or Dr. Deyanira Arias   Rheumatology      [id number] [place] [address]  Phone: [phone]  Fax: [phone]          [facility]  [address]  Phone: [phone]  Fax: [phone]

## 2022-01-10 ENCOUNTER — OFFICE VISIT (OUTPATIENT)
Dept: FAMILY MEDICINE CLINIC | Facility: CLINIC | Age: 82
End: 2022-01-10
Payer: MEDICARE

## 2022-01-10 VITALS
HEIGHT: 62 IN | WEIGHT: 172 LBS | BODY MASS INDEX: 31.65 KG/M2 | RESPIRATION RATE: 16 BRPM | DIASTOLIC BLOOD PRESSURE: 64 MMHG | SYSTOLIC BLOOD PRESSURE: 124 MMHG | OXYGEN SATURATION: 98 % | HEART RATE: 68 BPM

## 2022-01-10 DIAGNOSIS — E78.00 PURE HYPERCHOLESTEROLEMIA: ICD-10-CM

## 2022-01-10 DIAGNOSIS — M54.31 BILATERAL SCIATICA: Primary | ICD-10-CM

## 2022-01-10 DIAGNOSIS — E03.9 ACQUIRED HYPOTHYROIDISM: ICD-10-CM

## 2022-01-10 DIAGNOSIS — M54.32 BILATERAL SCIATICA: Primary | ICD-10-CM

## 2022-01-10 PROCEDURE — 99214 OFFICE O/P EST MOD 30 MIN: CPT | Performed by: FAMILY MEDICINE

## 2022-01-10 RX ORDER — METHYLPREDNISOLONE ACETATE 80 MG/ML
80 INJECTION, SUSPENSION INTRA-ARTICULAR; INTRALESIONAL; INTRAMUSCULAR; SOFT TISSUE ONCE
Status: COMPLETED | OUTPATIENT
Start: 2022-01-10 | End: 2022-01-10

## 2022-01-10 RX ORDER — MEDROXYPROGESTERONE ACETATE 150 MG/ML
150 INJECTION, SUSPENSION INTRAMUSCULAR ONCE
Status: DISCONTINUED | OUTPATIENT
Start: 2022-01-10 | End: 2022-01-10

## 2022-01-10 RX ADMIN — METHYLPREDNISOLONE ACETATE 80 MG: 80 INJECTION, SUSPENSION INTRA-ARTICULAR; INTRALESIONAL; INTRAMUSCULAR; SOFT TISSUE at 11:53:00

## 2022-01-10 NOTE — PROGRESS NOTES
Patient presents with:  Medication Follow-Up  Sciatica: c/o sciatic pain with mornings worse / improves after moving         HPI:      Pt has R, L  Lower back pain pain, sharp, throbbing, radiation to the bilateral lower legs, worsening, certain positions Oil-EPA-DHA (GLUCOSAMINE & FISH OIL OR) Take 1 capsule by mouth daily. • vitamin E 400 UNITS Oral Cap Take 400 Units by mouth daily.        • Blood Glucose Monitoring Suppl (ONETOUCH VERIO FLEX SYSTEM) w/Device Does not apply Kit Tutee Verio Flex S Smoking status: Never Smoker      Smokeless tobacco: Never Used    Vaping Use      Vaping Use: Never used    Alcohol use: No      Comment: holidays    Drug use: No            ROS:  GEN: no fever, no chills, no fatigue  CHEST: no chest pains.   SKIN: no rash

## 2022-01-20 DIAGNOSIS — R42 DIZZINESS: ICD-10-CM

## 2022-01-20 NOTE — TELEPHONE ENCOUNTER
Medication: Buspirone     Date of last refill: 11/29/21 (#60/1)  [place]      Last office visit: 2/19/2021  Due back to clinic per last office note:  3 months  Date next office visit scheduled:    Future Appointments   D

## 2022-01-21 RX ORDER — BUSPIRONE HYDROCHLORIDE 5 MG/1
5 TABLET ORAL 2 TIMES DAILY
Qty: 60 TABLET | Refills: 0 | Status: SHIPPED | OUTPATIENT
Start: 2022-01-21

## 2022-01-26 DIAGNOSIS — R42 DIZZINESS: ICD-10-CM

## 2022-01-26 NOTE — TELEPHONE ENCOUNTER
Called [facility] & spoke with Yanira Duval. Kallie confirmed patient picked up busPIRone 5 MG Oral Tab on 1/21/22.     Refused Rx was already filled

## 2022-01-27 RX ORDER — BUSPIRONE HYDROCHLORIDE 5 MG/1
TABLET ORAL
Qty: 60 TABLET | Refills: 0 | OUTPATIENT
Start: 2022-01-27

## 2022-02-11 ENCOUNTER — OFFICE VISIT (OUTPATIENT)
Dept: NEUROLOGY | Facility: CLINIC | Age: 82
End: 2022-02-11
Payer: MEDICARE

## 2022-02-11 VITALS
RESPIRATION RATE: 14 BRPM | HEART RATE: 78 BPM | DIASTOLIC BLOOD PRESSURE: 70 MMHG | BODY MASS INDEX: 32 KG/M2 | WEIGHT: 173 LBS | SYSTOLIC BLOOD PRESSURE: 132 MMHG

## 2022-02-11 DIAGNOSIS — R42 DIZZINESS: Primary | ICD-10-CM

## 2022-02-11 DIAGNOSIS — F41.9 ANXIETY: ICD-10-CM

## 2022-02-11 PROCEDURE — 99213 OFFICE O/P EST LOW 20 MIN: CPT | Performed by: OTHER

## 2022-02-11 RX ORDER — BUSPIRONE HYDROCHLORIDE 5 MG/1
5 TABLET ORAL 2 TIMES DAILY
Qty: 180 TABLET | Refills: 3 | Status: SHIPPED | OUTPATIENT
Start: 2022-02-11

## 2022-02-15 RX ORDER — LEVOTHYROXINE SODIUM 0.05 MG/1
TABLET ORAL
Qty: 90 TABLET | Refills: 0 | Status: SHIPPED | OUTPATIENT
Start: 2022-02-15

## 2022-02-15 RX ORDER — SERTRALINE HYDROCHLORIDE 25 MG/1
25 TABLET, FILM COATED ORAL DAILY
Qty: 30 TABLET | Refills: 0 | Status: SHIPPED | OUTPATIENT
Start: 2022-02-15

## 2022-03-04 ENCOUNTER — LAB ENCOUNTER (OUTPATIENT)
Dept: LAB | Age: 82
End: 2022-03-04
Attending: FAMILY MEDICINE
Payer: MEDICARE

## 2022-03-04 DIAGNOSIS — E03.9 ACQUIRED HYPOTHYROIDISM: ICD-10-CM

## 2022-03-04 DIAGNOSIS — R60.0 LOCALIZED EDEMA: ICD-10-CM

## 2022-03-04 DIAGNOSIS — I10 HYPERTENSION, BENIGN: Primary | ICD-10-CM

## 2022-03-04 DIAGNOSIS — E78.00 PURE HYPERCHOLESTEROLEMIA: ICD-10-CM

## 2022-03-04 DIAGNOSIS — E55.9 VITAMIN D DEFICIENCY: ICD-10-CM

## 2022-03-04 LAB
ALBUMIN SERPL-MCNC: 3.8 G/DL (ref 3.4–5)
ALBUMIN/GLOB SERPL: 1.2 {RATIO} (ref 1–2)
ALP LIVER SERPL-CCNC: 82 U/L
ALT SERPL-CCNC: 29 U/L
ANION GAP SERPL CALC-SCNC: 3 MMOL/L (ref 0–18)
AST SERPL-CCNC: 17 U/L (ref 15–37)
BASOPHILS # BLD AUTO: 0.05 X10(3) UL (ref 0–0.2)
BASOPHILS NFR BLD AUTO: 0.8 %
BILIRUB SERPL-MCNC: 0.9 MG/DL (ref 0.1–2)
BUN BLD-MCNC: 16 MG/DL (ref 7–18)
CALCIUM BLD-MCNC: 9.1 MG/DL (ref 8.5–10.1)
CHLORIDE SERPL-SCNC: 107 MMOL/L (ref 98–112)
CHOLEST SERPL-MCNC: 180 MG/DL (ref ?–200)
CO2 SERPL-SCNC: 30 MMOL/L (ref 21–32)
CREAT BLD-MCNC: 0.83 MG/DL
EOSINOPHIL # BLD AUTO: 0.31 X10(3) UL (ref 0–0.7)
EOSINOPHIL NFR BLD AUTO: 5.2 %
ERYTHROCYTE [DISTWIDTH] IN BLOOD BY AUTOMATED COUNT: 13.3 %
FASTING PATIENT LIPID ANSWER: YES
FASTING STATUS PATIENT QL REPORTED: YES
GLOBULIN PLAS-MCNC: 3.1 G/DL (ref 2.8–4.4)
GLUCOSE BLD-MCNC: 109 MG/DL (ref 70–99)
HCT VFR BLD AUTO: 41.1 %
HDLC SERPL-MCNC: 79 MG/DL (ref 40–59)
HGB BLD-MCNC: 12.7 G/DL
IMM GRANULOCYTES # BLD AUTO: 0.02 X10(3) UL (ref 0–1)
IMM GRANULOCYTES NFR BLD: 0.3 %
LDLC SERPL CALC-MCNC: 84 MG/DL (ref ?–100)
LYMPHOCYTES # BLD AUTO: 1.43 X10(3) UL (ref 1–4)
LYMPHOCYTES NFR BLD AUTO: 24.2 %
MCH RBC QN AUTO: 29.7 PG (ref 26–34)
MCHC RBC AUTO-ENTMCNC: 30.9 G/DL (ref 31–37)
MCV RBC AUTO: 96.3 FL
MONOCYTES # BLD AUTO: 0.68 X10(3) UL (ref 0.1–1)
MONOCYTES NFR BLD AUTO: 11.5 %
NEUTROPHILS # BLD AUTO: 3.43 X10(3) UL (ref 1.5–7.7)
NEUTROPHILS NFR BLD AUTO: 58 %
NONHDLC SERPL-MCNC: 101 MG/DL (ref ?–130)
OSMOLALITY SERPL CALC.SUM OF ELEC: 292 MOSM/KG (ref 275–295)
PLATELET # BLD AUTO: 219 10(3)UL (ref 150–450)
POTASSIUM SERPL-SCNC: 4.5 MMOL/L (ref 3.5–5.1)
PROT SERPL-MCNC: 6.9 G/DL (ref 6.4–8.2)
RBC # BLD AUTO: 4.27 X10(6)UL
SODIUM SERPL-SCNC: 140 MMOL/L (ref 136–145)
TRIGL SERPL-MCNC: 95 MG/DL (ref 30–149)
TSI SER-ACNC: 1.03 MIU/ML (ref 0.36–3.74)
VIT D+METAB SERPL-MCNC: 29.8 NG/ML (ref 30–100)
VLDLC SERPL CALC-MCNC: 15 MG/DL (ref 0–30)
WBC # BLD AUTO: 5.9 X10(3) UL (ref 4–11)

## 2022-03-04 PROCEDURE — 84443 ASSAY THYROID STIM HORMONE: CPT

## 2022-03-04 PROCEDURE — 85025 COMPLETE CBC W/AUTO DIFF WBC: CPT

## 2022-03-04 PROCEDURE — 80061 LIPID PANEL: CPT

## 2022-03-04 PROCEDURE — 36415 COLL VENOUS BLD VENIPUNCTURE: CPT

## 2022-03-04 PROCEDURE — 82306 VITAMIN D 25 HYDROXY: CPT

## 2022-03-04 PROCEDURE — 80053 COMPREHEN METABOLIC PANEL: CPT

## 2022-03-07 ENCOUNTER — TELEPHONE (OUTPATIENT)
Dept: FAMILY MEDICINE CLINIC | Facility: CLINIC | Age: 82
End: 2022-03-07

## 2022-03-07 ENCOUNTER — OFFICE VISIT (OUTPATIENT)
Dept: FAMILY MEDICINE CLINIC | Facility: CLINIC | Age: 82
End: 2022-03-07
Payer: MEDICARE

## 2022-03-07 VITALS
BODY MASS INDEX: 32.2 KG/M2 | HEART RATE: 69 BPM | RESPIRATION RATE: 16 BRPM | OXYGEN SATURATION: 96 % | WEIGHT: 175 LBS | HEIGHT: 62 IN | SYSTOLIC BLOOD PRESSURE: 124 MMHG | DIASTOLIC BLOOD PRESSURE: 70 MMHG

## 2022-03-07 DIAGNOSIS — M54.31 BILATERAL SCIATICA: ICD-10-CM

## 2022-03-07 DIAGNOSIS — M54.32 BILATERAL SCIATICA: ICD-10-CM

## 2022-03-07 DIAGNOSIS — Z00.00 MEDICARE ANNUAL WELLNESS VISIT, SUBSEQUENT: Primary | ICD-10-CM

## 2022-03-07 PROCEDURE — G0439 PPPS, SUBSEQ VISIT: HCPCS | Performed by: FAMILY MEDICINE

## 2022-03-07 PROCEDURE — 99213 OFFICE O/P EST LOW 20 MIN: CPT | Performed by: FAMILY MEDICINE

## 2022-03-07 RX ORDER — MUPIROCIN CALCIUM 20 MG/G
CREAM TOPICAL 3 TIMES DAILY
COMMUNITY
End: 2022-03-07

## 2022-03-07 RX ORDER — MUPIROCIN CALCIUM 20 MG/G
1 CREAM TOPICAL 2 TIMES DAILY
Qty: 30 G | Refills: 2 | Status: SHIPPED | OUTPATIENT
Start: 2022-03-07

## 2022-03-07 NOTE — TELEPHONE ENCOUNTER
----- Message from Yue Simmons MD sent at 3/7/2022 10:47 AM CST -----  Vit. D 2000U a day, if already on supplement 5000U a day for next 3-4 months.

## 2022-03-07 NOTE — PATIENT INSTRUCTIONS
Meena PEREZ  [facility]      [address] [facility] [id number]  [facility], [address]      Phone: [phone]

## 2022-03-10 ENCOUNTER — TELEPHONE (OUTPATIENT)
Dept: FAMILY MEDICINE CLINIC | Facility: CLINIC | Age: 82
End: 2022-03-10

## 2022-03-10 NOTE — TELEPHONE ENCOUNTER
Pharmacy called to see if Mupirocin Calcium 2 % External Cream  Can be changed to an ointment. Cream is over $300 for patient.

## 2022-05-06 ENCOUNTER — TELEPHONE (OUTPATIENT)
Dept: FAMILY MEDICINE CLINIC | Facility: CLINIC | Age: 82
End: 2022-05-06

## 2022-05-06 NOTE — TELEPHONE ENCOUNTER
Patients daughter called, she states that patient was given instruction on 3/7/22 by Dr. Rehan Gandhi to see [facility] but she does not have a referral.    Please Advise. Thank you.

## 2022-05-06 NOTE — TELEPHONE ENCOUNTER
Bilateral sciatica: referral to pain clinic.   Referral placed and pt's daughter Jennifer Walker notified

## 2022-05-11 ENCOUNTER — TELEPHONE (OUTPATIENT)
Dept: FAMILY MEDICINE CLINIC | Facility: CLINIC | Age: 82
End: 2022-05-11

## 2022-05-11 NOTE — TELEPHONE ENCOUNTER
----- Message from Summer Lin MD sent at 5/11/2022  3:05 PM CDT -----  Please put DM labs including urine micro needs to do labs this month.

## 2022-05-12 ENCOUNTER — OFFICE VISIT (OUTPATIENT)
Dept: PAIN CLINIC | Facility: CLINIC | Age: 82
End: 2022-05-12
Payer: MEDICARE

## 2022-05-12 ENCOUNTER — HOSPITAL ENCOUNTER (OUTPATIENT)
Dept: GENERAL RADIOLOGY | Facility: HOSPITAL | Age: 82
Discharge: HOME | End: 2022-05-12
Attending: PHYSICIAN ASSISTANT
Payer: MEDICARE

## 2022-05-12 ENCOUNTER — MED REC SCAN ONLY (OUTPATIENT)
Dept: FAMILY MEDICINE CLINIC | Facility: CLINIC | Age: 82
End: 2022-05-12

## 2022-05-12 VITALS
DIASTOLIC BLOOD PRESSURE: 64 MMHG | WEIGHT: 175 LBS | OXYGEN SATURATION: 98 % | SYSTOLIC BLOOD PRESSURE: 112 MMHG | BODY MASS INDEX: 32 KG/M2 | HEART RATE: 62 BPM

## 2022-05-12 DIAGNOSIS — M48.062 SPINAL STENOSIS OF LUMBAR REGION WITH NEUROGENIC CLAUDICATION: Primary | ICD-10-CM

## 2022-05-12 DIAGNOSIS — M48.062 SPINAL STENOSIS OF LUMBAR REGION WITH NEUROGENIC CLAUDICATION: ICD-10-CM

## 2022-05-12 PROCEDURE — 99204 OFFICE O/P NEW MOD 45 MIN: CPT | Performed by: PHYSICIAN ASSISTANT

## 2022-05-12 PROCEDURE — 72114 X-RAY EXAM L-S SPINE BENDING: CPT | Performed by: PHYSICIAN ASSISTANT

## 2022-05-20 ENCOUNTER — HOSPITAL ENCOUNTER (OUTPATIENT)
Dept: MRI IMAGING | Facility: HOSPITAL | Age: 82
Discharge: HOME | End: 2022-05-20
Attending: PHYSICIAN ASSISTANT
Payer: MEDICARE

## 2022-05-20 DIAGNOSIS — M48.062 SPINAL STENOSIS OF LUMBAR REGION WITH NEUROGENIC CLAUDICATION: ICD-10-CM

## 2022-05-20 PROCEDURE — 72158 MRI LUMBAR SPINE W/O & W/DYE: CPT | Performed by: PHYSICIAN ASSISTANT

## 2022-05-20 PROCEDURE — A9575 INJ GADOTERATE MEGLUMI 0.1ML: HCPCS | Performed by: PHYSICIAN ASSISTANT

## 2022-05-31 ENCOUNTER — OFFICE VISIT (OUTPATIENT)
Dept: PHYSICAL THERAPY | Age: 82
End: 2022-05-31
Attending: PHYSICIAN ASSISTANT
Payer: MEDICARE

## 2022-05-31 DIAGNOSIS — M48.062 SPINAL STENOSIS OF LUMBAR REGION WITH NEUROGENIC CLAUDICATION: ICD-10-CM

## 2022-05-31 PROCEDURE — 97110 THERAPEUTIC EXERCISES: CPT

## 2022-05-31 PROCEDURE — 97162 PT EVAL MOD COMPLEX 30 MIN: CPT

## 2022-06-02 ENCOUNTER — OFFICE VISIT (OUTPATIENT)
Dept: PHYSICAL THERAPY | Age: 82
End: 2022-06-02
Attending: PHYSICIAN ASSISTANT
Payer: MEDICARE

## 2022-06-02 PROCEDURE — 97110 THERAPEUTIC EXERCISES: CPT

## 2022-06-02 NOTE — PROGRESS NOTES
Diagnosis:   Spinal Stenosis with neurogenic claudication      Referring Provider: Cait Parrish  Date of Evaluation:    5/31/2022    Precautions:  Drug Allergy, Cancer, Diabetes Next MD visit:   none scheduled  Date of Surgery: n/a   Insurance (Authorized # of Visits):   Medicare(8)                Subjective: No change in the pain, getting up from lying down and after sitting causes the most severe pain. Pain: 4/10      Objective:   Tightness of the hip flexors and quadriceps  Severe Knee OA L>R        Assessment: Assessed gait for 3.5 mins,  pt experienced R knee and hip pain        Goals:  (to be met in 8 visits)   1. Decrease lumbar spine stiffness in am to enable pt to get up from lying with referred B LE pain  2. Pt will be able to walk 1 block without assistive device  3. Increase ant thigh flexibility to decrease strain on lumbar spine and be able to perform sit to stand without LBP    Plan: Continue PT 2 x per week, flexion based  and core strengthening, ant thigh muscle stretching. Date: 6/2/2022  TX#: 2/8 Date:                 TX#: 3/ Date:                 TX#: 4/ Date:                 TX#: 5/ Date:    Tx#: 6/   Nustep x 5 min  Seated lumbar flexion stretch   Seated alternating marching x 10       Hip flexor stretch off side of bed R/L   Passive SKTC stretch R/L x 5  Supine bridges x 20       Long axis distraction R/L Gr x 2 min each  Hook lying LTR x 10        Sit to stand x 10  Gait with supervision on lower level of fitness center, 3.5 mins  R knee anterior glide mobilization Gr x 1 min  Knee flexion, extension ROM x 10         HEP:  LTR, hip flexion stretch, and forward lumbar flexion performed in the morning    Charges: EX 3   Total Timed Treatment: 45 min  Total Treatment Time: 45 min

## 2022-06-04 RX ORDER — LEVOTHYROXINE SODIUM 0.05 MG/1
TABLET ORAL
Qty: 90 TABLET | Refills: 0 | Status: SHIPPED | OUTPATIENT
Start: 2022-06-04

## 2022-06-06 ENCOUNTER — OFFICE VISIT (OUTPATIENT)
Dept: PHYSICAL THERAPY | Age: 82
End: 2022-06-06
Attending: PHYSICIAN ASSISTANT
Payer: MEDICARE

## 2022-06-06 PROCEDURE — 97110 THERAPEUTIC EXERCISES: CPT

## 2022-06-06 NOTE — PROGRESS NOTES
Diagnosis:   Spinal Stenosis with neurogenic claudication      Referring Provider: Esther  Date of Evaluation:    5/31/2022    Precautions:  Drug Allergy, Cancer, Diabetes Next MD visit:   none scheduled  Date of Surgery: n/a   Insurance (Authorized # of Visits):   Medicare(8)                Subjective: Over the weekend I had back pain after sitting for 4 hours, I could hardly walk when I got up. In the morning it takes 10 mins to decrease the sciatic pain after I get up. 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 Decrease lumbar spine stiffness in am to enable pt to get up from lying with referred B LE pain  2. Pt will be able to walk 1 block without assistive device  3. Increase ant thigh flexibility to decrease strain on lumbar spine and be able to perform sit to stand without LBP    Plan: Continue PT 2 x per week, flexion based  and core strengthening, ant thigh muscle stretching. Date: 6/2/2022  TX#: 2/8 Date:6/6/2022                 TX#: 3/8 Date:                 TX#: 4/ Date:                 TX#: 5/ Date:    Tx#: 6/   Nustep x 5 min  Seated lumbar flexion stretch   Seated alternating marching x 10 Gait with supervision on lower level of fitness center 5 mins using 1 cane  Seated with hands on SB lumbar spine flexion stretch roll out x 20      Hip flexor stretch off side of bed R/L   Passive SKTC stretch R/L x 5  Supine bridges x 20 Seated march with abd bracing R/L x 10  Seated LAQ R/L x 10      Long axis distraction R/L Gr x 2 min each  Hook lying LTR x 10  Supine SKTC stretch R/L 6 x 10 sec hold  Hook lying bridging x 20  Hook lying LTR x 20  Hook lying bent leg march x 20      Sit to stand x 10  Gait with supervision on lower level of fitness center, 3.5 mins  R knee anterior glide mobilization Gr x 1 min  Knee flexion, extension ROM x 10   Supine hip flexor stretch over edge of table R/L 3 x 20 sec hold  Supine long axis distraction Gr 3 single leg pull 6 x 10 sec hold  Sit to stand x 10      HEP:  LTR, hip flexion stretch, and forward lumbar flexion performed in the morning    Charges: EX 3   Total Timed Treatment: 45 min  Total Treatment Time: 45 min

## 2022-06-09 ENCOUNTER — OFFICE VISIT (OUTPATIENT)
Dept: PHYSICAL THERAPY | Age: 82
End: 2022-06-09
Attending: PHYSICIAN ASSISTANT
Payer: MEDICARE

## 2022-06-09 PROCEDURE — 97110 THERAPEUTIC EXERCISES: CPT

## 2022-06-09 NOTE — PROGRESS NOTES
Diagnosis:   Spinal Stenosis with neurogenic claudication      Referring Provider: Esther  Date of Evaluation:    5/31/2022    Precautions:  Drug Allergy, Cancer, Diabetes Next MD visit:   none scheduled  Date of Surgery: n/a   Insurance (Authorized # of Visits):   Medicare(8)                Subjective: Sciatic pain is still bothering me in the morning when I get up and also after I sit for a while (30 mins). Walked inside the house this morning for 7 mins w/o assistance. Pain: 4/10      Objective:   Standing with slightly flexed posture at the hips and knees. Moderate tightness of the hip flexors and quadriceps  Severe Knee OA R>L        Assessment: Pt was able to ambulate with cane for 5 mins, pt experienced low back pain    Goals:  (to be met in 8 visits)   1. Decrease lumbar spine stiffness in am to enable pt to get up from lying with referred B LE pain  2. Pt will be able to walk 1 block without assistive device  3. Increase ant thigh flexibility to decrease strain on lumbar spine and be able to perform sit to stand without LBP    Plan: Continue PT 2 x per week, flexion based  and core strengthening, ant thigh muscle stretching. Date: 6/2/2022  TX#: 2/8 Date:6/6/2022                 TX#: 3/8 Date:6/9/2022                 TX#: 4/8 Date:                 TX#: 5/ Date:    Tx#: 6/   Nustep x 5 min  Seated lumbar flexion stretch   Seated alternating marching x 10 Gait with supervision on lower level of fitness center 5 mins using 1 cane  Seated with hands on SB lumbar spine flexion stretch roll out x 20 Nu step level 4 5 mins  Gait with supervision 1 lap on lower level of fitness center using cane  Seated with hands on SB lumbar flexion stretch x 20  Supine passive knee extension stretch R/L     Hip flexor stretch off side of bed R/L   Passive SKTC stretch R/L x 5  Supine bridges x 20 Seated march with abd bracing R/L x 10  Seated LAQ R/L x 10 Hook lying bridging x 20  LTR x 20  Prone lying quads stretch R/L 3 x 10 sec hold     Long axis distraction R/L Gr x 2 min each  Hook lying LTR x 10  Supine SKTC stretch R/L 6 x 10 sec hold  Hook lying bridging x 20  Hook lying LTR x 20  Hook lying bent leg march x 20 Hook lying bent leg lift with hold R/L 10 x 3 sec    Supine hip long axis distraction R/L Gr 3 6 x 10 sec hold     Sit to stand x 10  Gait with supervision on lower level of fitness center, 3.5 mins  R knee anterior glide mobilization Gr x 1 min  Knee flexion, extension ROM x 10   Supine hip flexor stretch over edge of table R/L 3 x 20 sec hold  Supine long axis distraction Gr 3 single leg pull 6 x 10 sec hold  Sit to stand x 10 Seated LAQ R/L x 10 each  Sit to stand x 12     HEP:  LTR, hip flexion stretch, and forward lumbar flexion performed in the morning    Charges: EX 3   Total Timed Treatment: 45 min  Total Treatment Time: 45 min

## 2022-06-14 ENCOUNTER — PATIENT MESSAGE (OUTPATIENT)
Dept: FAMILY MEDICINE CLINIC | Facility: CLINIC | Age: 82
End: 2022-06-14

## 2022-06-14 ENCOUNTER — OFFICE VISIT (OUTPATIENT)
Dept: PHYSICAL THERAPY | Age: 82
End: 2022-06-14
Attending: PHYSICIAN ASSISTANT
Payer: MEDICARE

## 2022-06-14 PROCEDURE — 97110 THERAPEUTIC EXERCISES: CPT

## 2022-06-14 NOTE — TELEPHONE ENCOUNTER
From: Juana Seay  To: Fabrizio Finley MD  Sent: 6/14/2022 2:25 PM CDT  Subject: Therapy    Dr. Leslie Lanier from back/sciatic therapy thinks I should have a rolator when working in the yard. Can you send a script for that?

## 2022-06-14 NOTE — PROGRESS NOTES
Diagnosis:   Spinal Stenosis with neurogenic claudication      Referring Provider: Esther  Date of Evaluation:    5/31/2022    Precautions:  Drug Allergy, Cancer, Diabetes Next MD visit:   none scheduled  Date of Surgery: n/a   Insurance (Authorized # of Visits):   Medicare(8)                Subjective: Sciatic pain is still bothering me in the morning when I get up and also after I sit for a while (30 mins). Pain: 4/10      Objective:   Standing with slightly flexed posture at the hips and knees. Moderate tightness of the hip flexors and quadriceps  Severe Knee OA R>L        Assessment: Pt was able to ambulate with cane for 5 mins, pt experienced low back pain    Goals:  (to be met in 8 visits)   1. Decrease lumbar spine stiffness in am to enable pt to get up from lying with referred B LE pain  2. Pt will be able to walk 1 block without assistive device  3. Increase ant thigh flexibility to decrease strain on lumbar spine and be able to perform sit to stand without LBP    Plan: Continue PT 2 x per week, flexion based  and core strengthening, ant thigh muscle stretching. Date: 6/2/2022  TX#: 2/8 Date:6/6/2022                 TX#: 3/8 Date:6/9/2022                 TX#: 4/8 Date: 6/14/2022                 TX#: 5/8 Date:    Tx#: 6/   Nustep x 5 min  Seated lumbar flexion stretch   Seated alternating marching x 10 Gait with supervision on lower level of fitness center 5 mins using 1 cane  Seated with hands on SB lumbar spine flexion stretch roll out x 20 Nu step level 4 5 mins  Gait with supervision 1 lap on lower level of fitness center using cane  Seated with hands on SB lumbar flexion stretch x 20  Supine passive knee extension stretch R/L Nu step level 4 5 mins  Gait with supervision 1 lap of fitness center using cane    x 20    Supine passive knee flex/ext R/L x 10    Hip flexor stretch off side of bed R/L   Passive SKTC stretch R/L x 5  Supine bridges x 20 Seated march with abd bracing R/L x 10  Seated LAQ R/L x 10 Hook lying bridging x 20  LTR x 20  Prone lying quads stretch R/L 3 x 10 sec hold  x 20   x 20  3 x 10 sec hold  Supine hip flexor stretch over edge of bed R/L 3 x 10 sec hold    Long axis distraction R/L Gr x 2 min each  Hook lying LTR x 10  Supine SKTC stretch R/L 6 x 10 sec hold  Hook lying bridging x 20  Hook lying LTR x 20  Hook lying bent leg march x 20 Hook lying bent leg lift with hold R/L 10 x 3 sec    Supine hip long axis distraction R/L Gr 3 6 x 10 sec hold Hook lying bent leg lift R/L x 20  Bent leg fallout R/L x 10  Supine manual distraction long axis R/L 6 x 10 sec hold  Side lying L lumbar rotation mobilizations Gr 3 2 mins    Sit to stand x 10  Gait with supervision on lower level of fitness center, 3.5 mins  R knee anterior glide mobilization Gr x 1 min  Knee flexion, extension ROM x 10   Supine hip flexor stretch over edge of table R/L 3 x 20 sec hold  Supine long axis distraction Gr 3 single leg pull 6 x 10 sec hold  Sit to stand x 10 Seated LAQ R/L x 10 each  Sit to stand x 12 Seated LAQ R/L x 20  Sit to stand x 10    HEP:  LTR, hip flexion stretch, and forward lumbar flexion performed in the morning    Charges: EX 3   Total Timed Treatment: 45 min  Total Treatment Time: 45 min

## 2022-06-16 ENCOUNTER — OFFICE VISIT (OUTPATIENT)
Dept: PHYSICAL THERAPY | Age: 82
End: 2022-06-16
Attending: PHYSICIAN ASSISTANT
Payer: MEDICARE

## 2022-06-16 PROCEDURE — 97140 MANUAL THERAPY 1/> REGIONS: CPT

## 2022-06-16 PROCEDURE — 97110 THERAPEUTIC EXERCISES: CPT

## 2022-06-16 NOTE — PROGRESS NOTES
Diagnosis:   Spinal Stenosis with neurogenic claudication      Referring Provider: Esther  Date of Evaluation:    5/31/2022    Precautions:  Drug Allergy, Cancer, Diabetes Next MD visit:   none scheduled  Date of Surgery: n/a   Insurance (Authorized # of Visits):   Medicare(8)                Subjective: Sciatic pain is still bothering me in the morning when I get up and also after I sit for a while (30 mins). Pain: 4/10      Objective:   Standing with slightly flexed posture at the hips and knees. Moderate tightness of the hip flexors and quadriceps  Severe Knee OA R>L        Assessment: Pt continues to experience low back pain and bilateral post thigh pain when getting up from sitting and when walking. Pt is ambulating with a st cane, she has an order for a Rolator for outdoor walking. Goals:  (to be met in 8 visits)   1. Decrease lumbar spine stiffness in am to enable pt to get up from lying with referred B LE pain  2. Pt will be able to walk 1 block without assistive device  3. Increase ant thigh flexibility to decrease strain on lumbar spine and be able to perform sit to stand without LBP    Plan: Continue PT 2 x per week, flexion based  and core strengthening, ant thigh muscle stretching. Date: 6/2/2022  TX#: 2/8 Date:6/6/2022                 TX#: 3/8 Date:6/9/2022                 TX#: 4/8 Date: 6/14/2022                 TX#: 5/8 Date:    Tx#: 6/   Nustep x 5 min  Seated lumbar flexion stretch   Seated alternating marching x 10 Gait with supervision on lower level of fitness center 5 mins using 1 cane  Seated with hands on SB lumbar spine flexion stretch roll out x 20 Nu step level 4 5 mins  Gait with supervision 1 lap on lower level of fitness center using cane  Seated with hands on SB lumbar flexion stretch x 20  Supine passive knee extension stretch R/L Nu step level 4 5 mins  Gait with supervision 1 lap of fitness center using cane    x 20    Supine passive knee flex/ext R/L x 10    Hip flexor stretch off side of bed R/L   Passive SKTC stretch R/L x 5  Supine bridges x 20 Seated march with abd bracing R/L x 10  Seated LAQ R/L x 10 Hook lying bridging x 20  LTR x 20  Prone lying quads stretch R/L 3 x 10 sec hold  x 20   x 20  3 x 10 sec hold  Supine hip flexor stretch over edge of bed R/L 3 x 10 sec hold    Long axis distraction R/L Gr x 2 min each  Hook lying LTR x 10  Supine SKTC stretch R/L 6 x 10 sec hold  Hook lying bridging x 20  Hook lying LTR x 20  Hook lying bent leg march x 20 Hook lying bent leg lift with hold R/L 10 x 3 sec    Supine hip long axis distraction R/L Gr 3 6 x 10 sec hold Hook lying bent leg lift R/L x 20  Bent leg fallout R/L x 10  Supine manual distraction long axis R/L 6 x 10 sec hold  Side lying L lumbar rotation mobilizations Gr 3 2 mins    Sit to stand x 10  Gait with supervision on lower level of fitness center, 3.5 mins  R knee anterior glide mobilization Gr x 1 min  Knee flexion, extension ROM x 10   Supine hip flexor stretch over edge of table R/L 3 x 20 sec hold  Supine long axis distraction Gr 3 single leg pull 6 x 10 sec hold  Sit to stand x 10 Seated LAQ R/L x 10 each  Sit to stand x 12 Seated LAQ R/L x 20  Sit to stand x 10    HEP:  LTR, hip flexion stretch, and forward lumbar flexion performed in the morning    Charges: EX 2, MT 1   Total Timed Treatment: 45 min  Total Treatment Time: 45 min

## 2022-06-21 ENCOUNTER — OFFICE VISIT (OUTPATIENT)
Dept: PHYSICAL THERAPY | Age: 82
End: 2022-06-21
Attending: PHYSICIAN ASSISTANT
Payer: MEDICARE

## 2022-06-21 ENCOUNTER — TELEPHONE (OUTPATIENT)
Dept: PHYSICAL THERAPY | Facility: HOSPITAL | Age: 82
End: 2022-06-21

## 2022-06-21 PROCEDURE — 97110 THERAPEUTIC EXERCISES: CPT

## 2022-06-21 PROCEDURE — 97140 MANUAL THERAPY 1/> REGIONS: CPT

## 2022-06-21 NOTE — PROGRESS NOTES
Diagnosis:   Spinal Stenosis with neurogenic claudication      Referring Provider: Esther  Date of Evaluation:    5/31/2022    Precautions:  Drug Allergy, Cancer, Diabetes Next MD visit:   none scheduled  Date of Surgery: n/a   Insurance (Authorized # of Visits):   Medicare(12)                Subjective: Sciatic pain is still bothering me in the morning when I get up and also after I sit for a while (30 mins). The stretching exercises do provide some relief but so far there hasn't been a lot of improvement that lasts. Pain: 0-4/10      Objective:   Lumbar spine ROM flexion reach to floor, extension unable to stand upright, side flexion moderate loss, rotation moderate loss  Standing with slightly flexed posture at the hips and knees. Moderate tightness of the hip flexors and quadriceps  Severe Knee OA R>L, varus/flexion deformity bilaterally      Assessment[de-identified] Pt had less pain with sustained hold flexion in sitting, she continues to experience low back pain and bilateral post thigh pain when getting up from sitting and when walking. Pt is ambulating with a st cane, she has an order for a Rolator for outdoor walking. Goals:  (to be met in 12 visits)   1. Decrease lumbar spine stiffness in am to enable pt to get up from lying with referred B LE pain  2. Pt will be able to walk 1 block without assistive device- not met  3. Increase ant thigh flexibility to decrease strain on lumbar spine and be able to perform sit to stand without LBP- not met    Plan: Continue PT 2 x per week, flexion based  and core strengthening, ant thigh muscle stretching.    Date: 6/2/2022  TX#: 2/8 Date:6/6/2022                 TX#: 3/8 Date:6/9/2022                 TX#: 4/8 Date: 6/14/2022                 TX#: 5/8 Date: 6/21/2022  Tx#: 6/8   Nustep x 5 min  Seated lumbar flexion stretch   Seated alternating marching x 10 Gait with supervision on lower level of fitness center 5 mins using 1 cane  Seated with hands on SB lumbar spine flexion stretch roll out x 20 Nu step level 4 5 mins  Gait with supervision 1 lap on lower level of fitness center using cane  Seated with hands on SB lumbar flexion stretch x 20  Supine passive knee extension stretch R/L Nu step level 4 5 mins  Gait with supervision 1 lap of fitness center using cane    x 20    Supine passive knee flex/ext R/L x 10 Nu step level 4 5 mins  Supine SKTC stretch R/L 3 x 10 sec hold  Hook lying LTR x 20  Supine with legs on SB DKTC rolls x 20  Mini bridge with legs on SB x 20   Hip flexor stretch off side of bed R/L   Passive SKTC stretch R/L x 5  Supine bridges x 20 Seated march with abd bracing R/L x 10  Seated LAQ R/L x 10 Hook lying bridging x 20  LTR x 20  Prone lying quads stretch R/L 3 x 10 sec hold  x 20   x 20  3 x 10 sec hold  Supine hip flexor stretch over edge of bed R/L 3 x 10 sec hold Side lying STM lumbar paraspinal muscles and across lower lumbar spine 5 mins  Gr 2 lumbar rotation 30 secs x 3 R/L  Manual hip flexor stretch R/L 3 x 20 sec hold   Long axis distraction R/L Gr x 2 min each  Hook lying LTR x 10  Supine SKTC stretch R/L 6 x 10 sec hold  Hook lying bridging x 20  Hook lying LTR x 20  Hook lying bent leg march x 20 Hook lying bent leg lift with hold R/L 10 x 3 sec    Supine hip long axis distraction R/L Gr 3 6 x 10 sec hold Hook lying bent leg lift R/L x 20  Bent leg fallout R/L x 10  Supine manual distraction long axis R/L 6 x 10 sec hold  Side lying L lumbar rotation mobilizations Gr 3 2 mins Side lying hip abd with PT assist R/L x 10  Hook lying bent leg lift R/l x 10  Bent leg fallout R/L x 10  Hip flexor/quads stretch over edge of table R/L 3 x 10 sec hold   Sit to stand x 10  Gait with supervision on lower level of fitness center, 3.5 mins  R knee anterior glide mobilization Gr x 1 min  Knee flexion, extension ROM x 10   Supine hip flexor stretch over edge of table R/L 3 x 20 sec hold  Supine long axis distraction Gr 3 single leg pull 6 x 10 sec hold  Sit to stand x 10 Seated LAQ R/L x 10 each  Sit to stand x 12 Seated LAQ R/L x 20  Sit to stand x 10 Seated LAQ R/L x 20  Seated lumbar flexion stretch 3 x 10 sec hold  Sit to stand x 10   HEP:  LTR x 20, hip flexion stretch R/L x 3, seated lumbar flexion 10 sec hold 3-6 reps    Charges: EX 2, MT 1   Total Timed Treatment: 45 min  Total Treatment Time: 45 min

## 2022-06-23 ENCOUNTER — OFFICE VISIT (OUTPATIENT)
Dept: PHYSICAL THERAPY | Age: 82
End: 2022-06-23
Attending: PHYSICIAN ASSISTANT
Payer: MEDICARE

## 2022-06-23 PROCEDURE — 97140 MANUAL THERAPY 1/> REGIONS: CPT

## 2022-06-23 PROCEDURE — 97110 THERAPEUTIC EXERCISES: CPT

## 2022-06-23 NOTE — PROGRESS NOTES
Diagnosis:   Spinal Stenosis with neurogenic claudication      Referring Provider: Esther  Date of Evaluation:    5/31/2022    Precautions:  Drug Allergy, Cancer, Diabetes Next MD visit:   none scheduled  Date of Surgery: n/a   Insurance (Authorized # of Visits):   Medicare(12)                Subjective: Sciatic pain is still bothering me in the morning when I get up and also after I sit for a while (30 mins). The stretching exercises do provide some relief but so far there hasn't been a lot of improvement that lasts. Pain: 0-4/10      Objective:   Lumbar spine ROM flexion reach to floor, extension unable to stand upright, side flexion moderate loss, rotation moderate loss  Standing with slightly flexed posture at the hips and knees. Moderate tightness of the hip flexors and quadriceps  Severe Knee OA R>L, varus/flexion deformity bilaterally      Assessment[de-identified] Pt had less pain with sustained hold flexion in sitting, she continues to experience low back pain and bilateral post thigh pain when getting up from sitting and when walking. Pt is ambulating with a st cane, she has an order for a Rolator for outdoor walking. Goals:  (to be met in 12 visits)   1. Decrease lumbar spine stiffness in am to enable pt to get up from lying with referred B LE pain  2. Pt will be able to walk 1 block without assistive device- not met  3. Increase ant thigh flexibility to decrease strain on lumbar spine and be able to perform sit to stand without LBP- not met    Plan: Continue PT 2 x per week, total 12 visits, flexion based  and core strengthening, ant thigh muscle stretching.    Date: 6/2/2022  TX#: 2/8 Date:6/6/2022                 TX#: 3/8 Date:6/9/2022                 TX#: 4/8 Date: 6/14/2022                 TX#: 5/8 Date: 6/21/2022  Tx#: 6/8 Date: 6/23/2022  Tx#: 7/8   Nustep x 5 min  Seated lumbar flexion stretch   Seated alternating marching x 10 Gait with supervision on lower level of fitness center 5 mins using 1 cane  Seated with hands on SB lumbar spine flexion stretch roll out x 20 Nu step level 4 5 mins  Gait with supervision 1 lap on lower level of fitness center using cane  Seated with hands on SB lumbar flexion stretch x 20  Supine passive knee extension stretch R/L Nu step level 4 5 mins  Gait with supervision 1 lap of fitness center using cane    x 20    Supine passive knee flex/ext R/L x 10 Nu step level 4 5 mins  Supine SKTC stretch R/L 3 x 10 sec hold  Hook lying LTR x 20  Supine with legs on SB DKTC rolls x 20  Mini bridge with legs on SB x 20 Nu step level 5 5 mins  Seated lumbar flexion stretch 3 x 10 sec hold  Seated LAQ R/L x 20  Supine with heels on SB DKTC rolls x 20  LTR with legs on SB x 20  Bridge with legs on SB x 20   Hip flexor stretch off side of bed R/L   Passive SKTC stretch R/L x 5  Supine bridges x 20 Seated march with abd bracing R/L x 10  Seated LAQ R/L x 10 Hook lying bridging x 20  LTR x 20  Prone lying quads stretch R/L 3 x 10 sec hold  x 20   x 20  3 x 10 sec hold  Supine hip flexor stretch over edge of bed R/L 3 x 10 sec hold Side lying STM lumbar paraspinal muscles and across lower lumbar spine 5 mins  Gr 2 lumbar rotation 30 secs x 3 R/L  Manual hip flexor stretch R/L 3 x 20 sec hold Hook lying bent leg lift R/L x 20  Bent leg fallout R/L x 20  Supine hip flexor stretch over edge of table R/L    Long axis distraction R/L Gr x 2 min each  Hook lying LTR x 10  Supine SKTC stretch R/L 6 x 10 sec hold  Hook lying bridging x 20  Hook lying LTR x 20  Hook lying bent leg march x 20 Hook lying bent leg lift with hold R/L 10 x 3 sec    Supine hip long axis distraction R/L Gr 3 6 x 10 sec hold Hook lying bent leg lift R/L x 20  Bent leg fallout R/L x 10  Supine manual distraction long axis R/L 6 x 10 sec hold  Side lying L lumbar rotation mobilizations Gr 3 2 mins Side lying hip abd with PT assist R/L x 10  Hook lying bent leg lift R/l x 10  Bent leg fallout R/L x 10  Hip flexor/quads stretch over edge of table R/L 3 x 10 sec hold Side lying lumbar spine rotation mobilizations Gr 2 R/L 2 mins each  STM lumbar spine % mins  Seated LAQ R/L x 20  Seated lumbar flexion stretch 3 x 10 sec hold   Sit to stand x 10  Gait with supervision on lower level of fitness center, 3.5 mins  R knee anterior glide mobilization Gr x 1 min  Knee flexion, extension ROM x 10   Supine hip flexor stretch over edge of table R/L 3 x 20 sec hold  Supine long axis distraction Gr 3 single leg pull 6 x 10 sec hold  Sit to stand x 10 Seated LAQ R/L x 10 each  Sit to stand x 12 Seated LAQ R/L x 20  Sit to stand x 10 Seated LAQ R/L x 20  Seated lumbar flexion stretch 3 x 10 sec hold  Sit to stand x 10 Sit to stand x 10 without use of hands   HEP:  LTR x 20, hip flexion stretch R/L x 3, seated lumbar flexion 10 sec hold 3-6 reps    Charges: EX 2, MT 1   Total Timed Treatment: 45 min  Total Treatment Time: 45 min

## 2022-06-28 ENCOUNTER — OFFICE VISIT (OUTPATIENT)
Dept: PHYSICAL THERAPY | Age: 82
End: 2022-06-28
Attending: PHYSICIAN ASSISTANT
Payer: MEDICARE

## 2022-06-28 PROCEDURE — 97110 THERAPEUTIC EXERCISES: CPT

## 2022-06-28 PROCEDURE — 97140 MANUAL THERAPY 1/> REGIONS: CPT

## 2022-06-28 NOTE — PROGRESS NOTES
Diagnosis:   Spinal Stenosis with neurogenic claudication      Referring Provider: Esther  Date of Evaluation:    5/31/2022    Precautions:  Drug Allergy, Cancer, Diabetes Next MD visit:   none scheduled  Date of Surgery: n/a   Insurance (Authorized # of Visits):   Medicare(12)                Subjective: Sciatic pain is getting worse when I get up from sitting and I try to walk it is really hard. The stretching exercises do provide some relief but so far there hasn't been a lot of improvement that lasts. Pain: 0-4/10      Objective:   Lumbar spine ROM flexion reach to floor, extension unable to stand upright, side flexion moderate loss, rotation moderate loss  Standing with slightly flexed posture at the hips and knees. Moderate tightness of the hip flexors and quadriceps  Severe Knee OA R>L, varus/flexion deformity bilaterally      Assessment[de-identified] Pt had less pain with sustained hold flexion in sitting, she continues to experience low back pain and bilateral post thigh pain when getting up from sitting and when walking. Pt is ambulating with a st cane, she has an order for a Rolator for outdoor walking. Goals:  (to be met in 12 visits)   1. Decrease lumbar spine stiffness in am to enable pt to get up from lying with referred B LE pain  2. Pt will be able to walk 1 block without assistive device- not met  3. Increase ant thigh flexibility to decrease strain on lumbar spine and be able to perform sit to stand without LBP- not met    Plan: Continue PT 2 x per week, total 12 visits, flexion based  and core strengthening, ant thigh muscle stretching.    Date: 6/2/2022  TX#: 2/8 Date:6/6/2022                 TX#: 3/8 Date:6/9/2022                 TX#: 4/8 Date: 6/14/2022                 TX#: 5/8 Date: 6/21/2022  Tx#: 6/8 Date: 6/23/2022  Tx#: 7/8 Date: 6/28/2022  Tx#: 8/8   Nustep x 5 min  Seated lumbar flexion stretch   Seated alternating marching x 10 Gait with supervision on lower level of fitness center 5 mins using 1 cane  Seated with hands on SB lumbar spine flexion stretch roll out x 20 Nu step level 4 5 mins  Gait with supervision 1 lap on lower level of fitness center using cane  Seated with hands on SB lumbar flexion stretch x 20  Supine passive knee extension stretch R/L Nu step level 4 5 mins  Gait with supervision 1 lap of fitness center using cane    x 20    Supine passive knee flex/ext R/L x 10 Nu step level 4 5 mins  Supine SKTC stretch R/L 3 x 10 sec hold  Hook lying LTR x 20  Supine with legs on SB DKTC rolls x 20  Mini bridge with legs on SB x 20 Nu step level 5 5 mins  Seated lumbar flexion stretch 3 x 10 sec hold  Seated LAQ R/L x 20  Supine with heels on SB DKTC rolls x 20  LTR with legs on SB x 20  Bridge with legs on SB x 20 Nu step level 5 6 mins  6 x 10 sec hold                                           X 20   x 20                                           X 20     x 20     Hip flexor stretch off side of bed R/L   Passive SKTC stretch R/L x 5  Supine bridges x 20 Seated march with abd bracing R/L x 10  Seated LAQ R/L x 10 Hook lying bridging x 20  LTR x 20  Prone lying quads stretch R/L 3 x 10 sec hold  x 20   x 20  3 x 10 sec hold  Supine hip flexor stretch over edge of bed R/L 3 x 10 sec hold Side lying STM lumbar paraspinal muscles and across lower lumbar spine 5 mins  Gr 2 lumbar rotation 30 secs x 3 R/L  Manual hip flexor stretch R/L 3 x 20 sec hold Hook lying bent leg lift R/L x 20  Bent leg fallout R/L x 20  Supine hip flexor stretch over edge of table R/L   x 20     x 20    3 x 20 sec hold   Long axis distraction R/L Gr x 2 min each  Hook lying LTR x 10  Supine SKTC stretch R/L 6 x 10 sec hold  Hook lying bridging x 20  Hook lying LTR x 20  Hook lying bent leg march x 20 Hook lying bent leg lift with hold R/L 10 x 3 sec    Supine hip long axis distraction R/L Gr 3 6 x 10 sec hold Hook lying bent leg lift R/L x 20  Bent leg fallout R/L x 10  Supine manual distraction long axis R/L 6 x 10 sec hold  Side lying L lumbar rotation mobilizations Gr 3 2 mins Side lying hip abd with PT assist R/L x 10  Hook lying bent leg lift R/l x 10  Bent leg fallout R/L x 10  Hip flexor/quads stretch over edge of table R/L 3 x 10 sec hold Side lying lumbar spine rotation mobilizations Gr 2 R/L 2 mins each  STM lumbar spine 5 mins  Seated LAQ R/L x 20  Seated lumbar flexion stretch 3 x 10 sec hold Side lying STM across lumbar spine and PSIS 5 mins  Gr 2-3 lumbar rotation mobilizations 2 mins each R/L  Hook lying manual piriformis stretch R/L 3 x 10 sec hold  PROM hip abd R/L x 10   Sit to stand x 10  Gait with supervision on lower level of fitness center, 3.5 mins  R knee anterior glide mobilization Gr x 1 min  Knee flexion, extension ROM x 10   Supine hip flexor stretch over edge of table R/L 3 x 20 sec hold  Supine long axis distraction Gr 3 single leg pull 6 x 10 sec hold  Sit to stand x 10 Seated LAQ R/L x 10 each  Sit to stand x 12 Seated LAQ R/L x 20  Sit to stand x 10 Seated LAQ R/L x 20  Seated lumbar flexion stretch 3 x 10 sec hold  Sit to stand x 10 Sit to stand x 10 without use of hands Manual hamstring stretch R/L 3 x 10 sec hold   HEP:  LTR x 20, hip flexion stretch R/L x 3, seated lumbar flexion 10 sec hold 3-6 reps    Charges: EX 2, MT 1   Total Timed Treatment: 45 min  Total Treatment Time: 45 min

## 2022-06-30 ENCOUNTER — HOSPITAL ENCOUNTER (EMERGENCY)
Facility: HOSPITAL | Age: 82
Discharge: HOME | End: 2022-06-30
Attending: STUDENT IN AN ORGANIZED HEALTH CARE EDUCATION/TRAINING PROGRAM
Payer: MEDICARE

## 2022-06-30 ENCOUNTER — APPOINTMENT (OUTPATIENT)
Dept: PHYSICAL THERAPY | Age: 82
End: 2022-06-30
Attending: PHYSICIAN ASSISTANT
Payer: MEDICARE

## 2022-06-30 VITALS
SYSTOLIC BLOOD PRESSURE: 150 MMHG | DIASTOLIC BLOOD PRESSURE: 70 MMHG | OXYGEN SATURATION: 95 % | HEIGHT: 62 IN | TEMPERATURE: 97 F | HEART RATE: 61 BPM | RESPIRATION RATE: 20 BRPM | WEIGHT: 180 LBS | BODY MASS INDEX: 33.13 KG/M2

## 2022-06-30 DIAGNOSIS — I10 HYPERTENSION, UNSPECIFIED TYPE: Primary | ICD-10-CM

## 2022-06-30 PROCEDURE — 99282 EMERGENCY DEPT VISIT SF MDM: CPT

## 2022-06-30 NOTE — ED INITIAL ASSESSMENT (HPI)
Pt c/o HTN @ home, highest 218/110. Pt states she took an extra 0.5 of her HTN medication and took a xanax to get BP down. Pt states while at PT, BP was retaken at 177/110. Pt states she had one episode of dizziness this AM which is why she took her BP, pt states not feeling dizzy since then.

## 2022-07-05 ENCOUNTER — OFFICE VISIT (OUTPATIENT)
Dept: PHYSICAL THERAPY | Age: 82
End: 2022-07-05
Attending: PHYSICIAN ASSISTANT
Payer: MEDICARE

## 2022-07-05 PROCEDURE — 97110 THERAPEUTIC EXERCISES: CPT

## 2022-07-05 PROCEDURE — 97140 MANUAL THERAPY 1/> REGIONS: CPT

## 2022-07-05 NOTE — PROGRESS NOTES
Diagnosis:   Spinal Stenosis with neurogenic claudication      Referring Provider: Esther  Date of Evaluation:    5/31/2022    Precautions:  Drug Allergy, Cancer, Diabetes Next MD visit:   none scheduled  Date of Surgery: n/a   Insurance (Authorized # of Visits):   Medicare(12)                Subjective: Sciatic pain is getting worse when I get up from sitting and I try to walk it is really hard. The stretching exercises do provide some relief but so far there hasn't been a lot of improvement that lasts. Pain: 0-4/10      Objective:   Lumbar spine ROM flexion reach to floor, extension unable to stand upright, side flexion moderate loss, rotation moderate loss  Standing with slightly flexed posture at the hips and knees. Moderate tightness of the hip flexors and quadriceps  Severe Knee OA R>L, varus/flexion deformity bilaterally      Assessment[de-identified] Pt reports minimal change in pain with walking. Less pain with sustained hold flexion in sitting, she continues to experience low back pain and bilateral post thigh pain when getting up from sitting and when walking. Pt is ambulating with a st cane, she does not yet have a Rolator for outdoor walking. Goals:  (to be met in 12 visits)   1. Decrease lumbar spine stiffness in am to enable pt to get up from lying without referred B LE pain  2. Pt will be able to walk 1 block without assistive device- not met  3. Increase ant thigh flexibility to decrease strain on lumbar spine and be able to perform sit to stand without LBP- not met    Plan: Continue PT x 3 visits flexion based ROM and core strengthening, ant thigh muscle stretching.    Date: 6/2/2022  TX#: 2/8 Date:6/6/2022                 TX#: 3/8 Date:6/9/2022                 TX#: 4/8 Date: 6/14/2022                 TX#: 5/8 Date: 6/21/2022  Tx#: 6/8 Date: 6/23/2022  Tx#: 7/8 Date: 6/28/2022  Tx#: 8/8 Date: 7/5/2022  Tx#: 9/12   Nustep x 5 min  Seated lumbar flexion stretch   Seated alternating marching x 10 Gait with supervision on lower level of fitness center 5 mins using 1 cane  Seated with hands on SB lumbar spine flexion stretch roll out x 20 Nu step level 4 5 mins  Gait with supervision 1 lap on lower level of fitness center using cane  Seated with hands on SB lumbar flexion stretch x 20  Supine passive knee extension stretch R/L Nu step level 4 5 mins  Gait with supervision 1 lap of fitness center using cane    x 20    Supine passive knee flex/ext R/L x 10 Nu step level 4 5 mins  Supine SKTC stretch R/L 3 x 10 sec hold  Hook lying LTR x 20  Supine with legs on SB DKTC rolls x 20  Mini bridge with legs on SB x 20 Nu step level 5 5 mins  Seated lumbar flexion stretch 3 x 10 sec hold  Seated LAQ R/L x 20  Supine with heels on SB DKTC rolls x 20  LTR with legs on SB x 20  Bridge with legs on SB x 20 Nu step level 5 6 mins  6 x 10 sec hold                                           X 20   x 20                                           X 20     x 20   Nu step level 4 5 mins  Seated with hands on SB lumbar flexion rolls x 20  Seated LAQ R/L x 10  Supine SAQ over roll R/L x 20  Manual hamstring stretch R/L 2 x 30 sec hold  Manual piriformis stretch R/L 2 x 30 sec hold   Hip flexor stretch off side of bed R/L   Passive SKTC stretch R/L x 5  Supine bridges x 20 Seated march with abd bracing R/L x 10  Seated LAQ R/L x 10 Hook lying bridging x 20  LTR x 20  Prone lying quads stretch R/L 3 x 10 sec hold  x 20   x 20  3 x 10 sec hold  Supine hip flexor stretch over edge of bed R/L 3 x 10 sec hold Side lying STM lumbar paraspinal muscles and across lower lumbar spine 5 mins  Gr 2 lumbar rotation 30 secs x 3 R/L  Manual hip flexor stretch R/L 3 x 20 sec hold Hook lying bent leg lift R/L x 20  Bent leg fallout R/L x 20  Supine hip flexor stretch over edge of table R/L   x 20     x 20    3 x 20 sec hold Hook lying abd bracing bent leg lift R/L x 20  LTR x 20  Supine single leg long axis distraction Gr 3 R/L 2 mins intermittent pull  Side lying manual hip flexor stretch R/L 3 x 20 sec hold  Side lying clam R/L x 10   Long axis distraction R/L Gr x 2 min each  Hook lying LTR x 10  Supine SKTC stretch R/L 6 x 10 sec hold  Hook lying bridging x 20  Hook lying LTR x 20  Hook lying bent leg march x 20 Hook lying bent leg lift with hold R/L 10 x 3 sec    Supine hip long axis distraction R/L Gr 3 6 x 10 sec hold Hook lying bent leg lift R/L x 20  Bent leg fallout R/L x 10  Supine manual distraction long axis R/L 6 x 10 sec hold  Side lying L lumbar rotation mobilizations Gr 3 2 mins Side lying hip abd with PT assist R/L x 10  Hook lying bent leg lift R/l x 10  Bent leg fallout R/L x 10  Hip flexor/quads stretch over edge of table R/L 3 x 10 sec hold Side lying lumbar spine rotation mobilizations Gr 2 R/L 2 mins each  STM lumbar spine 5 mins  Seated LAQ R/L x 20  Seated lumbar flexion stretch 3 x 10 sec hold Side lying STM across lumbar spine and PSIS 5 mins  Gr 2-3 lumbar rotation mobilizations 2 mins each R/L  Hook lying manual piriformis stretch R/L 3 x 10 sec hold  PROM hip abd R/L x 10 Side lying Gr 2 rotation mobilizations R/L 2 mins each  STM across lumbar spine 5 mins  Seated lumbar flexion stretch 5 x 10 sec hold  Sit to stand using hands x 10   Sit to stand x 10  Gait with supervision on lower level of fitness center, 3.5 mins  R knee anterior glide mobilization Gr x 1 min  Knee flexion, extension ROM x 10   Supine hip flexor stretch over edge of table R/L 3 x 20 sec hold  Supine long axis distraction Gr 3 single leg pull 6 x 10 sec hold  Sit to stand x 10 Seated LAQ R/L x 10 each  Sit to stand x 12 Seated LAQ R/L x 20  Sit to stand x 10 Seated LAQ R/L x 20  Seated lumbar flexion stretch 3 x 10 sec hold  Sit to stand x 10 Sit to stand x 10 without use of hands Manual hamstring stretch R/L 3 x 10 sec hold    HEP:  LTR x 20, hip flexion stretch R/L x 3, seated lumbar flexion 10 sec hold 3-6 reps    Charges: EX 2, MT 1   Total Timed Treatment: 45 min  Total Treatment Time: 45 min

## 2022-07-07 ENCOUNTER — OFFICE VISIT (OUTPATIENT)
Dept: PHYSICAL THERAPY | Age: 82
End: 2022-07-07
Attending: PHYSICIAN ASSISTANT
Payer: MEDICARE

## 2022-07-07 PROCEDURE — 97110 THERAPEUTIC EXERCISES: CPT

## 2022-07-07 PROCEDURE — 97140 MANUAL THERAPY 1/> REGIONS: CPT

## 2022-07-07 NOTE — PROGRESS NOTES
Diagnosis:   Spinal Stenosis with neurogenic claudication      Referring Provider: Esther  Date of Evaluation:    5/31/2022    Precautions:  Drug Allergy, Cancer, Diabetes Next MD visit:   none scheduled  Date of Surgery: n/a   Insurance (Authorized # of Visits):   Medicare(12)                Subjective: Low back and thigh pain on getting up from sitting and with walking. I am able to walk around the grocery staore as long as I am holding the cart. The stretching exercises do provide some relief but so far there hasn't been a lot of improvement that lasts. Pain: 0 at best, 8/10 at worst      Objective:   Lumbar spine ROM flexion reach to floor, extension unable to stand upright, side flexion moderate loss, rotation moderate loss  Standing with slightly flexed posture at the hips and knees. Moderate tightness of the hip flexors and quadriceps  Severe Knee OA R>L, varus/flexion deformity bilaterally      Assessment[de-identified] Pt reports minimal change in pain with walking. Less pain with sustained hold flexion in sitting, she continues to experience low back pain and bilateral post thigh pain when getting up from sitting and when walking. Pt is ambulating with a st cane, she does not yet have a Rolator for outdoor walking. Goals:  (to be met in 12 visits)   1. Decrease lumbar spine stiffness in am to enable pt to get up from lying without referred B LE pain  2. Pt will be able to walk 1 block without assistive device- not met  3. Increase ant thigh flexibility to decrease strain on lumbar spine and be able to perform sit to stand without LBP- not met    Plan: Continue PT x 2 visits flexion based ROM and core strengthening, ant thigh muscle stretching.    Date: 6/2/2022  TX#: 2/8 Date:6/6/2022                 TX#: 3/8 Date:6/9/2022                 TX#: 4/8 Date: 6/14/2022                 TX#: 5/8 Date: 6/21/2022  Tx#: 6/8 Date: 6/23/2022  Tx#: 7/8 Date: 6/28/2022  Tx#: 8/8 Date: 7/5/2022  Tx#: 9/12 Date: 7/7/2022  Tx#: 10/12   Nustep x 5 min  Seated lumbar flexion stretch   Seated alternating marching x 10 Gait with supervision on lower level of fitness center 5 mins using 1 cane  Seated with hands on SB lumbar spine flexion stretch roll out x 20 Nu step level 4 5 mins  Gait with supervision 1 lap on lower level of fitness center using cane  Seated with hands on SB lumbar flexion stretch x 20  Supine passive knee extension stretch R/L Nu step level 4 5 mins  Gait with supervision 1 lap of fitness center using cane    x 20    Supine passive knee flex/ext R/L x 10 Nu step level 4 5 mins  Supine SKTC stretch R/L 3 x 10 sec hold  Hook lying LTR x 20  Supine with legs on SB DKTC rolls x 20  Mini bridge with legs on SB x 20 Nu step level 5 5 mins  Seated lumbar flexion stretch 3 x 10 sec hold  Seated LAQ R/L x 20  Supine with heels on SB DKTC rolls x 20  LTR with legs on SB x 20  Bridge with legs on SB x 20 Nu step level 5 6 mins  6 x 10 sec hold                                           X 20   x 20                                           X 20     x 20   Nu step level 4 5 mins  Seated with hands on SB lumbar flexion rolls x 20  Seated LAQ R/L x 10  Supine SAQ over roll R/L x 20  Manual hamstring stretch R/L 2 x 30 sec hold  Manual piriformis stretch R/L 2 x 30 sec hold Nu step level 3 7 mins  Seated with hands on SB lumbar flexion rolls x 20  Supine SAQ over roll R/L x 10  Manual hamstring stretch R/L 30 sec hold  Manual piriformis stretch R/L 3 x 20 sec hold  Single leg distraction mobilizations Gr 3 30 secs each R/L   Hip flexor stretch off side of bed R/L   Passive SKTC stretch R/L x 5  Supine bridges x 20 Seated march with abd bracing R/L x 10  Seated LAQ R/L x 10 Hook lying bridging x 20  LTR x 20  Prone lying quads stretch R/L 3 x 10 sec hold  x 20   x 20  3 x 10 sec hold  Supine hip flexor stretch over edge of bed R/L 3 x 10 sec hold Side lying STM lumbar paraspinal muscles and across lower lumbar spine 5 mins  Gr 2 lumbar rotation 30 secs x 3 R/L  Manual hip flexor stretch R/L 3 x 20 sec hold Hook lying bent leg lift R/L x 20  Bent leg fallout R/L x 20  Supine hip flexor stretch over edge of table R/L   x 20     x 20    3 x 20 sec hold Hook lying abd bracing bent leg lift R/L x 20  LTR x 20  Supine single leg long axis distraction Gr 3 R/L 2 mins intermittent pull  Side lying manual hip flexor stretch R/L 3 x 20 sec hold  Side lying clam R/L x 10 Hook lying LTR x 20  Abd bracing bent leg lift R/L x 10  Abd bracing bent leg fallout R/L x 10  Side lying manual quads/hip flexor stretch R/L 3 x 20 sec hold  STM across lumbar spine and paraspinal muscles 5 mins   Long axis distraction R/L Gr x 2 min each  Hook lying LTR x 10  Supine SKTC stretch R/L 6 x 10 sec hold  Hook lying bridging x 20  Hook lying LTR x 20  Hook lying bent leg march x 20 Hook lying bent leg lift with hold R/L 10 x 3 sec    Supine hip long axis distraction R/L Gr 3 6 x 10 sec hold Hook lying bent leg lift R/L x 20  Bent leg fallout R/L x 10  Supine manual distraction long axis R/L 6 x 10 sec hold  Side lying L lumbar rotation mobilizations Gr 3 2 mins Side lying hip abd with PT assist R/L x 10  Hook lying bent leg lift R/l x 10  Bent leg fallout R/L x 10  Hip flexor/quads stretch over edge of table R/L 3 x 10 sec hold Side lying lumbar spine rotation mobilizations Gr 2 R/L 2 mins each  STM lumbar spine 5 mins  Seated LAQ R/L x 20  Seated lumbar flexion stretch 3 x 10 sec hold Side lying STM across lumbar spine and PSIS 5 mins  Gr 2-3 lumbar rotation mobilizations 2 mins each R/L  Hook lying manual piriformis stretch R/L 3 x 10 sec hold  PROM hip abd R/L x 10 Side lying Gr 2 rotation mobilizations R/L 2 mins each  STM across lumbar spine 5 mins  Seated lumbar flexion stretch 5 x 10 sec hold  Sit to stand using hands x 10 Side lying lumbar spine rotation mobilizations Gr 2 30 sec bouts x 3 each R/L  Seated lumbar flexion stretch 5 x 10 sec hold   Sit to stand x 10  Gait with supervision on lower level of fitness center, 3.5 mins  R knee anterior glide mobilization Gr x 1 min  Knee flexion, extension ROM x 10   Supine hip flexor stretch over edge of table R/L 3 x 20 sec hold  Supine long axis distraction Gr 3 single leg pull 6 x 10 sec hold  Sit to stand x 10 Seated LAQ R/L x 10 each  Sit to stand x 12 Seated LAQ R/L x 20  Sit to stand x 10 Seated LAQ R/L x 20  Seated lumbar flexion stretch 3 x 10 sec hold  Sit to stand x 10 Sit to stand x 10 without use of hands Manual hamstring stretch R/L 3 x 10 sec hold     HEP:  LTR x 20, hip flexion stretch R/L x 3, seated lumbar flexion 10 sec hold 3-6 reps    Charges: EX 2, MT 1   Total Timed Treatment: 45 min  Total Treatment Time: 45 min

## 2022-07-08 ENCOUNTER — OFFICE VISIT (OUTPATIENT)
Dept: PAIN CLINIC | Facility: CLINIC | Age: 82
End: 2022-07-08
Payer: MEDICARE

## 2022-07-08 ENCOUNTER — TELEPHONE (OUTPATIENT)
Dept: PAIN CLINIC | Facility: CLINIC | Age: 82
End: 2022-07-08

## 2022-07-08 VITALS
OXYGEN SATURATION: 98 % | HEIGHT: 62 IN | BODY MASS INDEX: 33.13 KG/M2 | RESPIRATION RATE: 16 BRPM | HEART RATE: 64 BPM | DIASTOLIC BLOOD PRESSURE: 70 MMHG | WEIGHT: 180 LBS | SYSTOLIC BLOOD PRESSURE: 116 MMHG

## 2022-07-08 DIAGNOSIS — M51.36 DDD (DEGENERATIVE DISC DISEASE), LUMBAR: Primary | ICD-10-CM

## 2022-07-08 DIAGNOSIS — M48.062 SPINAL STENOSIS OF LUMBAR REGION WITH NEUROGENIC CLAUDICATION: Primary | ICD-10-CM

## 2022-07-08 PROCEDURE — 99214 OFFICE O/P EST MOD 30 MIN: CPT | Performed by: PHYSICIAN ASSISTANT

## 2022-07-08 PROCEDURE — 1125F AMNT PAIN NOTED PAIN PRSNT: CPT | Performed by: PHYSICIAN ASSISTANT

## 2022-07-08 NOTE — PROGRESS NOTES
Patient presents in office today with reported pain in lower back. Pt states PT therapy isn't helping at this time.     Current pain level reported = 9/10 standing and walking    Last reported dose of Pt states none at this time      Narcotic Contract renewal no document at this time     Urine Drug screen not at this time

## 2022-07-08 NOTE — TELEPHONE ENCOUNTER
Question Answer Comment   Anesthesia Type Sedation    Provider Eufemia Gonzalez    Location Lab    Procedure Lumbar ESPERANZA consider L4/5   Medications to hold asa    Medical clearance requested (will send to Pain Navigator) No    Patient has Medicare coverage?  Yes

## 2022-07-14 ENCOUNTER — TELEPHONE (OUTPATIENT)
Dept: PHYSICAL THERAPY | Facility: HOSPITAL | Age: 82
End: 2022-07-14

## 2022-07-14 ENCOUNTER — APPOINTMENT (OUTPATIENT)
Dept: PHYSICAL THERAPY | Age: 82
End: 2022-07-14
Attending: PHYSICIAN ASSISTANT
Payer: MEDICARE

## 2022-07-14 ENCOUNTER — APPOINTMENT (OUTPATIENT)
Dept: GENERAL RADIOLOGY | Facility: HOSPITAL | Age: 82
End: 2022-07-14
Attending: ANESTHESIOLOGY
Payer: MEDICARE

## 2022-07-14 ENCOUNTER — HOSPITAL ENCOUNTER (OUTPATIENT)
Facility: HOSPITAL | Age: 82
Setting detail: HOSPITAL OUTPATIENT SURGERY
Discharge: HOME | End: 2022-07-14
Attending: ANESTHESIOLOGY | Admitting: ANESTHESIOLOGY
Payer: MEDICARE

## 2022-07-14 VITALS
BODY MASS INDEX: 33.13 KG/M2 | DIASTOLIC BLOOD PRESSURE: 73 MMHG | TEMPERATURE: 97 F | WEIGHT: 180 LBS | RESPIRATION RATE: 18 BRPM | HEIGHT: 62 IN | SYSTOLIC BLOOD PRESSURE: 129 MMHG | OXYGEN SATURATION: 95 % | HEART RATE: 56 BPM

## 2022-07-14 DIAGNOSIS — M51.36 DDD (DEGENERATIVE DISC DISEASE), LUMBAR: ICD-10-CM

## 2022-07-14 PROCEDURE — 3E0R33Z INTRODUCTION OF ANTI-INFLAMMATORY INTO SPINAL CANAL, PERCUTANEOUS APPROACH: ICD-10-PCS | Performed by: ANESTHESIOLOGY

## 2022-07-14 PROCEDURE — 62321 NJX INTERLAMINAR CRV/THRC: CPT | Performed by: ANESTHESIOLOGY

## 2022-07-14 RX ORDER — MIDAZOLAM HYDROCHLORIDE 1 MG/ML
INJECTION INTRAMUSCULAR; INTRAVENOUS
Status: DISCONTINUED | OUTPATIENT
Start: 2022-07-14 | End: 2022-07-14

## 2022-07-14 RX ORDER — DEXAMETHASONE SODIUM PHOSPHATE 10 MG/ML
INJECTION, SOLUTION INTRAMUSCULAR; INTRAVENOUS
Status: DISCONTINUED | OUTPATIENT
Start: 2022-07-14 | End: 2022-07-14

## 2022-07-14 RX ORDER — NICOTINE POLACRILEX 4 MG
30 LOZENGE BUCCAL
Status: DISCONTINUED | OUTPATIENT
Start: 2022-07-14 | End: 2022-07-14

## 2022-07-14 RX ORDER — ONDANSETRON 2 MG/ML
4 INJECTION INTRAMUSCULAR; INTRAVENOUS ONCE AS NEEDED
Status: DISCONTINUED | OUTPATIENT
Start: 2022-07-14 | End: 2022-07-14

## 2022-07-14 RX ORDER — DIPHENHYDRAMINE HYDROCHLORIDE 50 MG/ML
50 INJECTION INTRAMUSCULAR; INTRAVENOUS ONCE AS NEEDED
OUTPATIENT
Start: 2022-07-14 | End: 2022-07-14

## 2022-07-14 RX ORDER — DEXTROSE MONOHYDRATE 25 G/50ML
50 INJECTION, SOLUTION INTRAVENOUS
Status: DISCONTINUED | OUTPATIENT
Start: 2022-07-14 | End: 2022-07-14

## 2022-07-14 RX ORDER — NICOTINE POLACRILEX 4 MG
15 LOZENGE BUCCAL
Status: DISCONTINUED | OUTPATIENT
Start: 2022-07-14 | End: 2022-07-14

## 2022-07-14 RX ORDER — ONDANSETRON 2 MG/ML
4 INJECTION INTRAMUSCULAR; INTRAVENOUS ONCE AS NEEDED
OUTPATIENT
Start: 2022-07-14 | End: 2022-07-14

## 2022-07-14 RX ORDER — LIDOCAINE HYDROCHLORIDE 10 MG/ML
INJECTION, SOLUTION EPIDURAL; INFILTRATION; INTRACAUDAL; PERINEURAL
Status: DISCONTINUED | OUTPATIENT
Start: 2022-07-14 | End: 2022-07-14

## 2022-07-14 RX ORDER — INSULIN ASPART 100 [IU]/ML
3 INJECTION, SOLUTION INTRAVENOUS; SUBCUTANEOUS ONCE
Status: DISCONTINUED | OUTPATIENT
Start: 2022-07-14 | End: 2022-07-14

## 2022-07-14 RX ORDER — SODIUM CHLORIDE 9 MG/ML
INJECTION INTRAVENOUS
Status: DISCONTINUED | OUTPATIENT
Start: 2022-07-14 | End: 2022-07-14

## 2022-07-14 RX ORDER — SODIUM CHLORIDE, SODIUM LACTATE, POTASSIUM CHLORIDE, CALCIUM CHLORIDE 600; 310; 30; 20 MG/100ML; MG/100ML; MG/100ML; MG/100ML
100 INJECTION, SOLUTION INTRAVENOUS CONTINUOUS
Status: DISCONTINUED | OUTPATIENT
Start: 2022-07-14 | End: 2022-07-14

## 2022-07-14 NOTE — OPERATIVE REPORT
[facility]  Operative Report  2022     John Bermudez Patient Status:  Hospital Outpatient Surgery    1940 MRN [id number]   Location [id number] [place] [address] Attending Mitzy Kumar MD   Hosp Day # 0 PCP Ky Regalado MD     Indication: Nikhil Lovett is a 80year old female with lumbar stenosis    Preoperative Diagnosis:  DDD (degenerative disc disease), lumbar [M51.36]    Postoperative Diagnosis: Same as above. Procedure performed: LUMBAR INTERLAMINAR EPIDURAL INJECTION with sedation     Anesthesia: Local and IV Sedation. EBL: Less than 1 ml. Procedure Description:  After reviewing the patient's history and performing a focused physical examination, the diagnosis and positive previous diagnostic tests were confirmed and contraindications such as infection and coagulopathy were ruled out. Following review of allergies and potential side effects and complications, including but not necessarily limited to infection, allergic reaction, local tissue breakdown, nerve injury, post-dural puncture headache and paresis, the patient consented for the procedure. The patient was brought to the procedure room in prone position. After comprehensive monitors were applied and IV access in the patient's arm, moderate intravenous sedation was given with versed and fentanyl. Moderate sedation was given for 9 minutes. After sterile prep and lumbar spine, the L4-L5 interspace was identified with the help of fluoroscopy. Local anesthetic was given by a 25 gauge 1.5 inch needle with 1% lidocaine in that space level. Thereafter, a 20 gauge Tuohy needle was introduced and advanced under fluoroscopy. The epidural space was accessed by using loss of resistance to air technique. The needle position was confirmed with AP and lateral view under fluoroscopy. Omnipaque 180 was injected in 1 mL volume. A good epidurogram was obtained.   Thereafter, dexamethasone 10 mg with normal saline of 5 mL in total volume of 6 mL was injected through the Tuohy needle. The needle was flushed with 1 mL lidocaine. The needle was withdrawn with the stylet intact in situ. The needle's tip was intact. The patient tolerated the procedure very well without significant immediate complication. The patient's back was cleaned and sterile dressing was applied. The patient was discharged with an instruction to a responsible adult after discharge criteria were met. The patient was advised to contact us should any problems happen. The patient was also informed to go to the emergency room immediately if experiencing severe numbness or weakness in the extremities or experiencing bowel or bladder incontinence. Complications: None. Follow up: The patient was followed in the pain clinic as needed basis.           Nette Lowe MD

## 2022-07-16 DIAGNOSIS — F41.9 ANXIETY: ICD-10-CM

## 2022-07-18 RX ORDER — SERTRALINE HYDROCHLORIDE 25 MG/1
25 TABLET, FILM COATED ORAL DAILY
Qty: 30 TABLET | Refills: 0 | Status: SHIPPED | OUTPATIENT
Start: 2022-07-18

## 2022-07-27 ENCOUNTER — TELEPHONE (OUTPATIENT)
Dept: PAIN CLINIC | Facility: CLINIC | Age: 82
End: 2022-07-27

## 2022-07-27 ENCOUNTER — OFFICE VISIT (OUTPATIENT)
Dept: PAIN CLINIC | Facility: CLINIC | Age: 82
End: 2022-07-27
Payer: MEDICARE

## 2022-07-27 VITALS
BODY MASS INDEX: 33 KG/M2 | SYSTOLIC BLOOD PRESSURE: 122 MMHG | HEART RATE: 72 BPM | DIASTOLIC BLOOD PRESSURE: 84 MMHG | WEIGHT: 180 LBS | OXYGEN SATURATION: 97 %

## 2022-07-27 DIAGNOSIS — M48.062 SPINAL STENOSIS OF LUMBAR REGION WITH NEUROGENIC CLAUDICATION: Primary | ICD-10-CM

## 2022-07-27 PROCEDURE — 99214 OFFICE O/P EST MOD 30 MIN: CPT | Performed by: ANESTHESIOLOGY

## 2022-07-27 NOTE — TELEPHONE ENCOUNTER
Question Answer   Anesthesia Type Sedation   Provider Piedad Winters   Location Lab   Procedure Lumbar ESPERANZA   Medical clearance requested (will send to Pain Navigator) No   Patient has Medicare coverage?  Yes   Comments (Please list entire procedure name here.) lumbar epidural steroid injection

## 2022-07-27 NOTE — PROGRESS NOTES
Last procedure: LUMBAR INTERLAMINAR EPIDURAL INJECTION with sedation  Date: 07/14/22  Percentage of relief obtained: 90%  Duration of relief: 1 days    Current Pain Score: 8

## 2022-08-09 ENCOUNTER — APPOINTMENT (OUTPATIENT)
Dept: GENERAL RADIOLOGY | Facility: HOSPITAL | Age: 82
End: 2022-08-09
Attending: ANESTHESIOLOGY
Payer: MEDICARE

## 2022-08-09 ENCOUNTER — HOSPITAL ENCOUNTER (OUTPATIENT)
Facility: HOSPITAL | Age: 82
Setting detail: HOSPITAL OUTPATIENT SURGERY
Discharge: HOME | End: 2022-08-09
Attending: ANESTHESIOLOGY | Admitting: ANESTHESIOLOGY
Payer: MEDICARE

## 2022-08-09 VITALS
DIASTOLIC BLOOD PRESSURE: 63 MMHG | RESPIRATION RATE: 16 BRPM | OXYGEN SATURATION: 96 % | SYSTOLIC BLOOD PRESSURE: 133 MMHG | TEMPERATURE: 98 F | HEART RATE: 58 BPM

## 2022-08-09 DIAGNOSIS — M48.062 SPINAL STENOSIS OF LUMBAR REGION WITH NEUROGENIC CLAUDICATION: ICD-10-CM

## 2022-08-09 PROCEDURE — 62323 NJX INTERLAMINAR LMBR/SAC: CPT | Performed by: ANESTHESIOLOGY

## 2022-08-09 PROCEDURE — 3E0R33Z INTRODUCTION OF ANTI-INFLAMMATORY INTO SPINAL CANAL, PERCUTANEOUS APPROACH: ICD-10-PCS | Performed by: ANESTHESIOLOGY

## 2022-08-09 PROCEDURE — 3E0R3BZ INTRODUCTION OF ANESTHETIC AGENT INTO SPINAL CANAL, PERCUTANEOUS APPROACH: ICD-10-PCS | Performed by: ANESTHESIOLOGY

## 2022-08-09 RX ORDER — MIDAZOLAM HYDROCHLORIDE 1 MG/ML
INJECTION INTRAMUSCULAR; INTRAVENOUS
Status: DISCONTINUED | OUTPATIENT
Start: 2022-08-09 | End: 2022-08-09

## 2022-08-09 RX ORDER — LIDOCAINE HYDROCHLORIDE 10 MG/ML
INJECTION, SOLUTION EPIDURAL; INFILTRATION; INTRACAUDAL; PERINEURAL
Status: DISCONTINUED | OUTPATIENT
Start: 2022-08-09 | End: 2022-08-09

## 2022-08-09 RX ORDER — DEXAMETHASONE SODIUM PHOSPHATE 10 MG/ML
INJECTION, SOLUTION INTRAMUSCULAR; INTRAVENOUS
Status: DISCONTINUED | OUTPATIENT
Start: 2022-08-09 | End: 2022-08-09

## 2022-08-09 RX ORDER — ONDANSETRON 2 MG/ML
4 INJECTION INTRAMUSCULAR; INTRAVENOUS ONCE AS NEEDED
Status: DISCONTINUED | OUTPATIENT
Start: 2022-08-09 | End: 2022-08-09

## 2022-08-09 RX ORDER — SODIUM CHLORIDE, SODIUM LACTATE, POTASSIUM CHLORIDE, CALCIUM CHLORIDE 600; 310; 30; 20 MG/100ML; MG/100ML; MG/100ML; MG/100ML
100 INJECTION, SOLUTION INTRAVENOUS CONTINUOUS
Status: DISCONTINUED | OUTPATIENT
Start: 2022-08-09 | End: 2022-08-09

## 2022-08-09 RX ORDER — DIPHENHYDRAMINE HYDROCHLORIDE 50 MG/ML
50 INJECTION INTRAMUSCULAR; INTRAVENOUS ONCE AS NEEDED
Status: DISCONTINUED | OUTPATIENT
Start: 2022-08-09 | End: 2022-08-09

## 2022-08-09 RX ORDER — SODIUM CHLORIDE 9 MG/ML
INJECTION INTRAVENOUS
Status: DISCONTINUED | OUTPATIENT
Start: 2022-08-09 | End: 2022-08-09

## 2022-08-09 NOTE — OPERATIVE REPORT
[facility]  Operative Report  2022     Mahendra Grimes Patient Status:  Hospital Outpatient Surgery    1940 MRN [id number]   Location [address] [address] Attending Ramsey Dowling MD   Hosp Day # 0 PCP Ahmet [place] MD     Indication: Niru Crespo is a 80year old female with spinal stenosis    Imaging: MRI of lumbar    Preoperative Diagnosis:  Spinal stenosis of lumbar region with neurogenic claudication [M48.062]    Postoperative Diagnosis: Same as above. Procedure performed: LUMBAR INTERLAMINAR EPIDURAL INJECTION with sedation     Anesthesia: Local and IV Sedation. EBL: Less than 1 ml. Procedure Description:  After reviewing the patient's history and performing a focused physical examination, the diagnosis and positive previous diagnostic tests were confirmed and contraindications such as infection and coagulopathy were ruled out. Following review of allergies and potential side effects and complications, including but not necessarily limited to infection, allergic reaction, local tissue breakdown, nerve injury, post-dural puncture headache and paresis, the patient consented for the procedure. The patient was brought to the procedure room in prone position. After comprehensive monitors were applied and IV access in the patient's arm, moderate intravenous sedation was given with versed and fentanyl. Moderate sedation was given for 8 minutes. After sterile prep lumbar spine, the L5-S1 interspace was identified with the help of fluoroscopy. Local anesthetic was given by a 25 gauge 1.5 inch needle with 1% lidocaine in that space level. Thereafter, a 20 gauge Tuohy needle was introduced and advanced under fluoroscopy. The epidural space was accessed by using loss of resistance to air technique. The needle position was confirmed with AP and lateral view under fluoroscopy. Omnipaque 180 was injected in 1 mL volume. A good epidurogram was obtained.   Thereafter, dexamethasone 10 mg with normal saline of 5 mL in total volume of 6 mL was injected through the Tuohy needle. The needle was flushed with 1 mL lidocaine. The needle was withdrawn with the stylet intact in situ. The needle's tip was intact. The patient tolerated the procedure very well without significant immediate complication. The patient's back was cleaned and sterile dressing was applied. The patient was discharged with an instruction to a responsible adult after discharge criteria were met. The patient was advised to contact us should any problems happen. The patient was also informed to go to the emergency room immediately if experiencing severe numbness or weakness in the extremities or experiencing bowel or bladder incontinence. Complications: None. Follow up: The patient was followed in the pain clinic as needed basis.           Yu Farrell MD

## 2022-08-11 ENCOUNTER — LAB ENCOUNTER (OUTPATIENT)
Dept: LAB | Age: 82
End: 2022-08-11
Attending: FAMILY MEDICINE
Payer: MEDICARE

## 2022-08-11 DIAGNOSIS — E78.00 PURE HYPERCHOLESTEROLEMIA: ICD-10-CM

## 2022-08-11 DIAGNOSIS — E11.65 TYPE 2 DIABETES MELLITUS WITH HYPERGLYCEMIA, WITHOUT LONG-TERM CURRENT USE OF INSULIN (HCC): ICD-10-CM

## 2022-08-11 LAB
ALBUMIN SERPL-MCNC: 3.7 G/DL (ref 3.4–5)
ALBUMIN/GLOB SERPL: 1.2 {RATIO} (ref 1–2)
ALP LIVER SERPL-CCNC: 79 U/L
ALT SERPL-CCNC: 20 U/L
ANION GAP SERPL CALC-SCNC: 5 MMOL/L (ref 0–18)
AST SERPL-CCNC: 20 U/L (ref 15–37)
BILIRUB SERPL-MCNC: 0.5 MG/DL (ref 0.1–2)
BUN BLD-MCNC: 19 MG/DL (ref 7–18)
CALCIUM BLD-MCNC: 8.9 MG/DL (ref 8.5–10.1)
CHLORIDE SERPL-SCNC: 112 MMOL/L (ref 98–112)
CHOLEST SERPL-MCNC: 152 MG/DL (ref ?–200)
CO2 SERPL-SCNC: 27 MMOL/L (ref 21–32)
CREAT BLD-MCNC: 0.94 MG/DL
CREAT UR-SCNC: 175 MG/DL
EST. AVERAGE GLUCOSE BLD GHB EST-MCNC: 163 MG/DL (ref 68–126)
FASTING PATIENT LIPID ANSWER: YES
FASTING STATUS PATIENT QL REPORTED: YES
GFR SERPLBLD BASED ON 1.73 SQ M-ARVRAT: 61 ML/MIN/1.73M2 (ref 60–?)
GLOBULIN PLAS-MCNC: 3.2 G/DL (ref 2.8–4.4)
GLUCOSE BLD-MCNC: 113 MG/DL (ref 70–99)
HBA1C MFR BLD: 7.3 % (ref ?–5.7)
HDLC SERPL-MCNC: 65 MG/DL (ref 40–59)
LDLC SERPL CALC-MCNC: 71 MG/DL (ref ?–100)
MICROALBUMIN UR-MCNC: 1.77 MG/DL
MICROALBUMIN/CREAT 24H UR-RTO: 10.1 UG/MG (ref ?–30)
NONHDLC SERPL-MCNC: 87 MG/DL (ref ?–130)
OSMOLALITY SERPL CALC.SUM OF ELEC: 301 MOSM/KG (ref 275–295)
POTASSIUM SERPL-SCNC: 3.6 MMOL/L (ref 3.5–5.1)
PROT SERPL-MCNC: 6.9 G/DL (ref 6.4–8.2)
SODIUM SERPL-SCNC: 144 MMOL/L (ref 136–145)
TRIGL SERPL-MCNC: 83 MG/DL (ref 30–149)
VLDLC SERPL CALC-MCNC: 13 MG/DL (ref 0–30)

## 2022-08-11 PROCEDURE — 80061 LIPID PANEL: CPT

## 2022-08-11 PROCEDURE — 80053 COMPREHEN METABOLIC PANEL: CPT

## 2022-08-11 PROCEDURE — 36415 COLL VENOUS BLD VENIPUNCTURE: CPT

## 2022-08-11 PROCEDURE — 82570 ASSAY OF URINE CREATININE: CPT

## 2022-08-11 PROCEDURE — 83036 HEMOGLOBIN GLYCOSYLATED A1C: CPT

## 2022-08-11 PROCEDURE — 82043 UR ALBUMIN QUANTITATIVE: CPT

## 2022-08-17 ENCOUNTER — OFFICE VISIT (OUTPATIENT)
Dept: PAIN CLINIC | Facility: CLINIC | Age: 82
End: 2022-08-17
Payer: MEDICARE

## 2022-08-17 VITALS
DIASTOLIC BLOOD PRESSURE: 72 MMHG | OXYGEN SATURATION: 98 % | WEIGHT: 180 LBS | BODY MASS INDEX: 33 KG/M2 | SYSTOLIC BLOOD PRESSURE: 128 MMHG | HEART RATE: 58 BPM

## 2022-08-17 DIAGNOSIS — M48.062 SPINAL STENOSIS OF LUMBAR REGION WITH NEUROGENIC CLAUDICATION: Primary | ICD-10-CM

## 2022-08-17 PROCEDURE — 99214 OFFICE O/P EST MOD 30 MIN: CPT | Performed by: ANESTHESIOLOGY

## 2022-08-17 NOTE — PROGRESS NOTES
Last procedure: LUMBAR INTERLAMINAR EPIDURAL INJECTION with sedation  Date: 08/09/22  Percentage of relief obtained:  Less than 50%  Duration of relief: current    Current Pain Score: 7 when on her feet

## 2022-08-31 RX ORDER — LEVOTHYROXINE SODIUM 0.05 MG/1
TABLET ORAL
Qty: 90 TABLET | Refills: 0 | Status: SHIPPED | OUTPATIENT
Start: 2022-08-31 | End: 2022-09-02

## 2022-09-01 ENCOUNTER — TELEPHONE (OUTPATIENT)
Dept: FAMILY MEDICINE CLINIC | Facility: CLINIC | Age: 82
End: 2022-09-01

## 2022-09-01 NOTE — TELEPHONE ENCOUNTER
Patient's daughter called for copy of referral for michael. Printed, placed in file at front [address] will .

## 2022-09-02 RX ORDER — LEVOTHYROXINE SODIUM 0.05 MG/1
TABLET ORAL
Qty: 90 TABLET | Refills: 0 | Status: SHIPPED | OUTPATIENT
Start: 2022-09-02

## 2022-09-12 ENCOUNTER — OFFICE VISIT (OUTPATIENT)
Dept: FAMILY MEDICINE CLINIC | Facility: CLINIC | Age: 82
End: 2022-09-12
Payer: MEDICARE

## 2022-09-12 VITALS
WEIGHT: 176 LBS | BODY MASS INDEX: 32 KG/M2 | RESPIRATION RATE: 17 BRPM | OXYGEN SATURATION: 98 % | SYSTOLIC BLOOD PRESSURE: 124 MMHG | DIASTOLIC BLOOD PRESSURE: 84 MMHG | HEART RATE: 53 BPM

## 2022-09-12 DIAGNOSIS — M48.062 SPINAL STENOSIS OF LUMBAR REGION WITH NEUROGENIC CLAUDICATION: Primary | ICD-10-CM

## 2022-09-12 DIAGNOSIS — F41.9 ANXIETY: ICD-10-CM

## 2022-09-12 PROCEDURE — 99214 OFFICE O/P EST MOD 30 MIN: CPT | Performed by: FAMILY MEDICINE

## 2022-09-12 RX ORDER — METHYLPREDNISOLONE 4 MG/1
TABLET ORAL
Qty: 1 EACH | Refills: 0 | Status: SHIPPED | OUTPATIENT
Start: 2022-09-12

## 2022-09-12 RX ORDER — SERTRALINE HYDROCHLORIDE 25 MG/1
25 TABLET, FILM COATED ORAL DAILY
Qty: 30 TABLET | Refills: 0 | Status: SHIPPED | OUTPATIENT
Start: 2022-09-12

## 2022-09-12 NOTE — PATIENT INSTRUCTIONS
Corrine Gonzalez M.D. Spine, Back and Neck    Phone: [phone],   [place], [facility], [address]  [place], [address] [id number] [facility], [address] [place] [address]  Pain specialists      [address] [place] [address] Road  [address]  [place], [address].      Phone: [phone]

## 2022-09-14 ENCOUNTER — PATIENT MESSAGE (OUTPATIENT)
Dept: FAMILY MEDICINE CLINIC | Facility: CLINIC | Age: 82
End: 2022-09-14

## 2022-09-14 DIAGNOSIS — Z85.3 HISTORY OF BREAST CANCER: Primary | ICD-10-CM

## 2022-09-14 NOTE — TELEPHONE ENCOUNTER
From: German Gregory  To: Pau Spence MD  Sent: 9/14/2022 2:29 PM CDT  Subject: Catherine Calderon, can you please write me a script for \"Breast Prosthesis, Prostitetic Bra's\". I would appreciate if you would mail to me. My appointment is Sept. 26 at [facility] and they and Medicare require this script! Thank You!

## 2022-10-08 DIAGNOSIS — F41.9 ANXIETY: ICD-10-CM

## 2022-10-10 RX ORDER — SERTRALINE HYDROCHLORIDE 25 MG/1
25 TABLET, FILM COATED ORAL DAILY
Qty: 30 TABLET | Refills: 0 | Status: SHIPPED | OUTPATIENT
Start: 2022-10-10

## 2022-10-24 ENCOUNTER — LAB ENCOUNTER (OUTPATIENT)
Dept: LAB | Age: 82
End: 2022-10-24
Attending: INTERNAL MEDICINE
Payer: MEDICARE

## 2022-10-24 DIAGNOSIS — E78.00 PURE HYPERCHOLESTEROLEMIA: Primary | ICD-10-CM

## 2022-10-24 LAB
ALBUMIN SERPL-MCNC: 3.7 G/DL (ref 3.4–5)
ALBUMIN/GLOB SERPL: 1.1 {RATIO} (ref 1–2)
ALP LIVER SERPL-CCNC: 86 U/L
ALT SERPL-CCNC: 20 U/L
ANION GAP SERPL CALC-SCNC: 4 MMOL/L (ref 0–18)
AST SERPL-CCNC: 15 U/L (ref 15–37)
BILIRUB SERPL-MCNC: 1 MG/DL (ref 0.1–2)
BUN BLD-MCNC: 13 MG/DL (ref 7–18)
CALCIUM BLD-MCNC: 9.5 MG/DL (ref 8.5–10.1)
CHLORIDE SERPL-SCNC: 109 MMOL/L (ref 98–112)
CHOLEST SERPL-MCNC: 157 MG/DL (ref ?–200)
CO2 SERPL-SCNC: 27 MMOL/L (ref 21–32)
CREAT BLD-MCNC: 0.84 MG/DL
FASTING PATIENT LIPID ANSWER: YES
FASTING STATUS PATIENT QL REPORTED: YES
GFR SERPLBLD BASED ON 1.73 SQ M-ARVRAT: 69 ML/MIN/1.73M2 (ref 60–?)
GLOBULIN PLAS-MCNC: 3.4 G/DL (ref 2.8–4.4)
GLUCOSE BLD-MCNC: 146 MG/DL (ref 70–99)
HDLC SERPL-MCNC: 50 MG/DL (ref 40–59)
LDLC SERPL CALC-MCNC: 87 MG/DL (ref ?–100)
NONHDLC SERPL-MCNC: 107 MG/DL (ref ?–130)
OSMOLALITY SERPL CALC.SUM OF ELEC: 293 MOSM/KG (ref 275–295)
POTASSIUM SERPL-SCNC: 4.1 MMOL/L (ref 3.5–5.1)
PROT SERPL-MCNC: 7.1 G/DL (ref 6.4–8.2)
SODIUM SERPL-SCNC: 140 MMOL/L (ref 136–145)
TRIGL SERPL-MCNC: 108 MG/DL (ref 30–149)
VLDLC SERPL CALC-MCNC: 17 MG/DL (ref 0–30)

## 2022-10-24 PROCEDURE — 80061 LIPID PANEL: CPT

## 2022-10-24 PROCEDURE — 36415 COLL VENOUS BLD VENIPUNCTURE: CPT

## 2022-10-24 PROCEDURE — 80053 COMPREHEN METABOLIC PANEL: CPT

## 2022-11-02 ENCOUNTER — IMMUNIZATION (OUTPATIENT)
Dept: FAMILY MEDICINE CLINIC | Facility: CLINIC | Age: 82
End: 2022-11-02
Payer: MEDICARE

## 2022-11-02 DIAGNOSIS — Z23 NEED FOR VACCINATION: Primary | ICD-10-CM

## 2022-11-02 PROCEDURE — G0008 ADMIN INFLUENZA VIRUS VAC: HCPCS | Performed by: FAMILY MEDICINE

## 2022-11-02 PROCEDURE — 90662 IIV NO PRSV INCREASED AG IM: CPT | Performed by: FAMILY MEDICINE

## 2022-11-07 DIAGNOSIS — F41.9 ANXIETY: ICD-10-CM

## 2022-11-07 RX ORDER — SERTRALINE HYDROCHLORIDE 25 MG/1
25 TABLET, FILM COATED ORAL DAILY
Qty: 30 TABLET | Refills: 3 | Status: SHIPPED | OUTPATIENT
Start: 2022-11-07

## 2022-11-23 RX ORDER — LEVOTHYROXINE SODIUM 0.05 MG/1
TABLET ORAL
Qty: 90 TABLET | Refills: 0 | Status: SHIPPED | OUTPATIENT
Start: 2022-11-23

## 2022-12-12 ENCOUNTER — OFFICE VISIT (OUTPATIENT)
Dept: FAMILY MEDICINE CLINIC | Facility: CLINIC | Age: 82
End: 2022-12-12
Payer: MEDICARE

## 2022-12-12 VITALS
TEMPERATURE: 98 F | WEIGHT: 178 LBS | HEART RATE: 62 BPM | BODY MASS INDEX: 32.76 KG/M2 | OXYGEN SATURATION: 99 % | SYSTOLIC BLOOD PRESSURE: 130 MMHG | DIASTOLIC BLOOD PRESSURE: 70 MMHG | RESPIRATION RATE: 20 BRPM | HEIGHT: 62 IN

## 2022-12-12 DIAGNOSIS — R20.0 NUMBNESS OF HAND: Primary | ICD-10-CM

## 2022-12-12 PROCEDURE — 99214 OFFICE O/P EST MOD 30 MIN: CPT | Performed by: FAMILY MEDICINE

## 2022-12-16 ENCOUNTER — WALK IN (OUTPATIENT)
Dept: URGENT CARE | Age: 82
End: 2022-12-16
Attending: EMERGENCY MEDICINE

## 2022-12-16 ENCOUNTER — HOSPITAL ENCOUNTER (OUTPATIENT)
Dept: GENERAL RADIOLOGY | Age: 82
Discharge: HOME | End: 2022-12-16
Attending: EMERGENCY MEDICINE

## 2022-12-16 VITALS
RESPIRATION RATE: 16 BRPM | SYSTOLIC BLOOD PRESSURE: 167 MMHG | OXYGEN SATURATION: 98 % | BODY MASS INDEX: 32.19 KG/M2 | WEIGHT: 176 LBS | HEART RATE: 66 BPM | TEMPERATURE: 98.2 F | DIASTOLIC BLOOD PRESSURE: 90 MMHG

## 2022-12-16 DIAGNOSIS — S46.911A STRAIN OF RIGHT SHOULDER, INITIAL ENCOUNTER: ICD-10-CM

## 2022-12-16 DIAGNOSIS — S46.001A ROTATOR CUFF INJURY, RIGHT, INITIAL ENCOUNTER: Primary | ICD-10-CM

## 2022-12-16 PROCEDURE — 73030 X-RAY EXAM OF SHOULDER: CPT

## 2022-12-16 PROCEDURE — 99213 OFFICE O/P EST LOW 20 MIN: CPT

## 2022-12-16 ASSESSMENT — PAIN SCALES - GENERAL: PAINLEVEL_OUTOF10: 2

## 2022-12-26 ENCOUNTER — PATIENT MESSAGE (OUTPATIENT)
Dept: FAMILY MEDICINE CLINIC | Facility: CLINIC | Age: 82
End: 2022-12-26

## 2022-12-27 NOTE — TELEPHONE ENCOUNTER
From: Pao [facility]  To: Pedro Luis Jacome MD  Sent: 12/26/2022 5:42 PM CST  Subject: Samantha Thapa, this is Georgia's daughter Danny Leonardo. My Mom got sick the day after our visit to Immediate Care for her Shoulder injury. Basics, runny nose with mucus coming out freely and clear. Chest congestion no pain, comes up easily when cough, was clear, then a little tinge of color. Very Very deep cough, mucus comes up. Absolutely horrible sounding. She is currently taking Robutussin, teaspoons of horseradish and honey, drinking tea and coke. She does not feel sick. The sounding of the congested cough sounds terrible. As for me, I just started yesterday. Runny nose, blowing constantly lots coming out, all clear. Slight headache and congestion cough. Not nearly as bad sounding as my Mom. Taking the same things my Mom is, minus the horseradish. Do you think we need to be prescribed something? asking for my Mom and Myself.

## 2022-12-27 NOTE — TELEPHONE ENCOUNTER
Pt's dtr asking if she and Mom need Rx as they both came down with chest congestion, horrible sounding cough, with phlegm, runny nose w/ clear mucus and slight headache. Taking Robitussin, horseradish, honey, and tea. LOV for Pt 12/12/22. Please advise.

## 2022-12-28 RX ORDER — AZITHROMYCIN 250 MG/1
TABLET, FILM COATED ORAL
Qty: 6 TABLET | Refills: 0 | Status: SHIPPED | OUTPATIENT
Start: 2022-12-28 | End: 2023-01-02

## 2023-01-26 ENCOUNTER — TELEPHONE (OUTPATIENT)
Dept: ORTHOPEDICS CLINIC | Facility: CLINIC | Age: 83
End: 2023-01-26

## 2023-01-26 ENCOUNTER — OFFICE VISIT (OUTPATIENT)
Dept: PAIN CLINIC | Facility: CLINIC | Age: 83
End: 2023-01-26
Payer: MEDICARE

## 2023-01-26 ENCOUNTER — TELEPHONE (OUTPATIENT)
Dept: PAIN CLINIC | Facility: CLINIC | Age: 83
End: 2023-01-26

## 2023-01-26 VITALS — HEART RATE: 66 BPM | SYSTOLIC BLOOD PRESSURE: 130 MMHG | OXYGEN SATURATION: 99 % | DIASTOLIC BLOOD PRESSURE: 78 MMHG

## 2023-01-26 DIAGNOSIS — M48.062 SPINAL STENOSIS OF LUMBAR REGION WITH NEUROGENIC CLAUDICATION: Primary | ICD-10-CM

## 2023-01-26 PROCEDURE — 99214 OFFICE O/P EST MOD 30 MIN: CPT | Performed by: PHYSICIAN ASSISTANT

## 2023-01-26 NOTE — PROGRESS NOTES
Patient presents in office today with reported pain in back and legs    Current pain level reported = 0/10 sitting, 5-6/10 standing    Last reported dose of n/a      Narcotic Contract renewal n/a    Urine Drug screen n/a

## 2023-01-26 NOTE — TELEPHONE ENCOUNTER
Question Answer Comment   Anesthesia Type Sedation    Provider [facility]    Location Lab    Procedure Lumbar ESPERANZA previous at L5/S1   CPT (Hit enter after each entry) NJX INTERLAMINAR LMBR/SAC    Medications to hold asa    Medical clearance requested (will send to Pain Navigator) No    Patient has Medicare coverage?  Yes

## 2023-01-31 ENCOUNTER — OFFICE VISIT (OUTPATIENT)
Dept: ELECTROPHYSIOLOGY | Facility: HOSPITAL | Age: 83
End: 2023-01-31
Attending: FAMILY MEDICINE
Payer: MEDICARE

## 2023-01-31 DIAGNOSIS — R20.0 NUMBNESS OF HAND: ICD-10-CM

## 2023-01-31 PROCEDURE — 95886 MUSC TEST DONE W/N TEST COMP: CPT | Performed by: OTHER

## 2023-01-31 PROCEDURE — 95911 NRV CNDJ TEST 9-10 STUDIES: CPT | Performed by: OTHER

## 2023-01-31 NOTE — PROCEDURES
[facility]  Nerve Conduction & EMG Report                      Joanna Aguila, [address]   EMG department   [address] [facility] [address], [address]  [phone]          Patient name: Rigoberto Huitron  YOB: 1940  Referring provider: Dr. Radha Zayas  Reason for study: Eval for mononeuropathy  Brief history: 80year old female with several month history of intermittent paresthesias in the hands that radiate from the forearm to all the fingers. Sensory and motor nerve conduction studies, and needle EMG:  Please see separate sheet for waveforms and quantitative nerve conduction data, as well as for tabulated form of electromyographic data. Summary:   1. The bilateral median sensory responses had decreased amplitudes. Also distal latencies were both prolonged. 2.  The bilateral ulnar and left radial sensory responses were normal.  The left one was minimally prolonged, however this was felt to be due to cold temperature effect. 3.  The bilateral median motor responses had prolonged distal latencies. The amplitudes were normal.  The conduction velocity of the left hand was borderline slow. 4.  The bilateral ulnar motor responses were normal.  5.  Needle EMG using a concentric needle was performed on selected muscles of the right upper extremity. All muscles tested were normal.    Conclusion:  1. 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 2.  There is no evidence of a cervical radiculopathy on this study.       Joanna Aguila DO  Neurology and Neuromuscular medicine  [facility]

## 2023-02-07 ENCOUNTER — HOSPITAL ENCOUNTER (OUTPATIENT)
Facility: HOSPITAL | Age: 83
Setting detail: HOSPITAL OUTPATIENT SURGERY
Discharge: HOME | End: 2023-02-07
Attending: ANESTHESIOLOGY | Admitting: ANESTHESIOLOGY
Payer: MEDICARE

## 2023-02-07 ENCOUNTER — APPOINTMENT (OUTPATIENT)
Dept: GENERAL RADIOLOGY | Facility: HOSPITAL | Age: 83
End: 2023-02-07
Attending: ANESTHESIOLOGY
Payer: MEDICARE

## 2023-02-07 VITALS
OXYGEN SATURATION: 98 % | TEMPERATURE: 98 F | HEIGHT: 62 IN | RESPIRATION RATE: 18 BRPM | DIASTOLIC BLOOD PRESSURE: 76 MMHG | SYSTOLIC BLOOD PRESSURE: 150 MMHG | BODY MASS INDEX: 32.76 KG/M2 | HEART RATE: 56 BPM | WEIGHT: 178 LBS

## 2023-02-07 PROCEDURE — 62323 NJX INTERLAMINAR LMBR/SAC: CPT | Performed by: ANESTHESIOLOGY

## 2023-02-07 PROCEDURE — 3E0U33Z INTRODUCTION OF ANTI-INFLAMMATORY INTO JOINTS, PERCUTANEOUS APPROACH: ICD-10-PCS | Performed by: ANESTHESIOLOGY

## 2023-02-07 RX ORDER — LIDOCAINE HYDROCHLORIDE 10 MG/ML
INJECTION, SOLUTION EPIDURAL; INFILTRATION; INTRACAUDAL; PERINEURAL
Status: DISCONTINUED | OUTPATIENT
Start: 2023-02-07 | End: 2023-02-07

## 2023-02-07 RX ORDER — DIPHENHYDRAMINE HYDROCHLORIDE 50 MG/ML
50 INJECTION INTRAMUSCULAR; INTRAVENOUS ONCE AS NEEDED
Status: DISCONTINUED | OUTPATIENT
Start: 2023-02-07 | End: 2023-02-07

## 2023-02-07 RX ORDER — SODIUM CHLORIDE, SODIUM LACTATE, POTASSIUM CHLORIDE, CALCIUM CHLORIDE 600; 310; 30; 20 MG/100ML; MG/100ML; MG/100ML; MG/100ML
100 INJECTION, SOLUTION INTRAVENOUS CONTINUOUS
Status: DISCONTINUED | OUTPATIENT
Start: 2023-02-07 | End: 2023-02-07

## 2023-02-07 RX ORDER — DEXAMETHASONE SODIUM PHOSPHATE 10 MG/ML
INJECTION, SOLUTION INTRAMUSCULAR; INTRAVENOUS
Status: DISCONTINUED | OUTPATIENT
Start: 2023-02-07 | End: 2023-02-07

## 2023-02-07 RX ORDER — MIDAZOLAM HYDROCHLORIDE 1 MG/ML
INJECTION INTRAMUSCULAR; INTRAVENOUS
Status: DISCONTINUED | OUTPATIENT
Start: 2023-02-07 | End: 2023-02-07

## 2023-02-07 RX ORDER — SODIUM CHLORIDE 9 MG/ML
INJECTION INTRAVENOUS
Status: DISCONTINUED | OUTPATIENT
Start: 2023-02-07 | End: 2023-02-07

## 2023-02-07 RX ORDER — ONDANSETRON 2 MG/ML
4 INJECTION INTRAMUSCULAR; INTRAVENOUS ONCE AS NEEDED
Status: DISCONTINUED | OUTPATIENT
Start: 2023-02-07 | End: 2023-02-07

## 2023-02-07 NOTE — OPERATIVE REPORT
[facility]  Operative Report  2023     Minor Conteh Patient Status:  Hospital Outpatient Surgery    1940 MRN [id number]   Location [address] [address] Attending Jl Ross MD   Hosp Day # 0 PCP Jaimie Gunderson MD     Indication: Brandan Navarro is a 80year old female with lumbar spinal stenosis    Imaging: MRI    Preoperative Diagnosis:  Spinal stenosis of lumbar region with neurogenic claudication [M48.062]    Postoperative Diagnosis: Same as above. Procedure performed: LUMBAR INTERLAMINAR EPIDURAL INJECTION with sedation     Anesthesia: Local and IV Sedation. EBL: Less than 1 ml. Procedure Description:  After reviewing the patient's history and performing a focused physical examination, the diagnosis and positive previous diagnostic tests were confirmed and contraindications such as infection and coagulopathy were ruled out. Following review of allergies and potential side effects and complications, including but not necessarily limited to infection, allergic reaction, local tissue breakdown, nerve injury, post-dural puncture headache and paresis, the patient consented for the procedure. The patient was brought to the procedure room in prone position. After comprehensive monitors were applied and IV access in the patient's arm, moderate intravenous sedation was given with versed and fentanyl. Moderate sedation was given for 18 minutes. After sterile prep of the lumbar spine, the L4-L5 interspace was identified with the help of fluoroscopy. Local anesthetic was given by a 25 gauge 1.5 inch needle with 1% lidocaine in that space level. Thereafter, a 20 gauge Tuohy needle was introduced and advanced under fluoroscopy. The epidural space was accessed by using loss of resistance to air technique. The needle position was confirmed with AP and lateral view under fluoroscopy. Omnipaque 180 was injected in 1 mL volume. A good epidurogram was obtained.   Thereafter, dexamethasone 10 mg with normal saline of 5 mL in total volume of 6 mL was injected through the Tuohy needle. The needle was flushed with 1 mL lidocaine. The needle was withdrawn with the stylet intact in situ. The needle's tip was intact. The patient tolerated the procedure very well without significant immediate complication. The patient's back was cleaned and sterile dressing was applied. The patient was discharged with an instruction to a responsible adult after discharge criteria were met. The patient was advised to contact us should any problems happen. The patient was also informed to go to the emergency room immediately if experiencing severe numbness or weakness in the extremities or experiencing bowel or bladder incontinence. Complications: None. Follow up: The patient was followed in the pain clinic as needed basis.           Karolyn Hernández MD

## 2023-02-07 NOTE — DISCHARGE INSTRUCTIONS
You have been given medication that makes you sleepy. This may have included both pain medication and sleeping medication. Most of the effects have worn off but you may still have some drowsiness for the next 6 to 8 hours. Home Care  Follow these guidelines when you get home:    --Don't drink any alcohol for the next 24 hours. --Don't drive, operate dangerous machinery, make important business or personal    decisions, or sign legal documents during the next 24 hours. Follow Up Care  Follow up with your healthcare provider if you are not alert and back to your usual level of activity within 12 hours    When to seek medical advice  Call your healthcare provider right away if any of these occur:    --Drowsiness that gets worse  --Weakness or dizziness that gets worse  --Repeated vomiting  --You can not be awakened    Home Care Instructions Following Your Pain Procedure     [place],  It has been a pleasure to have you as our patient. To help you at home, you must follow these general discharge instructions. We will review these with you before you are discharged. It is our hope that you have a complete and uneventful recovery from our procedure. General Instructions:  What to Expect:  Bandages from your procedure today can be removed when you get home. Please avoid soaking and/or swimming for 24 hours. Showering is okay  It is normal to have increased pain symptoms and/or pain at injection site for up to 3-5 days after procedure, you can use heat or ice (20 minutes on 20 minutes off) for comfort. You may experience some temporary side effects which may include restlessness or insomnia, flushing of the face, or heart palpitations. Please contact the provider if these symptoms do not resolve within 3-4 days. Lightheadedness or nausea may occur and should resolve within 24 to 48 hours.   If you develop a headache after treatment, rest, drink fluids (with caffeine, if possible) and take mild over-the-counter pain medication. If the headache does not improve with the above treatment, contact the physician. Home Medications:  Resume all previously prescribed medication. Please avoid taking NSAIDs (Non-Steriodal Anti-Inflammatory Drugs) such as:  Ibuprofen ( Advil, Motrin) Aleve (Naproxen), Diclofenac, Meloxicam for 6 hours after procedure. If you are on Coumadin (Warfarin) or any other anti-coagulant (or \"blood thinning\") medication such as Plavix (Clopidogrel), Xarelto (Rivaroxaban), Eliquis (Apixaban), Effient (Prasugrel) etc., restart on the following day from the procedure unless otherwise directed by your provider. If you are a diabetic, please increase the frequency of your glucose monitoring after the procedure as steroids may cause a temporary (2-3 day) increase in your blood sugar. Contact your primary care physician if your blood sugar remains elevated as you may require some medication adjustment. Diet:  Resume your regular diet as tolerated. Activity: We recommend that you relax and rest during the rest of your procedure day. If you feel weakness in your arms or legs do not drive. Follow-up Appointment  Please schedule a follow-up visit within 3 to 4 weeks after your last procedure date. Question or Concerns:  Feel free to call our office with any questions or concerns at [phone] (option #2)    [place]  Thank you for coming to [facility] for your procedure. The nurses try very hard to make sure you receive the best care possible. Your trust in them as well as us is greatly appreciated.     Thanks so much,   Dr. Garrett Salazar

## 2023-02-08 ENCOUNTER — TELEPHONE (OUTPATIENT)
Dept: PAIN CLINIC | Facility: CLINIC | Age: 83
End: 2023-02-08

## 2023-02-08 NOTE — TELEPHONE ENCOUNTER
Pancho called placed to patient for post procedure follow up. Patient stated \"im doing good and back pain is there back lesser and today I was able to walk . Informed patient that soreness is to be expected after the procedure. Educated patient that it takes 3-5 days for the steroid to be effective and to allow adequate time for medication to work. Encouraged patient to alternate ice and heat and to take medications as prescribed. Pt verbalized understanding to call with any questions or concerns.       Procedure: Lumbar Interlaminar Epidural Injection  Date: 2/7/2023  Follow up Visit Scheduled: 2/21/2023 with Latoya Cervantes

## 2023-02-19 DIAGNOSIS — R42 DIZZINESS: ICD-10-CM

## 2023-02-20 RX ORDER — LEVOTHYROXINE SODIUM 0.05 MG/1
TABLET ORAL
Qty: 90 TABLET | Refills: 0 | Status: SHIPPED | OUTPATIENT
Start: 2023-02-20

## 2023-02-21 ENCOUNTER — TELEPHONE (OUTPATIENT)
Dept: PAIN CLINIC | Facility: CLINIC | Age: 83
End: 2023-02-21

## 2023-02-21 ENCOUNTER — OFFICE VISIT (OUTPATIENT)
Dept: PAIN CLINIC | Facility: CLINIC | Age: 83
End: 2023-02-21
Payer: MEDICARE

## 2023-02-21 VITALS
BODY MASS INDEX: 33 KG/M2 | WEIGHT: 178 LBS | OXYGEN SATURATION: 98 % | DIASTOLIC BLOOD PRESSURE: 78 MMHG | SYSTOLIC BLOOD PRESSURE: 114 MMHG | HEART RATE: 82 BPM

## 2023-02-21 DIAGNOSIS — M54.16 LUMBAR RADICULITIS: Primary | ICD-10-CM

## 2023-02-21 DIAGNOSIS — M48.062 SPINAL STENOSIS OF LUMBAR REGION WITH NEUROGENIC CLAUDICATION: Primary | ICD-10-CM

## 2023-02-21 PROCEDURE — 99214 OFFICE O/P EST MOD 30 MIN: CPT | Performed by: PHYSICIAN ASSISTANT

## 2023-02-21 RX ORDER — AZITHROMYCIN 250 MG/1
TABLET, FILM COATED ORAL
COMMUNITY
End: 2023-02-21 | Stop reason: ALTCHOICE

## 2023-02-21 RX ORDER — BUSPIRONE HYDROCHLORIDE 5 MG/1
5 TABLET ORAL 2 TIMES DAILY
Qty: 180 TABLET | Refills: 0 | Status: SHIPPED | OUTPATIENT
Start: 2023-02-21

## 2023-02-21 RX ORDER — METHYLPREDNISOLONE 4 MG/1
TABLET ORAL
COMMUNITY
End: 2023-02-21 | Stop reason: ALTCHOICE

## 2023-02-21 NOTE — TELEPHONE ENCOUNTER
Question Answer   Anesthesia Type Sedation   Provider [facility] Procedure Lab   Procedure Lumbar ESPERANZA   CPT (Hit enter after each entry) NJX INTERLAMINAR LMBR/SAC   Medications to hold asa   Medical clearance requested (will send to Pain Navigator) No   Patient has Medicare coverage?  Yes

## 2023-02-21 NOTE — PROGRESS NOTES
Last procedure: LUMBAR INTERLAMINAR EPIDURAL INJECTION with sedation  Date: 02/07/23  Percentage of relief obtained: sciatic 100% better, no relief in \"back pain\"   Duration of relief: current    Current Pain Score: 6-7 with activity

## 2023-02-28 ENCOUNTER — OFFICE VISIT (OUTPATIENT)
Dept: ORTHOPEDICS CLINIC | Facility: CLINIC | Age: 83
End: 2023-02-28
Payer: MEDICARE

## 2023-02-28 VITALS — BODY MASS INDEX: 32.76 KG/M2 | WEIGHT: 178 LBS | HEIGHT: 62 IN

## 2023-02-28 DIAGNOSIS — S46.011A TRAUMATIC INCOMPLETE TEAR OF RIGHT ROTATOR CUFF, INITIAL ENCOUNTER: ICD-10-CM

## 2023-02-28 DIAGNOSIS — M75.41 IMPINGEMENT SYNDROME OF RIGHT SHOULDER: Primary | ICD-10-CM

## 2023-02-28 PROCEDURE — 20610 DRAIN/INJ JOINT/BURSA W/O US: CPT | Performed by: ORTHOPAEDIC SURGERY

## 2023-02-28 PROCEDURE — 99203 OFFICE O/P NEW LOW 30 MIN: CPT | Performed by: ORTHOPAEDIC SURGERY

## 2023-02-28 PROCEDURE — 1125F AMNT PAIN NOTED PAIN PRSNT: CPT | Performed by: ORTHOPAEDIC SURGERY

## 2023-02-28 RX ORDER — TRIAMCINOLONE ACETONIDE 40 MG/ML
40 INJECTION, SUSPENSION INTRA-ARTICULAR; INTRAMUSCULAR ONCE
Status: COMPLETED | OUTPATIENT
Start: 2023-02-28 | End: 2023-02-28

## 2023-02-28 RX ADMIN — TRIAMCINOLONE ACETONIDE 40 MG: 40 INJECTION, SUSPENSION INTRA-ARTICULAR; INTRAMUSCULAR at 14:04:00

## 2023-02-28 NOTE — PROGRESS NOTES
Patient is a 80-year-old female here for evaluation of her right shoulder. Patient had a fall or injury to the right shoulder when she was going down some stairs a couple of months ago. At that time she missed the last step and she held onto the railing and had a severe pain of the shoulder. She did have an evaluation at that time and there was no fractures or obvious injury. The patient states that the shoulder has calmed down but she still has pain on a fairly regular basis. Patient is here with her daughter. Patient does note some pain at night when she sleeping. She also notes some paresthesias of her hands that are intermittent. Patient states she was told she had carpal tunnel syndrome. The patient denies persistent or constant numbness of the hands. She also notes that the paresthesias tend to resolve rather spontaneously do bother her more at night than during the day. Patient's exam of her right shoulder shows her to have about 165 degrees of elevation passively. She does have active abduction of the shoulder. She has 4+/5 strength of abduction. She does have some mild pain with impingement maneuver. Neurologically intact upper extremities to light touch. Handgrip strength grossly 5/5. No significant atrophy of the intrinsic muscles of the hands bilaterally. Radial pulses 2 or 3. X-rays of her right shoulder shows fairly well-maintained glenohumeral joint and subacromial space. Minimal degenerative change of the acromioclavicular joint. No acute changes noted. Impression is that of a rotator cuff strain with possible partial tear. Second impression is that of impingement and bursitis of the right shoulder. Third impression is that of mild carpal tunnel syndrome of the wrist bilaterally. Recommendations are to go ahead with a cortisone injection of the right shoulder which was done under sterile technique through a lateral approach into the subacromial space.   This was done with 2 cc of Xylocaine and Marcaine and 40 mg of Kenalog. Patient tolerated the injection well. No complications. Patient advised to follow-up with us in a couple months if still having problems. If she continues to have difficulties I would go ahead with an MRI scan.

## 2023-03-02 ENCOUNTER — APPOINTMENT (OUTPATIENT)
Dept: GENERAL RADIOLOGY | Facility: HOSPITAL | Age: 83
End: 2023-03-02
Attending: ANESTHESIOLOGY
Payer: MEDICARE

## 2023-03-02 ENCOUNTER — HOSPITAL ENCOUNTER (OUTPATIENT)
Facility: HOSPITAL | Age: 83
Setting detail: HOSPITAL OUTPATIENT SURGERY
Discharge: HOME | End: 2023-03-02
Attending: ANESTHESIOLOGY | Admitting: ANESTHESIOLOGY
Payer: MEDICARE

## 2023-03-02 VITALS
DIASTOLIC BLOOD PRESSURE: 67 MMHG | BODY MASS INDEX: 32.76 KG/M2 | HEART RATE: 57 BPM | TEMPERATURE: 99 F | WEIGHT: 178 LBS | SYSTOLIC BLOOD PRESSURE: 152 MMHG | OXYGEN SATURATION: 97 % | HEIGHT: 62 IN | RESPIRATION RATE: 16 BRPM

## 2023-03-02 PROCEDURE — 62323 NJX INTERLAMINAR LMBR/SAC: CPT | Performed by: ANESTHESIOLOGY

## 2023-03-02 PROCEDURE — 3E0U33Z INTRODUCTION OF ANTI-INFLAMMATORY INTO JOINTS, PERCUTANEOUS APPROACH: ICD-10-PCS | Performed by: ANESTHESIOLOGY

## 2023-03-02 PROCEDURE — 99152 MOD SED SAME PHYS/QHP 5/>YRS: CPT | Performed by: ANESTHESIOLOGY

## 2023-03-02 RX ORDER — INSULIN ASPART 100 [IU]/ML
3 INJECTION, SOLUTION INTRAVENOUS; SUBCUTANEOUS ONCE
Status: DISCONTINUED | OUTPATIENT
Start: 2023-03-02 | End: 2023-03-02

## 2023-03-02 RX ORDER — SODIUM CHLORIDE 9 MG/ML
INJECTION INTRAVENOUS
Status: DISCONTINUED | OUTPATIENT
Start: 2023-03-02 | End: 2023-03-02

## 2023-03-02 RX ORDER — ONDANSETRON 2 MG/ML
4 INJECTION INTRAMUSCULAR; INTRAVENOUS ONCE AS NEEDED
Status: DISCONTINUED | OUTPATIENT
Start: 2023-03-02 | End: 2023-03-02

## 2023-03-02 RX ORDER — NICOTINE POLACRILEX 4 MG
15 LOZENGE BUCCAL
Status: DISCONTINUED | OUTPATIENT
Start: 2023-03-02 | End: 2023-03-02

## 2023-03-02 RX ORDER — MIDAZOLAM HYDROCHLORIDE 1 MG/ML
INJECTION INTRAMUSCULAR; INTRAVENOUS
Status: DISCONTINUED | OUTPATIENT
Start: 2023-03-02 | End: 2023-03-02

## 2023-03-02 RX ORDER — SODIUM CHLORIDE, SODIUM LACTATE, POTASSIUM CHLORIDE, CALCIUM CHLORIDE 600; 310; 30; 20 MG/100ML; MG/100ML; MG/100ML; MG/100ML
100 INJECTION, SOLUTION INTRAVENOUS CONTINUOUS
Status: DISCONTINUED | OUTPATIENT
Start: 2023-03-02 | End: 2023-03-02

## 2023-03-02 RX ORDER — LIDOCAINE HYDROCHLORIDE 10 MG/ML
INJECTION, SOLUTION EPIDURAL; INFILTRATION; INTRACAUDAL; PERINEURAL
Status: DISCONTINUED | OUTPATIENT
Start: 2023-03-02 | End: 2023-03-02

## 2023-03-02 RX ORDER — DEXTROSE MONOHYDRATE 25 G/50ML
50 INJECTION, SOLUTION INTRAVENOUS
Status: DISCONTINUED | OUTPATIENT
Start: 2023-03-02 | End: 2023-03-02

## 2023-03-02 RX ORDER — DIPHENHYDRAMINE HYDROCHLORIDE 50 MG/ML
50 INJECTION INTRAMUSCULAR; INTRAVENOUS ONCE AS NEEDED
Status: DISCONTINUED | OUTPATIENT
Start: 2023-03-02 | End: 2023-03-02

## 2023-03-02 RX ORDER — DEXAMETHASONE SODIUM PHOSPHATE 10 MG/ML
INJECTION, SOLUTION INTRAMUSCULAR; INTRAVENOUS
Status: DISCONTINUED | OUTPATIENT
Start: 2023-03-02 | End: 2023-03-02

## 2023-03-02 RX ORDER — NICOTINE POLACRILEX 4 MG
30 LOZENGE BUCCAL
Status: DISCONTINUED | OUTPATIENT
Start: 2023-03-02 | End: 2023-03-02

## 2023-03-02 NOTE — OPERATIVE REPORT
[facility]  Operative Report  3/2/2023     Mahendra Grimes Patient Status:  Hospital Outpatient Surgery    1940 MRN [id number]   Location [address] [address] Attending Ramsey Dowling MD   Hosp Day # 0 PCP Ahmet Dumont MD     Indication: Niru Crespo is a 80year old female with advanced lumbar DDD    Preoperative Diagnosis:  Lumbar radiculitis [M54.16]    Postoperative Diagnosis: Same as above. Procedure performed: LUMBAR INTERLAMINAR EPIDURAL INJECTION with sedation     Anesthesia: Local and IV Sedation. EBL: Less than 1 ml. Procedure Description:  After reviewing the patient's history and performing a focused physical examination, the diagnosis and positive previous diagnostic tests were confirmed and contraindications such as infection and coagulopathy were ruled out. Following review of allergies and potential side effects and complications, including but not necessarily limited to infection, allergic reaction, local tissue breakdown, nerve injury, post-dural puncture headache and paresis, the patient consented for the procedure. The patient was brought to the procedure room in prone position. After comprehensive monitors were applied and IV access in the patient's arm, moderate intravenous sedation was given with versed and fentanyl. Moderate sedation was given for 12 minutes. After sterile prep and lumbar spine, the L4-L5 interspace was identified with the help of fluoroscopy. Local anesthetic was given by a 25 gauge 1.5 inch needle with 1% lidocaine in that space level. Thereafter, a 20 gauge Tuohy needle was introduced and advanced under fluoroscopy. The epidural space was accessed by using loss of resistance to air technique. The needle position was confirmed with AP and lateral view under fluoroscopy. Omnipaque 180 was injected in 1 mL volume. A good epidurogram was obtained.   Thereafter, dexamethasone 10 mg with normal saline of 5 mL in total volume of 6 mL was injected through the Tuohy needle. The needle was flushed with 1 mL lidocaine. The needle was withdrawn with the stylet intact in situ. The needle's tip was intact. The patient tolerated the procedure very well without significant immediate complication. The patient's back was cleaned and sterile dressing was applied. The patient was discharged with an instruction to a responsible adult after discharge criteria were met. The patient was advised to contact us should any problems happen. The patient was also informed to go to the emergency room immediately if experiencing severe numbness or weakness in the extremities or experiencing bowel or bladder incontinence. Complications: None. Follow up: The patient was followed in the pain clinic as needed basis.           Nereyda Newman MD

## 2023-03-03 ENCOUNTER — TELEPHONE (OUTPATIENT)
Dept: PAIN CLINIC | Facility: CLINIC | Age: 83
End: 2023-03-03

## 2023-03-03 NOTE — TELEPHONE ENCOUNTER
Follow-up call post pain procedure. Left message informing patient to contact the pain clinic at [phone] option #3 regarding any questions or concerns about recent pain procedure.

## 2023-03-13 ENCOUNTER — OFFICE VISIT (OUTPATIENT)
Dept: FAMILY MEDICINE CLINIC | Facility: CLINIC | Age: 83
End: 2023-03-13
Payer: MEDICARE

## 2023-03-13 ENCOUNTER — LAB ENCOUNTER (OUTPATIENT)
Dept: LAB | Age: 83
End: 2023-03-13
Attending: FAMILY MEDICINE
Payer: MEDICARE

## 2023-03-13 ENCOUNTER — TELEPHONE (OUTPATIENT)
Dept: ORTHOPEDICS CLINIC | Facility: CLINIC | Age: 83
End: 2023-03-13

## 2023-03-13 ENCOUNTER — PATIENT MESSAGE (OUTPATIENT)
Dept: FAMILY MEDICINE CLINIC | Facility: CLINIC | Age: 83
End: 2023-03-13

## 2023-03-13 VITALS
HEIGHT: 62 IN | HEART RATE: 60 BPM | BODY MASS INDEX: 32.76 KG/M2 | WEIGHT: 178 LBS | OXYGEN SATURATION: 98 % | TEMPERATURE: 98 F | DIASTOLIC BLOOD PRESSURE: 70 MMHG | RESPIRATION RATE: 16 BRPM | SYSTOLIC BLOOD PRESSURE: 136 MMHG

## 2023-03-13 DIAGNOSIS — E11.65 TYPE 2 DIABETES MELLITUS WITH HYPERGLYCEMIA, WITHOUT LONG-TERM CURRENT USE OF INSULIN (HCC): ICD-10-CM

## 2023-03-13 DIAGNOSIS — M17.11 PRIMARY OSTEOARTHRITIS OF RIGHT KNEE: ICD-10-CM

## 2023-03-13 DIAGNOSIS — E78.00 PURE HYPERCHOLESTEROLEMIA: ICD-10-CM

## 2023-03-13 DIAGNOSIS — E55.9 VITAMIN D DEFICIENCY: ICD-10-CM

## 2023-03-13 DIAGNOSIS — E03.9 ACQUIRED HYPOTHYROIDISM: ICD-10-CM

## 2023-03-13 DIAGNOSIS — I10 BENIGN HYPERTENSION: ICD-10-CM

## 2023-03-13 DIAGNOSIS — Z00.00 MEDICARE ANNUAL WELLNESS VISIT, SUBSEQUENT: Primary | ICD-10-CM

## 2023-03-13 DIAGNOSIS — I10 PRIMARY HYPERTENSION: ICD-10-CM

## 2023-03-13 DIAGNOSIS — R20.0 NUMBNESS OF HAND: ICD-10-CM

## 2023-03-13 DIAGNOSIS — M25.561 RIGHT KNEE PAIN, UNSPECIFIED CHRONICITY: Primary | ICD-10-CM

## 2023-03-13 DIAGNOSIS — E89.2 HISTORY OF PARATHYROIDECTOMY (HCC): ICD-10-CM

## 2023-03-13 DIAGNOSIS — G56.00 CARPAL TUNNEL SYNDROME, UNSPECIFIED LATERALITY: Primary | ICD-10-CM

## 2023-03-13 DIAGNOSIS — M48.062 SPINAL STENOSIS OF LUMBAR REGION WITH NEUROGENIC CLAUDICATION: ICD-10-CM

## 2023-03-13 DIAGNOSIS — E66.9 OBESITY (BMI 30.0-34.9): ICD-10-CM

## 2023-03-13 DIAGNOSIS — M17.11 ARTHRITIS OF RIGHT KNEE: ICD-10-CM

## 2023-03-13 DIAGNOSIS — Z85.3 HISTORY OF BREAST CANCER: ICD-10-CM

## 2023-03-13 DIAGNOSIS — Z86.39 HISTORY OF HYPERPARATHYROIDISM: ICD-10-CM

## 2023-03-13 LAB
ALBUMIN SERPL-MCNC: 3.5 G/DL (ref 3.4–5)
ALBUMIN/GLOB SERPL: 1 {RATIO} (ref 1–2)
ALP LIVER SERPL-CCNC: 87 U/L
ALT SERPL-CCNC: 26 U/L
ANION GAP SERPL CALC-SCNC: 5 MMOL/L (ref 0–18)
AST SERPL-CCNC: 17 U/L (ref 15–37)
BASOPHILS # BLD AUTO: 0.04 X10(3) UL (ref 0–0.2)
BASOPHILS NFR BLD AUTO: 0.7 %
BILIRUB SERPL-MCNC: 0.7 MG/DL (ref 0.1–2)
BUN BLD-MCNC: 13 MG/DL (ref 7–18)
CALCIUM BLD-MCNC: 9.1 MG/DL (ref 8.5–10.1)
CHLORIDE SERPL-SCNC: 107 MMOL/L (ref 98–112)
CHOLEST SERPL-MCNC: 185 MG/DL (ref ?–200)
CO2 SERPL-SCNC: 27 MMOL/L (ref 21–32)
CREAT BLD-MCNC: 0.84 MG/DL
CREAT UR-SCNC: 156 MG/DL
EOSINOPHIL # BLD AUTO: 0.12 X10(3) UL (ref 0–0.7)
EOSINOPHIL NFR BLD AUTO: 2.2 %
ERYTHROCYTE [DISTWIDTH] IN BLOOD BY AUTOMATED COUNT: 13.1 %
EST. AVERAGE GLUCOSE BLD GHB EST-MCNC: 194 MG/DL (ref 68–126)
FASTING PATIENT LIPID ANSWER: YES
FASTING STATUS PATIENT QL REPORTED: YES
GFR SERPLBLD BASED ON 1.73 SQ M-ARVRAT: 69 ML/MIN/1.73M2 (ref 60–?)
GLOBULIN PLAS-MCNC: 3.5 G/DL (ref 2.8–4.4)
GLUCOSE BLD-MCNC: 156 MG/DL (ref 70–99)
HBA1C MFR BLD: 8.4 % (ref ?–5.7)
HCT VFR BLD AUTO: 42.5 %
HDLC SERPL-MCNC: 63 MG/DL (ref 40–59)
HGB BLD-MCNC: 13.4 G/DL
IMM GRANULOCYTES # BLD AUTO: 0.02 X10(3) UL (ref 0–1)
IMM GRANULOCYTES NFR BLD: 0.4 %
LDLC SERPL CALC-MCNC: 98 MG/DL (ref ?–100)
LYMPHOCYTES # BLD AUTO: 1.28 X10(3) UL (ref 1–4)
LYMPHOCYTES NFR BLD AUTO: 24 %
MCH RBC QN AUTO: 29.8 PG (ref 26–34)
MCHC RBC AUTO-ENTMCNC: 31.5 G/DL (ref 31–37)
MCV RBC AUTO: 94.7 FL
MICROALBUMIN UR-MCNC: 1.58 MG/DL
MICROALBUMIN/CREAT 24H UR-RTO: 10.1 UG/MG (ref ?–30)
MONOCYTES # BLD AUTO: 0.51 X10(3) UL (ref 0.1–1)
MONOCYTES NFR BLD AUTO: 9.6 %
NEUTROPHILS # BLD AUTO: 3.37 X10 (3) UL (ref 1.5–7.7)
NEUTROPHILS # BLD AUTO: 3.37 X10(3) UL (ref 1.5–7.7)
NEUTROPHILS NFR BLD AUTO: 63.1 %
NONHDLC SERPL-MCNC: 122 MG/DL (ref ?–130)
OSMOLALITY SERPL CALC.SUM OF ELEC: 291 MOSM/KG (ref 275–295)
PLATELET # BLD AUTO: 210 10(3)UL (ref 150–450)
POTASSIUM SERPL-SCNC: 3.8 MMOL/L (ref 3.5–5.1)
PROT SERPL-MCNC: 7 G/DL (ref 6.4–8.2)
RBC # BLD AUTO: 4.49 X10(6)UL
SODIUM SERPL-SCNC: 139 MMOL/L (ref 136–145)
TRIGL SERPL-MCNC: 139 MG/DL (ref 30–149)
TSI SER-ACNC: 2.09 MIU/ML (ref 0.36–3.74)
VIT D+METAB SERPL-MCNC: 26.1 NG/ML (ref 30–100)
VLDLC SERPL CALC-MCNC: 23 MG/DL (ref 0–30)
WBC # BLD AUTO: 5.3 X10(3) UL (ref 4–11)

## 2023-03-13 PROCEDURE — 82570 ASSAY OF URINE CREATININE: CPT

## 2023-03-13 PROCEDURE — 83036 HEMOGLOBIN GLYCOSYLATED A1C: CPT

## 2023-03-13 PROCEDURE — 80061 LIPID PANEL: CPT

## 2023-03-13 PROCEDURE — 84443 ASSAY THYROID STIM HORMONE: CPT

## 2023-03-13 PROCEDURE — 82306 VITAMIN D 25 HYDROXY: CPT

## 2023-03-13 PROCEDURE — 80053 COMPREHEN METABOLIC PANEL: CPT

## 2023-03-13 PROCEDURE — 36415 COLL VENOUS BLD VENIPUNCTURE: CPT

## 2023-03-13 PROCEDURE — 85025 COMPLETE CBC W/AUTO DIFF WBC: CPT

## 2023-03-13 PROCEDURE — 82043 UR ALBUMIN QUANTITATIVE: CPT

## 2023-03-13 NOTE — PATIENT INSTRUCTIONS
Dr Foley [address], [place], [address] [facility]  Tel:[phone]  FAX: [phone]
WDL

## 2023-03-14 NOTE — TELEPHONE ENCOUNTER
From: John Bermudez  To: Ky Regalado MD  Sent: 3/13/2023 6:42 PM CDT  Subject: EMG for Jennifer Reyesvenkat Marina, I forgot to ask about my results I had on 1-31-23 for an EMG. Never received the test results.   Thank Lisandro Tidwell

## 2023-03-14 NOTE — PROGRESS NOTES
Vit. D 2000U a day, if already on supplement 5000U a day for next 3-4 months.sugar is little worse, ok to do better diet. recheck A1C and vit. D in 3 months. otherwise very good results

## 2023-03-15 ENCOUNTER — TELEPHONE (OUTPATIENT)
Dept: FAMILY MEDICINE CLINIC | Facility: CLINIC | Age: 83
End: 2023-03-15

## 2023-03-15 DIAGNOSIS — E55.9 VITAMIN D DEFICIENCY: ICD-10-CM

## 2023-03-15 DIAGNOSIS — E11.65 TYPE 2 DIABETES MELLITUS WITH HYPERGLYCEMIA, WITHOUT LONG-TERM CURRENT USE OF INSULIN (HCC): Primary | ICD-10-CM

## 2023-03-15 NOTE — TELEPHONE ENCOUNTER
----- Message from Jonathan Adkins MD sent at 3/14/2023 10:17 AM CDT -----  Vit. D 2000U a day, if already on supplement 5000U a day for next 3-4 months.sugar is little worse, ok to do better diet. recheck A1C and vit. D in 3 months. otherwise very good results

## 2023-03-15 NOTE — TELEPHONE ENCOUNTER
Lab orders placed. Notified the patient's daughter of the below lab results and orders. Patient's daughter verbalized understanding. Stated that the patient would work on diet modifications. Will start taking vitamin D 2,000 units daily. Will repeat labs. Answered all questions at this time.

## 2023-03-23 ENCOUNTER — TELEPHONE (OUTPATIENT)
Dept: PAIN CLINIC | Facility: CLINIC | Age: 83
End: 2023-03-23

## 2023-03-23 ENCOUNTER — OFFICE VISIT (OUTPATIENT)
Dept: PAIN CLINIC | Facility: CLINIC | Age: 83
End: 2023-03-23
Payer: MEDICARE

## 2023-03-23 VITALS — HEART RATE: 63 BPM | DIASTOLIC BLOOD PRESSURE: 70 MMHG | OXYGEN SATURATION: 97 % | SYSTOLIC BLOOD PRESSURE: 125 MMHG

## 2023-03-23 DIAGNOSIS — M48.062 SPINAL STENOSIS OF LUMBAR REGION WITH NEUROGENIC CLAUDICATION: Primary | ICD-10-CM

## 2023-03-23 PROCEDURE — 99214 OFFICE O/P EST MOD 30 MIN: CPT | Performed by: PHYSICIAN ASSISTANT

## 2023-03-23 NOTE — TELEPHONE ENCOUNTER
Question Answer   Anesthesia Type Sedation   Provider [facility] Procedure Lab   Procedure Lumbar ESPERANZA   CPT (Hit enter after each entry) NJX INTERLAMINAR LMBR/SAC   Medications to hold asa   Medical clearance requested (will send to Pain Navigator) No   Patient has Medicare coverage?  Yes

## 2023-03-23 NOTE — PROGRESS NOTES
procedure: LESI  Date: 3/2/2023  Percentage of relief obtained: 50 %  Current Pain Score: 0 when sitting   6-7 when standing and moving

## 2023-04-06 ENCOUNTER — HOSPITAL ENCOUNTER (OUTPATIENT)
Facility: HOSPITAL | Age: 83
Setting detail: HOSPITAL OUTPATIENT SURGERY
Discharge: HOME | End: 2023-04-06
Attending: ANESTHESIOLOGY | Admitting: ANESTHESIOLOGY
Payer: MEDICARE

## 2023-04-06 ENCOUNTER — APPOINTMENT (OUTPATIENT)
Dept: GENERAL RADIOLOGY | Facility: HOSPITAL | Age: 83
End: 2023-04-06
Attending: ANESTHESIOLOGY
Payer: MEDICARE

## 2023-04-06 VITALS
WEIGHT: 178 LBS | TEMPERATURE: 97 F | OXYGEN SATURATION: 97 % | DIASTOLIC BLOOD PRESSURE: 61 MMHG | HEART RATE: 63 BPM | SYSTOLIC BLOOD PRESSURE: 140 MMHG | HEIGHT: 62 IN | RESPIRATION RATE: 18 BRPM | BODY MASS INDEX: 32.76 KG/M2

## 2023-04-06 PROCEDURE — 62323 NJX INTERLAMINAR LMBR/SAC: CPT | Performed by: ANESTHESIOLOGY

## 2023-04-06 PROCEDURE — 3E0R33Z INTRODUCTION OF ANTI-INFLAMMATORY INTO SPINAL CANAL, PERCUTANEOUS APPROACH: ICD-10-PCS | Performed by: ANESTHESIOLOGY

## 2023-04-06 PROCEDURE — 3E0R3BZ INTRODUCTION OF ANESTHETIC AGENT INTO SPINAL CANAL, PERCUTANEOUS APPROACH: ICD-10-PCS | Performed by: ANESTHESIOLOGY

## 2023-04-06 RX ORDER — NICOTINE POLACRILEX 4 MG
15 LOZENGE BUCCAL
Status: DISCONTINUED | OUTPATIENT
Start: 2023-04-06 | End: 2023-04-06

## 2023-04-06 RX ORDER — LIDOCAINE HYDROCHLORIDE 10 MG/ML
INJECTION, SOLUTION EPIDURAL; INFILTRATION; INTRACAUDAL; PERINEURAL
Status: DISCONTINUED | OUTPATIENT
Start: 2023-04-06 | End: 2023-04-06

## 2023-04-06 RX ORDER — SODIUM CHLORIDE 9 MG/ML
INJECTION INTRAVENOUS
Status: DISCONTINUED | OUTPATIENT
Start: 2023-04-06 | End: 2023-04-06

## 2023-04-06 RX ORDER — INSULIN ASPART 100 [IU]/ML
3 INJECTION, SOLUTION INTRAVENOUS; SUBCUTANEOUS ONCE
Status: DISCONTINUED | OUTPATIENT
Start: 2023-04-06 | End: 2023-04-06

## 2023-04-06 RX ORDER — MIDAZOLAM HYDROCHLORIDE 1 MG/ML
INJECTION INTRAMUSCULAR; INTRAVENOUS
Status: DISCONTINUED | OUTPATIENT
Start: 2023-04-06 | End: 2023-04-06

## 2023-04-06 RX ORDER — DEXAMETHASONE SODIUM PHOSPHATE 10 MG/ML
INJECTION, SOLUTION INTRAMUSCULAR; INTRAVENOUS
Status: DISCONTINUED | OUTPATIENT
Start: 2023-04-06 | End: 2023-04-06

## 2023-04-06 RX ORDER — DIPHENHYDRAMINE HYDROCHLORIDE 50 MG/ML
50 INJECTION INTRAMUSCULAR; INTRAVENOUS ONCE AS NEEDED
Status: DISCONTINUED | OUTPATIENT
Start: 2023-04-06 | End: 2023-04-06

## 2023-04-06 RX ORDER — ONDANSETRON 2 MG/ML
4 INJECTION INTRAMUSCULAR; INTRAVENOUS ONCE AS NEEDED
Status: DISCONTINUED | OUTPATIENT
Start: 2023-04-06 | End: 2023-04-06

## 2023-04-06 RX ORDER — NICOTINE POLACRILEX 4 MG
30 LOZENGE BUCCAL
Status: DISCONTINUED | OUTPATIENT
Start: 2023-04-06 | End: 2023-04-06

## 2023-04-06 RX ORDER — DEXTROSE MONOHYDRATE 25 G/50ML
50 INJECTION, SOLUTION INTRAVENOUS
Status: DISCONTINUED | OUTPATIENT
Start: 2023-04-06 | End: 2023-04-06

## 2023-04-06 RX ORDER — SODIUM CHLORIDE, SODIUM LACTATE, POTASSIUM CHLORIDE, CALCIUM CHLORIDE 600; 310; 30; 20 MG/100ML; MG/100ML; MG/100ML; MG/100ML
100 INJECTION, SOLUTION INTRAVENOUS CONTINUOUS
Status: DISCONTINUED | OUTPATIENT
Start: 2023-04-06 | End: 2023-04-06

## 2023-04-06 NOTE — OPERATIVE REPORT
[facility]  Operative Report  2023     [facility] Patient Status:  Hospital Outpatient Surgery    1940 MRN [id number]   Location [address] [address] Attending Kate Chavez MD   Hosp Day # 0 PCP Pedro Luis Jacome MD     Indication: Ashley De Luna is a 80year old female with spinal stenosis    Imaging: MRI reviewed    Preoperative Diagnosis:  Spinal stenosis of lumbar region with neurogenic claudication [M48.062]    Postoperative Diagnosis: Same as above. Procedure performed: LUMBAR INTERLAMINAR EPIDURAL INJECTION WITH SEDATION    Anesthesia: Local and IV Sedation. EBL: Less than 1 ml. Procedure Description:  After reviewing the patient's history and performing a focused physical examination, the diagnosis and positive previous diagnostic tests were confirmed and contraindications such as infection and coagulopathy were ruled out. Following review of allergies and potential side effects and complications, including but not necessarily limited to infection, allergic reaction, local tissue breakdown, nerve injury, post-dural puncture headache and paresis, the patient consented for the procedure. The patient was brought to the procedure room in prone position. After comprehensive monitors were applied and IV access in the patient's arm, moderate intravenous sedation was given with versed and fentanyl. Moderate sedation was given for 7 minutes. After sterile prep lumbar spine, the L5-S1 interspace was identified with the help of fluoroscopy. Local anesthetic was given by a 25 gauge 1.5 inch needle with 1% lidocaine in that space level. Thereafter, a 20 gauge Tuohy needle was introduced and advanced under fluoroscopy. The epidural space was accessed by using loss of resistance to air technique. The needle position was confirmed with AP and lateral view under fluoroscopy. Omnipaque 180 was injected in 1 mL volume. A good epidurogram was obtained.   Thereafter, dexamethasone 10 mg with normal saline of 5 mL in total volume of 6 mL was injected through the Tuohy needle. The needle was flushed with 1 mL lidocaine. The needle was withdrawn with the stylet intact in situ. The needle's tip was intact. The patient tolerated the procedure very well without significant immediate complication. The patient's back was cleaned and sterile dressing was applied. The patient was discharged with an instruction to a responsible adult after discharge criteria were met. The patient was advised to contact us should any problems happen. The patient was also informed to go to the emergency room immediately if experiencing severe numbness or weakness in the extremities or experiencing bowel or bladder incontinence. Complications: None. Follow up: The patient was followed in the pain clinic as needed basis.           Daniel Smallwood MD

## 2023-04-07 ENCOUNTER — TELEPHONE (OUTPATIENT)
Dept: PAIN CLINIC | Facility: CLINIC | Age: 83
End: 2023-04-07

## 2023-04-07 NOTE — TELEPHONE ENCOUNTER
Courtmarissa called placed to patient for post procedure follow up. Patient stated doing very well. Informed patient that soreness is to be expected after the procedure. Educated patient that it takes 3-5 days for the steroid to be effective and to allow adequate time for medication to work. Encouraged patient to alternate ice and heat and to take medications as prescribed. Pt verbalized understanding to call with any questions or concerns.       Procedure: LUMBAR INTERLAMINAR EPIDURAL INJECTION WITH SEDATION  Date: 04/06/23  Follow up Visit Scheduled: 04/20/23

## 2023-04-14 ENCOUNTER — TELEPHONE (OUTPATIENT)
Dept: SURGERY | Facility: CLINIC | Age: 83
End: 2023-04-14

## 2023-04-14 NOTE — TELEPHONE ENCOUNTER
Spoke with Rebecca Dorantes on 1/30/2023 to verify eligibility of insurance; insurance was verfied by phone

## 2023-04-20 ENCOUNTER — OFFICE VISIT (OUTPATIENT)
Dept: PAIN CLINIC | Facility: CLINIC | Age: 83
End: 2023-04-20
Payer: MEDICARE

## 2023-04-20 ENCOUNTER — TELEPHONE (OUTPATIENT)
Dept: PAIN CLINIC | Facility: CLINIC | Age: 83
End: 2023-04-20

## 2023-04-20 VITALS
HEART RATE: 84 BPM | BODY MASS INDEX: 33 KG/M2 | WEIGHT: 178 LBS | DIASTOLIC BLOOD PRESSURE: 78 MMHG | OXYGEN SATURATION: 98 % | SYSTOLIC BLOOD PRESSURE: 134 MMHG

## 2023-04-20 DIAGNOSIS — M48.061 SPINAL STENOSIS OF LUMBAR REGION WITHOUT NEUROGENIC CLAUDICATION: Primary | ICD-10-CM

## 2023-04-20 DIAGNOSIS — M47.816 LUMBAR SPONDYLOSIS: ICD-10-CM

## 2023-04-20 DIAGNOSIS — M47.816 LUMBAR SPONDYLOSIS: Primary | ICD-10-CM

## 2023-04-20 PROCEDURE — 99214 OFFICE O/P EST MOD 30 MIN: CPT | Performed by: PHYSICIAN ASSISTANT

## 2023-04-20 NOTE — TELEPHONE ENCOUNTER
Question Answer   Anesthesia Type Sedation   Provider [facility] Procedure Lab   Procedure Facet   Laterality/Level right L3/4, L4/5, L5/S1   CPT (Hit enter after each entry) INJ PARAVERT F JNT L/S 1 LEV    INJ PARAVERT F JNT L/S 2 LEV    INJ PARAVERT F JNT L/S 3 LEV   Medical clearance requested (will send to Pain Navigator) No   Patient has Medicare coverage?  Yes

## 2023-04-20 NOTE — PROGRESS NOTES
Last procedure: LUMBAR INTERLAMINAR EPIDURAL INJECTION WITH SEDATION  Date: 04/06/23  Percentage of relief obtained: 80%  Duration of relief: a week    Current Pain Score: 6

## 2023-04-21 ENCOUNTER — OFFICE VISIT (OUTPATIENT)
Dept: ORTHOPEDICS CLINIC | Facility: CLINIC | Age: 83
End: 2023-04-21
Payer: MEDICARE

## 2023-04-21 ENCOUNTER — HOSPITAL ENCOUNTER (OUTPATIENT)
Dept: GENERAL RADIOLOGY | Age: 83
Discharge: HOME | End: 2023-04-21
Attending: ORTHOPAEDIC SURGERY
Payer: MEDICARE

## 2023-04-21 VITALS — HEIGHT: 62 IN | BODY MASS INDEX: 32.76 KG/M2 | WEIGHT: 178 LBS

## 2023-04-21 DIAGNOSIS — M25.561 RIGHT KNEE PAIN, UNSPECIFIED CHRONICITY: ICD-10-CM

## 2023-04-21 DIAGNOSIS — M17.11 PRIMARY OSTEOARTHRITIS OF RIGHT KNEE: Primary | ICD-10-CM

## 2023-04-21 PROCEDURE — 1125F AMNT PAIN NOTED PAIN PRSNT: CPT | Performed by: PHYSICIAN ASSISTANT

## 2023-04-21 PROCEDURE — 73564 X-RAY EXAM KNEE 4 OR MORE: CPT | Performed by: ORTHOPAEDIC SURGERY

## 2023-04-21 PROCEDURE — 99214 OFFICE O/P EST MOD 30 MIN: CPT | Performed by: PHYSICIAN ASSISTANT

## 2023-04-27 ENCOUNTER — OFFICE VISIT (OUTPATIENT)
Dept: SURGERY | Facility: CLINIC | Age: 83
End: 2023-04-27
Payer: MEDICARE

## 2023-04-27 VITALS
SYSTOLIC BLOOD PRESSURE: 118 MMHG | HEART RATE: 80 BPM | HEIGHT: 65 IN | WEIGHT: 178 LBS | BODY MASS INDEX: 29.66 KG/M2 | DIASTOLIC BLOOD PRESSURE: 80 MMHG

## 2023-04-27 DIAGNOSIS — G56.03 BILATERAL CARPAL TUNNEL SYNDROME: Primary | ICD-10-CM

## 2023-04-27 PROBLEM — I51.81 TAKOTSUBO CARDIOMYOPATHY: Status: ACTIVE | Noted: 2021-02-26

## 2023-04-27 PROBLEM — I65.23 ASYMPTOMATIC CAROTID ARTERY STENOSIS, BILATERAL: Status: ACTIVE | Noted: 2018-10-22

## 2023-04-27 PROCEDURE — 99212 OFFICE O/P EST SF 10 MIN: CPT | Performed by: NEUROLOGICAL SURGERY

## 2023-04-27 RX ORDER — MELOXICAM 7.5 MG/1
7.5 TABLET ORAL DAILY
Qty: 30 TABLET | Refills: 1 | Status: SHIPPED | OUTPATIENT
Start: 2023-04-27

## 2023-04-27 NOTE — PROGRESS NOTES
New patient:  Reason for visit: carpal tunnel     Numeric Rating Scale: (No Pain) 0  to  10 (Worst Pain)        Pain at Present:  0/10                                                                                                                       Distribution of Pain:    bilateral has N/T in DUGLAS hands.       Had a EMG done on 2.2.23

## 2023-04-28 ENCOUNTER — TELEPHONE (OUTPATIENT)
Dept: ORTHOPEDICS CLINIC | Facility: CLINIC | Age: 83
End: 2023-04-28

## 2023-05-01 ENCOUNTER — LAB ENCOUNTER (OUTPATIENT)
Dept: LAB | Age: 83
End: 2023-05-01
Attending: INTERNAL MEDICINE
Payer: MEDICARE

## 2023-05-01 DIAGNOSIS — E78.00 PURE HYPERCHOLESTEROLEMIA: Primary | ICD-10-CM

## 2023-05-01 DIAGNOSIS — I10 ESSENTIAL HYPERTENSION, BENIGN: ICD-10-CM

## 2023-05-01 LAB
ALBUMIN SERPL-MCNC: 3.9 G/DL (ref 3.4–5)
ALBUMIN/GLOB SERPL: 1.2 {RATIO} (ref 1–2)
ALP LIVER SERPL-CCNC: 87 U/L
ALT SERPL-CCNC: 19 U/L
ANION GAP SERPL CALC-SCNC: 1 MMOL/L (ref 0–18)
AST SERPL-CCNC: 19 U/L (ref 15–37)
BILIRUB SERPL-MCNC: 0.8 MG/DL (ref 0.1–2)
BUN BLD-MCNC: 12 MG/DL (ref 7–18)
CALCIUM BLD-MCNC: 9.4 MG/DL (ref 8.5–10.1)
CHLORIDE SERPL-SCNC: 111 MMOL/L (ref 98–112)
CHOLEST SERPL-MCNC: 182 MG/DL (ref ?–200)
CO2 SERPL-SCNC: 28 MMOL/L (ref 21–32)
CREAT BLD-MCNC: 0.75 MG/DL
FASTING PATIENT LIPID ANSWER: YES
FASTING STATUS PATIENT QL REPORTED: YES
GFR SERPLBLD BASED ON 1.73 SQ M-ARVRAT: 79 ML/MIN/1.73M2 (ref 60–?)
GLOBULIN PLAS-MCNC: 3.2 G/DL (ref 2.8–4.4)
GLUCOSE BLD-MCNC: 144 MG/DL (ref 70–99)
HDLC SERPL-MCNC: 58 MG/DL (ref 40–59)
LDLC SERPL CALC-MCNC: 102 MG/DL (ref ?–100)
NONHDLC SERPL-MCNC: 124 MG/DL (ref ?–130)
OSMOLALITY SERPL CALC.SUM OF ELEC: 292 MOSM/KG (ref 275–295)
POTASSIUM SERPL-SCNC: 4.3 MMOL/L (ref 3.5–5.1)
PROT SERPL-MCNC: 7.1 G/DL (ref 6.4–8.2)
SODIUM SERPL-SCNC: 140 MMOL/L (ref 136–145)
TRIGL SERPL-MCNC: 127 MG/DL (ref 30–149)
VLDLC SERPL CALC-MCNC: 21 MG/DL (ref 0–30)

## 2023-05-01 PROCEDURE — 80053 COMPREHEN METABOLIC PANEL: CPT

## 2023-05-01 PROCEDURE — 80061 LIPID PANEL: CPT

## 2023-05-01 PROCEDURE — 36415 COLL VENOUS BLD VENIPUNCTURE: CPT

## 2023-05-01 NOTE — TELEPHONE ENCOUNTER
Lvm for patient to call back and schedule Injection, with CHRIS MARTELL . Patient needs to be scheduled 3 weeks ahead to assure that the medication will be there for the appt. Please forward TE back to me with Date,Time and Location.     Thomas Elliott

## 2023-05-02 ENCOUNTER — APPOINTMENT (OUTPATIENT)
Dept: GENERAL RADIOLOGY | Facility: HOSPITAL | Age: 83
End: 2023-05-02
Attending: ANESTHESIOLOGY
Payer: MEDICARE

## 2023-05-02 ENCOUNTER — HOSPITAL ENCOUNTER (OUTPATIENT)
Facility: HOSPITAL | Age: 83
Setting detail: HOSPITAL OUTPATIENT SURGERY
Discharge: HOME | End: 2023-05-02
Attending: ANESTHESIOLOGY | Admitting: ANESTHESIOLOGY
Payer: MEDICARE

## 2023-05-02 VITALS
OXYGEN SATURATION: 93 % | HEART RATE: 58 BPM | RESPIRATION RATE: 18 BRPM | DIASTOLIC BLOOD PRESSURE: 73 MMHG | TEMPERATURE: 98 F | SYSTOLIC BLOOD PRESSURE: 116 MMHG

## 2023-05-02 PROCEDURE — 3E0R33Z INTRODUCTION OF ANTI-INFLAMMATORY INTO SPINAL CANAL, PERCUTANEOUS APPROACH: ICD-10-PCS | Performed by: ANESTHESIOLOGY

## 2023-05-02 PROCEDURE — 3E0R3BZ INTRODUCTION OF ANESTHETIC AGENT INTO SPINAL CANAL, PERCUTANEOUS APPROACH: ICD-10-PCS | Performed by: ANESTHESIOLOGY

## 2023-05-02 PROCEDURE — 64493 INJ PARAVERT F JNT L/S 1 LEV: CPT | Performed by: ANESTHESIOLOGY

## 2023-05-02 PROCEDURE — 64494 INJ PARAVERT F JNT L/S 2 LEV: CPT | Performed by: ANESTHESIOLOGY

## 2023-05-02 RX ORDER — LIDOCAINE HYDROCHLORIDE 10 MG/ML
INJECTION, SOLUTION EPIDURAL; INFILTRATION; INTRACAUDAL; PERINEURAL
Status: DISCONTINUED | OUTPATIENT
Start: 2023-05-02 | End: 2023-05-02

## 2023-05-02 RX ORDER — ONDANSETRON 2 MG/ML
4 INJECTION INTRAMUSCULAR; INTRAVENOUS ONCE AS NEEDED
Status: DISCONTINUED | OUTPATIENT
Start: 2023-05-02 | End: 2023-05-02

## 2023-05-02 RX ORDER — SODIUM CHLORIDE, SODIUM LACTATE, POTASSIUM CHLORIDE, CALCIUM CHLORIDE 600; 310; 30; 20 MG/100ML; MG/100ML; MG/100ML; MG/100ML
100 INJECTION, SOLUTION INTRAVENOUS CONTINUOUS
Status: DISCONTINUED | OUTPATIENT
Start: 2023-05-02 | End: 2023-05-02

## 2023-05-02 RX ORDER — BUPIVACAINE HYDROCHLORIDE 5 MG/ML
INJECTION, SOLUTION EPIDURAL; INTRACAUDAL
Status: DISCONTINUED | OUTPATIENT
Start: 2023-05-02 | End: 2023-05-02

## 2023-05-02 RX ORDER — MIDAZOLAM HYDROCHLORIDE 1 MG/ML
INJECTION INTRAMUSCULAR; INTRAVENOUS
Status: DISCONTINUED | OUTPATIENT
Start: 2023-05-02 | End: 2023-05-02

## 2023-05-02 RX ORDER — ONDANSETRON 2 MG/ML
4 INJECTION INTRAMUSCULAR; INTRAVENOUS ONCE AS NEEDED
OUTPATIENT
Start: 2023-05-02 | End: 2023-05-02

## 2023-05-02 RX ORDER — DIPHENHYDRAMINE HYDROCHLORIDE 50 MG/ML
50 INJECTION INTRAMUSCULAR; INTRAVENOUS ONCE AS NEEDED
OUTPATIENT
Start: 2023-05-02 | End: 2023-05-02

## 2023-05-02 RX ORDER — METHYLPREDNISOLONE ACETATE 40 MG/ML
INJECTION, SUSPENSION INTRA-ARTICULAR; INTRALESIONAL; INTRAMUSCULAR; SOFT TISSUE
Status: DISCONTINUED | OUTPATIENT
Start: 2023-05-02 | End: 2023-05-02

## 2023-05-02 NOTE — DISCHARGE INSTRUCTIONS
You have been given medication that makes you sleepy. This may have included both pain medication and sleeping medication. Most of the effects have worn off but you may still have some drowsiness for the next 6 to 8 hours. Home Care  Follow these guidelines when you get home:    --Don't drink any alcohol for the next 24 hours. --Don't drive, operate dangerous machinery, make important business or personal    decisions, or sign legal documents during the next 24 hours. Follow Up Care  Follow up with your healthcare provider if you are not alert and back to your usual level of activity within 12 hours    When to seek medical advice  Call your healthcare provider right away if any of these occur:    --Drowsiness that gets worse  --Weakness or dizziness that gets worse  --Repeated vomiting  --You can not be awakened    Home Care Instructions Following Your Pain Procedure     [place],  It has been a pleasure to have you as our patient. To help you at home, you must follow these general discharge instructions. We will review these with you before you are discharged. It is our hope that you have a complete and uneventful recovery from our procedure. General Instructions:  What to Expect:  Bandages from your procedure today can be removed when you get home. Please avoid soaking and/or swimming for 24 hours. Showering is okay  It is normal to have increased pain symptoms and/or pain at injection site for up to 3-5 days after procedure, you can use heat or ice (20 minutes on 20 minutes off) for comfort. You may experience some temporary side effects which may include restlessness or insomnia, flushing of the face, or heart palpitations. Please contact the provider if these symptoms do not resolve within 3-4 days. Lightheadedness or nausea may occur and should resolve within 24 to 48 hours.   If you develop a headache after treatment, rest, drink fluids (with caffeine, if possible) and take mild over-the-counter pain medication. If the headache does not improve with the above treatment, contact the physician. Home Medications:  Resume all previously prescribed medication. Please avoid taking NSAIDs (Non-Steriodal Anti-Inflammatory Drugs) such as:  Ibuprofen ( Advil, Motrin) Aleve (Naproxen), Diclofenac, Meloxicam for 6 hours after procedure. If you are on Coumadin (Warfarin) or any other anti-coagulant (or \"blood thinning\") medication such as Plavix (Clopidogrel), Xarelto (Rivaroxaban), Eliquis (Apixaban), Effient (Prasugrel) etc., restart on the following day from the procedure unless otherwise directed by your provider. If you are a diabetic, please increase the frequency of your glucose monitoring after the procedure as steroids may cause a temporary (2-3 day) increase in your blood sugar. Contact your primary care physician if your blood sugar remains elevated as you may require some medication adjustment. Diet:  Resume your regular diet as tolerated. Activity: We recommend that you relax and rest during the rest of your procedure day. If you feel weakness in your arms or legs do not drive. Follow-up Appointment  Please schedule a follow-up visit within 3 to 4 weeks after your last procedure date. Question or Concerns:  Feel free to call our office with any questions or concerns at [phone] (option #2)    [place]  Thank you for coming to [facility] for your procedure. The nurses try very hard to make sure you receive the best care possible. Your trust in them as well as us is greatly appreciated.     Thanks so much,   Dr. Elspeth Hammans

## 2023-05-02 NOTE — OPERATIVE REPORT
[facility]  Operative Report  2023     Minor Conteh Patient Status:  Hospital Outpatient Surgery    1940 MRN [id number]   Location [address] [address] Attending Jl Ross MD   Hosp Day # 0 PCP Jaimie Gunderson MD     Indication: Brandan Navarro is a 80year old female with lumbar spondylosis    Imaging: Lumbar MRI    Preoperative Diagnosis:  Lumbar spondylosis [M47.816]    Postoperative Diagnosis: Same as above. Procedure performed: LUMBAR FACET INJECTION right L3/4, L4/5, L5/S1 with sedation     Anesthesia: Local and IV Sedation. EBL: Less than 1 ml. Procedure Description:  After reviewing the patient's history and performing a focused physical examination, the diagnosis was confirmed and contraindications such as infection and coagulopathy were ruled out. Following review of allergies and review of potential side effects and complications, including but not necessarily limited to infection, allergic reaction, local tissue breakdown, nerve injury, and paresis, the patient indicated they understood and agreed to proceed. After obtaining the informed consent, the patient was brought to the procedure room and monitored. Per my order and under my supervision, the patient was sedated with intermittent intravenous doses of versed and fentanyl. The vital signs were monitored and recorded by an experienced RN. The procedure started after the patient was adequately sedated. Moderate intravenous sedation was given for 7 minutes. In the prone position, following sterile prep and drape of the lumbar region, the corresponding facet joints were identified under fluoroscopy. The skin was anesthetized via 25-gauge 1.5\" needle with approximately 2 mL of 1% lidocaine. A 22-gauge 3.5\" Quincke spinal needle was introduced and advanced into each corresponding facet joint at right L3-L4, L4-5 and L5-S1 level atraumatically under fluoroscopic guidance.   Following negative aspiration for CSF and blood, approximately 1 mL of 1% lidocaine with 10 mg of methylprednisolone was injected into each joint without complication. The needle was withdrawn with stylet in situ after being flushed with 0.5 mL lidocaine. The patient tolerated procedure very well. The patient was observed until discharge criteria met. Discharge instructions were given and patient was released to a responsible adult. Complications: None. Follow up: The patient was followed in the pain clinic as needed basis.     Bryan Spencer MD

## 2023-05-03 ENCOUNTER — TELEPHONE (OUTPATIENT)
Dept: PAIN CLINIC | Facility: CLINIC | Age: 83
End: 2023-05-03

## 2023-05-03 NOTE — TELEPHONE ENCOUNTER
Pancho called placed to patient for post procedure follow up. Patient stated she is doing favorably well, pt states she slept when she arrived home and then slept all night. Pt reports she is experiencing very minute pain as compared to prior to injection. Informed patient that soreness is to be expected after the procedure. Educated patient that it takes 3-5 days for the steroid to be effective and to allow adequate time for medication to work. Encouraged patient to alternate ice and heat and to take medications as prescribed. Pt verbalized understanding to call with any questions or concerns.       Procedure: Lumbar Facets   Date: 5/2/23  Follow up Visit Scheduled: 5/16 @ 1330 w/ Nino Blankenship

## 2023-05-16 ENCOUNTER — TELEPHONE (OUTPATIENT)
Dept: PAIN CLINIC | Facility: CLINIC | Age: 83
End: 2023-05-16

## 2023-05-16 ENCOUNTER — OFFICE VISIT (OUTPATIENT)
Dept: PAIN CLINIC | Facility: CLINIC | Age: 83
End: 2023-05-16
Payer: MEDICARE

## 2023-05-16 VITALS — OXYGEN SATURATION: 98 % | HEART RATE: 62 BPM | SYSTOLIC BLOOD PRESSURE: 122 MMHG | DIASTOLIC BLOOD PRESSURE: 70 MMHG

## 2023-05-16 DIAGNOSIS — M47.816 LUMBAR SPONDYLOSIS: Primary | ICD-10-CM

## 2023-05-16 DIAGNOSIS — M48.062 SPINAL STENOSIS OF LUMBAR REGION WITH NEUROGENIC CLAUDICATION: ICD-10-CM

## 2023-05-16 PROCEDURE — 99214 OFFICE O/P EST MOD 30 MIN: CPT | Performed by: PHYSICIAN ASSISTANT

## 2023-05-16 NOTE — PROGRESS NOTES
Last procedure: LUMBAR FACET INJECTION right L3/4, L4/5, L5/S1 with sedation  Date: 5/2/23  Percentage of relief obtained: 60%   Duration of relief: 3-4 days, now at ~15% sustained relief    Current Pain Score: 0/10 sitting, 5/10 standing/walking

## 2023-05-16 NOTE — TELEPHONE ENCOUNTER
Question Answer   Anesthesia Type Sedation   Provider [facility] Procedure Lab   Procedure RFA   Laterality/Level right L3/4, L4/5, L5/S1   CPT (Hit enter after each entry) DESTROY LUMB/SAC FACET JNT    DESTROY L/S FACET JNT ADDL   Medical clearance requested (will send to Pain Navigator) No   Patient has Medicare coverage?  Yes

## 2023-05-20 DIAGNOSIS — R42 DIZZINESS: ICD-10-CM

## 2023-05-20 RX ORDER — LEVOTHYROXINE SODIUM 0.05 MG/1
TABLET ORAL
Qty: 90 TABLET | Refills: 0 | Status: SHIPPED | OUTPATIENT
Start: 2023-05-20

## 2023-05-22 ENCOUNTER — OFFICE VISIT (OUTPATIENT)
Dept: ORTHOPEDICS CLINIC | Facility: CLINIC | Age: 83
End: 2023-05-22
Payer: MEDICARE

## 2023-05-22 ENCOUNTER — TELEPHONE (OUTPATIENT)
Dept: SURGERY | Facility: CLINIC | Age: 83
End: 2023-05-22

## 2023-05-22 VITALS — HEART RATE: 67 BPM | BODY MASS INDEX: 32.39 KG/M2 | WEIGHT: 176 LBS | HEIGHT: 62 IN | OXYGEN SATURATION: 96 %

## 2023-05-22 DIAGNOSIS — M17.11 PRIMARY OSTEOARTHRITIS OF RIGHT KNEE: Primary | ICD-10-CM

## 2023-05-22 RX ORDER — BUSPIRONE HYDROCHLORIDE 5 MG/1
5 TABLET ORAL 2 TIMES DAILY
Qty: 180 TABLET | Refills: 0 | Status: SHIPPED | OUTPATIENT
Start: 2023-05-22

## 2023-05-22 NOTE — PROCEDURES
After informed consent, the patient's right knee was marked, locally anesthetized with skin refrigerant, prepped with topical antiseptic, and injected with 6mL of 48mg/6mL Synvisc One through the inferolateral portal.  A band-aid was applied. The patient tolerated the procedure well.     Karron Cogan, PA-C  [address] Orthopedic Surgery

## 2023-05-22 NOTE — TELEPHONE ENCOUNTER
Pt scheduled with Provider on 9/15/2023. Requesting refill for BUSPIRONE 5 MG Oral Tab; [facility][id number] Kevin Raphael, [address] [address] [phone], [phone]. Endorsed to RN for Provider.

## 2023-06-01 ENCOUNTER — APPOINTMENT (OUTPATIENT)
Dept: GENERAL RADIOLOGY | Facility: HOSPITAL | Age: 83
End: 2023-06-01
Attending: ANESTHESIOLOGY
Payer: MEDICARE

## 2023-06-01 ENCOUNTER — HOSPITAL ENCOUNTER (OUTPATIENT)
Facility: HOSPITAL | Age: 83
Setting detail: HOSPITAL OUTPATIENT SURGERY
Discharge: HOME | End: 2023-06-01
Attending: ANESTHESIOLOGY | Admitting: ANESTHESIOLOGY
Payer: MEDICARE

## 2023-06-01 VITALS
HEIGHT: 62 IN | OXYGEN SATURATION: 99 % | HEART RATE: 53 BPM | WEIGHT: 176 LBS | RESPIRATION RATE: 16 BRPM | SYSTOLIC BLOOD PRESSURE: 148 MMHG | TEMPERATURE: 98 F | DIASTOLIC BLOOD PRESSURE: 75 MMHG | BODY MASS INDEX: 32.39 KG/M2

## 2023-06-01 PROCEDURE — 015B3ZZ DESTRUCTION OF LUMBAR NERVE, PERCUTANEOUS APPROACH: ICD-10-PCS | Performed by: ANESTHESIOLOGY

## 2023-06-01 PROCEDURE — 64636 DESTROY L/S FACET JNT ADDL: CPT | Performed by: ANESTHESIOLOGY

## 2023-06-01 PROCEDURE — 64635 DESTROY LUMB/SAC FACET JNT: CPT | Performed by: ANESTHESIOLOGY

## 2023-06-01 RX ORDER — SODIUM CHLORIDE, SODIUM LACTATE, POTASSIUM CHLORIDE, CALCIUM CHLORIDE 600; 310; 30; 20 MG/100ML; MG/100ML; MG/100ML; MG/100ML
100 INJECTION, SOLUTION INTRAVENOUS CONTINUOUS
Status: DISCONTINUED | OUTPATIENT
Start: 2023-06-01 | End: 2023-06-01

## 2023-06-01 RX ORDER — LIDOCAINE HYDROCHLORIDE 10 MG/ML
INJECTION, SOLUTION EPIDURAL; INFILTRATION; INTRACAUDAL; PERINEURAL
Status: DISCONTINUED | OUTPATIENT
Start: 2023-06-01 | End: 2023-06-01

## 2023-06-01 RX ORDER — NICOTINE POLACRILEX 4 MG
15 LOZENGE BUCCAL
Status: DISCONTINUED | OUTPATIENT
Start: 2023-06-01 | End: 2023-06-01

## 2023-06-01 RX ORDER — INSULIN ASPART 100 [IU]/ML
3 INJECTION, SOLUTION INTRAVENOUS; SUBCUTANEOUS ONCE
Status: DISCONTINUED | OUTPATIENT
Start: 2023-06-01 | End: 2023-06-01

## 2023-06-01 RX ORDER — DEXTROSE MONOHYDRATE 25 G/50ML
50 INJECTION, SOLUTION INTRAVENOUS
Status: DISCONTINUED | OUTPATIENT
Start: 2023-06-01 | End: 2023-06-01

## 2023-06-01 RX ORDER — ONDANSETRON 2 MG/ML
4 INJECTION INTRAMUSCULAR; INTRAVENOUS ONCE AS NEEDED
Status: DISCONTINUED | OUTPATIENT
Start: 2023-06-01 | End: 2023-06-01

## 2023-06-01 RX ORDER — DIPHENHYDRAMINE HYDROCHLORIDE 50 MG/ML
50 INJECTION INTRAMUSCULAR; INTRAVENOUS ONCE AS NEEDED
Status: DISCONTINUED | OUTPATIENT
Start: 2023-06-01 | End: 2023-06-01

## 2023-06-01 RX ORDER — METHYLPREDNISOLONE ACETATE 40 MG/ML
INJECTION, SUSPENSION INTRA-ARTICULAR; INTRALESIONAL; INTRAMUSCULAR; SOFT TISSUE
Status: DISCONTINUED | OUTPATIENT
Start: 2023-06-01 | End: 2023-06-01

## 2023-06-01 RX ORDER — MIDAZOLAM HYDROCHLORIDE 1 MG/ML
INJECTION INTRAMUSCULAR; INTRAVENOUS
Status: DISCONTINUED | OUTPATIENT
Start: 2023-06-01 | End: 2023-06-01

## 2023-06-01 RX ORDER — NICOTINE POLACRILEX 4 MG
30 LOZENGE BUCCAL
Status: DISCONTINUED | OUTPATIENT
Start: 2023-06-01 | End: 2023-06-01

## 2023-06-01 NOTE — DISCHARGE INSTRUCTIONS
You have been given medication that makes you sleepy. This may have included both pain medication and sleeping medication. Most of the effects have worn off but you may still have some drowsiness for the next 6 to 8 hours. Home Care  Follow these guidelines when you get home:    --Don't drink any alcohol for the next 24 hours. --Don't drive, operate dangerous machinery, make important business or personal    decisions, or sign legal documents during the next 24 hours. Follow Up Care  Follow up with your healthcare provider if you are not alert and back to your usual level of activity within 12 hours    When to seek medical advice  Call your healthcare provider right away if any of these occur:    --Drowsiness that gets worse  --Weakness or dizziness that gets worse  --Repeated vomiting  --You can not be awakened    Home Care Instructions Following Your Pain Procedure     [place],  It has been a pleasure to have you as our patient. To help you at home, you must follow these general discharge instructions. We will review these with you before you are discharged. It is our hope that you have a complete and uneventful recovery from our procedure. General Instructions:  What to Expect:  Bandages from your procedure today can be removed when you get home. Please avoid soaking and/or swimming for 24 hours. Showering is okay  It is normal to have increased pain symptoms and/or pain at injection site for up to 3-5 days after procedure, you can use heat or ice (20 minutes on 20 minutes off) for comfort. You may experience some temporary side effects which may include restlessness or insomnia, flushing of the face, or heart palpitations. Please contact the provider if these symptoms do not resolve within 3-4 days. Lightheadedness or nausea may occur and should resolve within 24 to 48 hours.   If you develop a headache after treatment, rest, drink fluids (with caffeine, if possible) and take mild over-the-counter pain medication. If the headache does not improve with the above treatment, contact the physician. Home Medications:  Resume all previously prescribed medication. Please avoid taking NSAIDs (Non-Steriodal Anti-Inflammatory Drugs) such as:  Ibuprofen ( Advil, Motrin) Aleve (Naproxen), Diclofenac, Meloxicam for 6 hours after procedure. If you are on Coumadin (Warfarin) or any other anti-coagulant (or \"blood thinning\") medication such as Plavix (Clopidogrel), Xarelto (Rivaroxaban), Eliquis (Apixaban), Effient (Prasugrel) etc., restart on the following day from the procedure unless otherwise directed by your provider. If you are a diabetic, please increase the frequency of your glucose monitoring after the procedure as steroids may cause a temporary (2-3 day) increase in your blood sugar. Contact your primary care physician if your blood sugar remains elevated as you may require some medication adjustment. Diet:  Resume your regular diet as tolerated. Activity: We recommend that you relax and rest during the rest of your procedure day. If you feel weakness in your arms or legs do not drive. Follow-up Appointment  Please schedule a follow-up visit within 3 to 4 weeks after your last procedure date. Question or Concerns:  Feel free to call our office with any questions or concerns at [phone] (option #2)    [place]  Thank you for coming to [facility] for your procedure. The nurses try very hard to make sure you receive the best care possible. Your trust in them as well as us is greatly appreciated.     Thanks so much,   Dr. Aaron Miguel

## 2023-06-01 NOTE — OPERATIVE REPORT
[facility]  Operative Report  2023     Alexsandra White Patient Status:  Hospital Outpatient Surgery    1940 MRN [id number]   Location [address] [address] Attending Bella Ellis MD   Hosp Day # 0 PCP Maribel Dumont MD     Indication: Vikki Leos is a 80year old female with lumbar degenerative disc disease    Imaging: MRI reviewed    Preoperative Diagnosis:  Lumbar spondylosis [M47.816]  Spinal stenosis of lumbar region with neurogenic claudication [M48.062]    Postoperative Diagnosis: Same as above. Procedure performed: Radiofrequency ablation of right lumbar medial branches    Anesthesia: Local and IV Sedation. EBL: Less than 1 ml. Procedure Description:   After reviewing the patient's history and performing a focused physical examination, the diagnosis was confirmed and contraindications such as infection and coagulopathy were ruled out. Following review of potential side effects and complications, including but not necessarily limited to infection, allergic reaction, local tissue breakdown, nerve injury, and paresis, the patient indicated they understood and agreed to proceed. After obtaining the informed consent, the patient was brought to the procedure room and monitored. Per my order and under my supervision, the patient was sedated with intermittent intravenous doses of versed and fentanyl. The vital signs were monitored and recorded by an experienced RN. The procedure started after the patient was adequately sedated. The moderate intravenous conscious sedation was provided for 10 minutes. The patient was placed prone on the table. The patient's back was prepped and draped in sterile fashion. The skin was anesthetized via 25-gauge 1.5\" needle with approximately 2 mL of 1% lidocaine for local anesthesia.   Under fluoroscopic guidance, a 22-gauge, 100-mm SMK needle was advanced to the junction of the superior aspect of the L3 transverse process and the lateral aspect of the same level superior articular process at right L3 level . The needle was then walked off the bony tissue and advanced 2 to 3 mm to lie along the path of the L3 medial branch nerve. AP and lateral radiographs were obtained to document proper needle position. Sensory and motor stimulation were then performed, which elicited deep local back discomfort but no evidence of motor stimulation in the gluteal muscles or extremities. At this point, 0.5 mL of 1% lidocaine was injected to the tissues around the tip of the SMK needle. Subsequently, a medial branch nerve denervation was performed for 90 seconds at 80 degrees centigrade without complication. Similar procedures were performed at right L4-L5 and L5-S1 level. The radiofrequency probe was removed with the needle left in place and 1% lidocaine and 10 mg methylprednisolone was injected through each needle. The needles were removed with tips intact. The patient tolerated the procedure very well. There was no subjective or objective loss of motor strength. The patient was observed until discharge criteria met. Discharge instructions were given and patient was released to a responsible adult. Complications: None. Follow up: The patient will be followed in the pain clinic as needed basis.       Nereyda Newman MD

## 2023-06-02 ENCOUNTER — TELEPHONE (OUTPATIENT)
Dept: PAIN CLINIC | Facility: CLINIC | Age: 83
End: 2023-06-02

## 2023-06-02 NOTE — TELEPHONE ENCOUNTER
Follow-up call post pain procedure. Left message informing patient to contact the pain clinic at [phone] option #2 regarding any questions or concerns about recent pain procedure.     Procedure: RADIOFREQUENCY ABLATION OF THORACIC OR LUMBAR MEDIAL BRANCH right L3/4, L4/5, L5/S1 with sedation  Date: 6/1/23  Follow up Visit Scheduled: 6/29/23

## 2023-06-05 ENCOUNTER — LAB ENCOUNTER (OUTPATIENT)
Dept: LAB | Age: 83
End: 2023-06-05
Attending: FAMILY MEDICINE
Payer: MEDICARE

## 2023-06-05 DIAGNOSIS — E55.9 VITAMIN D DEFICIENCY: ICD-10-CM

## 2023-06-05 DIAGNOSIS — E11.65 TYPE 2 DIABETES MELLITUS WITH HYPERGLYCEMIA, WITHOUT LONG-TERM CURRENT USE OF INSULIN (HCC): ICD-10-CM

## 2023-06-05 LAB
EST. AVERAGE GLUCOSE BLD GHB EST-MCNC: 180 MG/DL (ref 68–126)
HBA1C MFR BLD: 7.9 % (ref ?–5.7)
VIT D+METAB SERPL-MCNC: 35.4 NG/ML (ref 30–100)

## 2023-06-05 PROCEDURE — 36415 COLL VENOUS BLD VENIPUNCTURE: CPT

## 2023-06-05 PROCEDURE — 83036 HEMOGLOBIN GLYCOSYLATED A1C: CPT

## 2023-06-05 PROCEDURE — 82306 VITAMIN D 25 HYDROXY: CPT

## 2023-06-08 ENCOUNTER — OFFICE VISIT (OUTPATIENT)
Dept: SURGERY | Facility: CLINIC | Age: 83
End: 2023-06-08
Payer: MEDICARE

## 2023-06-08 ENCOUNTER — TELEPHONE (OUTPATIENT)
Dept: SURGERY | Facility: CLINIC | Age: 83
End: 2023-06-08

## 2023-06-08 VITALS
SYSTOLIC BLOOD PRESSURE: 130 MMHG | WEIGHT: 176 LBS | DIASTOLIC BLOOD PRESSURE: 70 MMHG | BODY MASS INDEX: 32.39 KG/M2 | HEIGHT: 62 IN | HEART RATE: 65 BPM

## 2023-06-08 DIAGNOSIS — G56.03 BILATERAL CARPAL TUNNEL SYNDROME: Primary | ICD-10-CM

## 2023-06-08 PROCEDURE — 99212 OFFICE O/P EST SF 10 MIN: CPT | Performed by: NEUROLOGICAL SURGERY

## 2023-06-08 NOTE — TELEPHONE ENCOUNTER
Called to reschedule pt to tomorrow  If pt calls back please transfer call back to nursing      William Lazo to keep appt for 1 pm today

## 2023-06-08 NOTE — PROGRESS NOTES
Ears are ok. No blood in stool.    Established patient:  Reason for follow up: Bilateral Carpal tunnel     Numeric Rating Scale: (No Pain) 0  to  10 (Worst Pain)        Pain at Present:  0/10       Distribution of Pain:    bilateral    Most recent treatments for Current Pain Condition:   NSAIDS  Response to treatment: some relief    New imaging or testing since your last office visit:

## 2023-06-12 ENCOUNTER — OFFICE VISIT (OUTPATIENT)
Dept: FAMILY MEDICINE CLINIC | Facility: CLINIC | Age: 83
End: 2023-06-12
Payer: MEDICARE

## 2023-06-12 ENCOUNTER — PATIENT MESSAGE (OUTPATIENT)
Dept: FAMILY MEDICINE CLINIC | Facility: CLINIC | Age: 83
End: 2023-06-12

## 2023-06-12 VITALS
BODY MASS INDEX: 32.39 KG/M2 | DIASTOLIC BLOOD PRESSURE: 78 MMHG | RESPIRATION RATE: 16 BRPM | OXYGEN SATURATION: 98 % | HEIGHT: 62 IN | HEART RATE: 67 BPM | SYSTOLIC BLOOD PRESSURE: 104 MMHG | WEIGHT: 176 LBS

## 2023-06-12 DIAGNOSIS — M48.062 SPINAL STENOSIS OF LUMBAR REGION WITH NEUROGENIC CLAUDICATION: ICD-10-CM

## 2023-06-12 DIAGNOSIS — M12.9 ARTHRITIS INVOLVING MULTIPLE SITES: Primary | ICD-10-CM

## 2023-06-12 PROCEDURE — 99214 OFFICE O/P EST MOD 30 MIN: CPT | Performed by: FAMILY MEDICINE

## 2023-06-12 RX ORDER — LIDOCAINE 50 MG/G
1 PATCH TOPICAL EVERY 24 HOURS
Qty: 30 PATCH | Refills: 3 | Status: SHIPPED | OUTPATIENT
Start: 2023-06-12

## 2023-06-12 NOTE — TELEPHONE ENCOUNTER
From: Minor Conteh  To: Jaimie Gunderson MD  Sent: 6/12/2023 4:16 PM CDT  Subject: Prescription for Lidocaine    Hi Dr. Chauncey Peñaloza, the [address] [place] [address] just called, insurance will not cover Lidocaine patches. They are going to contact you to fill some paperwork out for insurance so they might reconsider.  Just an Jihan [place], Thank You

## 2023-06-12 NOTE — PATIENT INSTRUCTIONS
Chiropractor    [address] [place] [address] [id number]  [place], [address] [place] [address]    FCHAY:[phone]  TNO:[phone]

## 2023-06-29 ENCOUNTER — OFFICE VISIT (OUTPATIENT)
Dept: PAIN CLINIC | Facility: CLINIC | Age: 83
End: 2023-06-29
Payer: MEDICARE

## 2023-06-29 VITALS
BODY MASS INDEX: 32 KG/M2 | DIASTOLIC BLOOD PRESSURE: 78 MMHG | OXYGEN SATURATION: 95 % | HEART RATE: 65 BPM | WEIGHT: 176 LBS | SYSTOLIC BLOOD PRESSURE: 120 MMHG

## 2023-06-29 DIAGNOSIS — M47.816 LUMBAR SPONDYLOSIS: ICD-10-CM

## 2023-06-29 DIAGNOSIS — M48.062 SPINAL STENOSIS OF LUMBAR REGION WITH NEUROGENIC CLAUDICATION: Primary | ICD-10-CM

## 2023-06-29 PROCEDURE — 99214 OFFICE O/P EST MOD 30 MIN: CPT | Performed by: PHYSICIAN ASSISTANT

## 2023-07-05 RX ORDER — MELOXICAM 7.5 MG/1
7.5 TABLET ORAL DAILY
Qty: 30 TABLET | Refills: 0 | Status: SHIPPED | OUTPATIENT
Start: 2023-07-05

## 2023-07-05 NOTE — TELEPHONE ENCOUNTER
Medication:   Meloxicam 7.5 MG Oral Tab 30 tablet 1 4/27/2023    Sig:   Take 1 tablet (7.5 mg total) by mouth daily          Date of last refill: 4.27.23 (#30/1)  [place]      Last office visit: 6.8.23  Due back to clinic per last office note:    Date next office visit scheduled:    Future Appointments   Date Time Provider [place]   9/15/2023 10:10 AM Thelma Vázquez MD [facility]   12/18/2023  1:00 PM Missy Pruitt MD [facility]           Last OV note recommendation:    \"ASSESSMENT and PLAN:  80-year-old female with bilateral carpal tunnel syndrome  She is doing well  She does not want any intervention the moment  I think is very reasonable  We discussed carpal tunnel in general  She will call me if things flareup  All questions answered  Patient appreciative\"

## 2023-08-06 DIAGNOSIS — F41.9 ANXIETY: ICD-10-CM

## 2023-08-07 RX ORDER — SERTRALINE HYDROCHLORIDE 25 MG/1
25 TABLET, FILM COATED ORAL DAILY
Qty: 30 TABLET | Refills: 0 | Status: SHIPPED | OUTPATIENT
Start: 2023-08-07

## 2023-08-07 NOTE — TELEPHONE ENCOUNTER
Medication(s) to Refill:   Requested Prescriptions     Pending Prescriptions Disp Refills    SERTRALINE 25 MG Oral Tab [Pharmacy Med Name: Sertraline Hcl 25 Mg Tab Nort] 30 tablet 0     Sig: Take 1 tablet (25 mg total) by mouth daily.          Reason for Medication Refill being sent to Provider / Reason Protocol Failed:  [] 90 day refill has already been granted  [] Blood Pressure out of range  [] Labs Abnormal/over due  [] Medication not previously prescribed by Provider  [x] Non-Protocol Medication  [] Controlled Substance   [] Due for appointment- no future appointment scheduled  [] No Follow up specified      Last Time Medication was Filled:  7/11/2023      Last Office Visit with PCP: 6/12/2023    When Patient was Due Back to the Office:  3 months   (from when PCP last addressed condition)    Future Appointments:  Future Appointments   Date Time Provider [place]   9/15/2023 10:10 AM Demetrius Catalan MD [facility]   12/18/2023  1:00 PM Addis Rowley MD [facility]         Last Blood Pressures:  BP Readings from Last 2 Encounters:  06/29/23 : 120/78  06/12/23 : 104/78      Action taken:  [] Refill approved per protocol  [x] Routing to provider for approval

## 2023-08-16 DIAGNOSIS — R42 DIZZINESS: ICD-10-CM

## 2023-08-16 NOTE — TELEPHONE ENCOUNTER
Medication: BUSPIRONE 5 MG      Date of last refill: 5/22/2023 (#180/0)  [place]        Last office visit: 2/11/2022  Due back to clinic per last office note:  1 year  Date next office visit scheduled:    Future Appointments   Date Time Provider [place]   9/15/2023 10:10 AM Mario Alberto Rodriguez MD [facility]   12/18/2023  1:00 PM Teetee Schultz MD [facility]           Last OV note recommendation:       Dizziness has resolved. Anxiety better.   MRI brain negative for acute abnormality     Continue Buspar 5 mg BID- quite effective per patient  Continue low dose Zoloft per PCP     Follow up in 12 months for refills

## 2023-08-17 RX ORDER — BUSPIRONE HYDROCHLORIDE 5 MG/1
5 TABLET ORAL 2 TIMES DAILY
Qty: 60 TABLET | Refills: 0 | Status: SHIPPED | OUTPATIENT
Start: 2023-08-17

## 2023-08-28 DIAGNOSIS — R42 DIZZINESS: ICD-10-CM

## 2023-08-29 RX ORDER — BUSPIRONE HYDROCHLORIDE 5 MG/1
5 TABLET ORAL 2 TIMES DAILY
Qty: 60 TABLET | Refills: 0 | Status: SHIPPED | OUTPATIENT
Start: 2023-08-29

## 2023-09-05 DIAGNOSIS — F41.9 ANXIETY: ICD-10-CM

## 2023-09-05 NOTE — TELEPHONE ENCOUNTER
Requesting refill on sertraline. LOV 6/12/2023. LF 8/7/2023. Please approve or deny pending rx. Thank you.

## 2023-09-06 RX ORDER — SERTRALINE HYDROCHLORIDE 25 MG/1
25 TABLET, FILM COATED ORAL DAILY
Qty: 30 TABLET | Refills: 3 | Status: SHIPPED | OUTPATIENT
Start: 2023-09-06

## 2023-09-15 ENCOUNTER — OFFICE VISIT (OUTPATIENT)
Dept: NEUROLOGY | Facility: CLINIC | Age: 83
End: 2023-09-15
Payer: MEDICARE

## 2023-09-15 VITALS
WEIGHT: 178 LBS | SYSTOLIC BLOOD PRESSURE: 118 MMHG | HEART RATE: 64 BPM | RESPIRATION RATE: 14 BRPM | BODY MASS INDEX: 33 KG/M2 | DIASTOLIC BLOOD PRESSURE: 78 MMHG

## 2023-09-15 DIAGNOSIS — R42 DIZZINESS: Primary | ICD-10-CM

## 2023-09-15 DIAGNOSIS — F41.9 ANXIETY: ICD-10-CM

## 2023-09-15 PROCEDURE — 99213 OFFICE O/P EST LOW 20 MIN: CPT | Performed by: OTHER

## 2023-09-15 RX ORDER — BUSPIRONE HYDROCHLORIDE 5 MG/1
5 TABLET ORAL 2 TIMES DAILY
Qty: 60 TABLET | Refills: 11 | Status: SHIPPED | OUTPATIENT
Start: 2023-09-15

## 2023-10-12 ENCOUNTER — PATIENT MESSAGE (OUTPATIENT)
Dept: FAMILY MEDICINE CLINIC | Facility: CLINIC | Age: 83
End: 2023-10-12

## 2023-10-13 NOTE — TELEPHONE ENCOUNTER
From: Emerald Osei  To: Brennen Arias  Sent: 10/12/2023 9:26 PM CDT  Subject: other    Hi Dr. Familia Mac, quick question. Can I take CBD gummies for my back and sciatic pain? Not sure since I am on Buspro and Zolof. What would be your recommendation?

## 2023-10-30 ENCOUNTER — LAB ENCOUNTER (OUTPATIENT)
Dept: LAB | Age: 83
End: 2023-10-30
Attending: INTERNAL MEDICINE
Payer: MEDICARE

## 2023-10-30 DIAGNOSIS — E78.00 PURE HYPERCHOLESTEROLEMIA: Primary | ICD-10-CM

## 2023-10-30 DIAGNOSIS — I10 ESSENTIAL HYPERTENSION, MALIGNANT: ICD-10-CM

## 2023-10-30 LAB
ALBUMIN SERPL-MCNC: 3.6 G/DL (ref 3.4–5)
ALBUMIN/GLOB SERPL: 1.1 {RATIO} (ref 1–2)
ALP LIVER SERPL-CCNC: 89 U/L
ALT SERPL-CCNC: 22 U/L
ANION GAP SERPL CALC-SCNC: 5 MMOL/L (ref 0–18)
AST SERPL-CCNC: 22 U/L (ref 15–37)
BILIRUB SERPL-MCNC: 0.8 MG/DL (ref 0.1–2)
BUN BLD-MCNC: 12 MG/DL (ref 7–18)
CALCIUM BLD-MCNC: 9.4 MG/DL (ref 8.5–10.1)
CHLORIDE SERPL-SCNC: 108 MMOL/L (ref 98–112)
CHOLEST SERPL-MCNC: 165 MG/DL (ref ?–200)
CO2 SERPL-SCNC: 28 MMOL/L (ref 21–32)
CREAT BLD-MCNC: 1.22 MG/DL
EGFRCR SERPLBLD CKD-EPI 2021: 44 ML/MIN/1.73M2 (ref 60–?)
FASTING PATIENT LIPID ANSWER: YES
FASTING STATUS PATIENT QL REPORTED: YES
GLOBULIN PLAS-MCNC: 3.2 G/DL (ref 2.8–4.4)
GLUCOSE BLD-MCNC: 136 MG/DL (ref 70–99)
HDLC SERPL-MCNC: 51 MG/DL (ref 40–59)
LDLC SERPL CALC-MCNC: 92 MG/DL (ref ?–100)
NONHDLC SERPL-MCNC: 114 MG/DL (ref ?–130)
OSMOLALITY SERPL CALC.SUM OF ELEC: 294 MOSM/KG (ref 275–295)
POTASSIUM SERPL-SCNC: 4.5 MMOL/L (ref 3.5–5.1)
PROT SERPL-MCNC: 6.8 G/DL (ref 6.4–8.2)
SODIUM SERPL-SCNC: 141 MMOL/L (ref 136–145)
TRIGL SERPL-MCNC: 125 MG/DL (ref 30–149)
VLDLC SERPL CALC-MCNC: 20 MG/DL (ref 0–30)

## 2023-10-30 PROCEDURE — 36415 COLL VENOUS BLD VENIPUNCTURE: CPT

## 2023-10-30 PROCEDURE — 80053 COMPREHEN METABOLIC PANEL: CPT

## 2023-10-30 PROCEDURE — 80061 LIPID PANEL: CPT

## 2023-11-22 RX ORDER — LEVOTHYROXINE SODIUM 0.05 MG/1
50 TABLET ORAL DAILY
Qty: 90 TABLET | Refills: 0 | Status: SHIPPED | OUTPATIENT
Start: 2023-11-22

## 2023-12-19 ENCOUNTER — OFFICE VISIT (OUTPATIENT)
Dept: FAMILY MEDICINE CLINIC | Facility: CLINIC | Age: 83
End: 2023-12-19
Payer: MEDICARE

## 2023-12-19 ENCOUNTER — LAB ENCOUNTER (OUTPATIENT)
Dept: LAB | Age: 83
End: 2023-12-19
Attending: FAMILY MEDICINE
Payer: MEDICARE

## 2023-12-19 VITALS
RESPIRATION RATE: 16 BRPM | WEIGHT: 179 LBS | BODY MASS INDEX: 32.94 KG/M2 | OXYGEN SATURATION: 100 % | HEART RATE: 55 BPM | HEIGHT: 62 IN | SYSTOLIC BLOOD PRESSURE: 118 MMHG | DIASTOLIC BLOOD PRESSURE: 76 MMHG

## 2023-12-19 DIAGNOSIS — K13.0 DISEASE OF LIPS: ICD-10-CM

## 2023-12-19 DIAGNOSIS — M48.062 SPINAL STENOSIS OF LUMBAR REGION WITH NEUROGENIC CLAUDICATION: ICD-10-CM

## 2023-12-19 DIAGNOSIS — E11.65 TYPE 2 DIABETES MELLITUS WITH HYPERGLYCEMIA, WITHOUT LONG-TERM CURRENT USE OF INSULIN (HCC): Primary | ICD-10-CM

## 2023-12-19 DIAGNOSIS — E11.65 TYPE 2 DIABETES MELLITUS WITH HYPERGLYCEMIA, WITHOUT LONG-TERM CURRENT USE OF INSULIN (HCC): ICD-10-CM

## 2023-12-19 DIAGNOSIS — F41.9 ANXIETY: ICD-10-CM

## 2023-12-19 DIAGNOSIS — E55.9 VITAMIN D DEFICIENCY: ICD-10-CM

## 2023-12-19 LAB
ALBUMIN SERPL-MCNC: 4.1 G/DL (ref 3.4–5)
ALBUMIN/GLOB SERPL: 1.3 {RATIO} (ref 1–2)
ALP LIVER SERPL-CCNC: 94 U/L
ALT SERPL-CCNC: 21 U/L
ANION GAP SERPL CALC-SCNC: 5 MMOL/L (ref 0–18)
AST SERPL-CCNC: 18 U/L (ref 15–37)
BASOPHILS # BLD AUTO: 0.04 X10(3) UL (ref 0–0.2)
BASOPHILS NFR BLD AUTO: 0.9 %
BILIRUB SERPL-MCNC: 0.8 MG/DL (ref 0.1–2)
BUN BLD-MCNC: 13 MG/DL (ref 9–23)
CALCIUM BLD-MCNC: 9.3 MG/DL (ref 8.5–10.1)
CHLORIDE SERPL-SCNC: 108 MMOL/L (ref 98–112)
CHOLEST SERPL-MCNC: 168 MG/DL (ref ?–200)
CO2 SERPL-SCNC: 28 MMOL/L (ref 21–32)
CREAT BLD-MCNC: 0.91 MG/DL
CREAT UR-SCNC: 126 MG/DL
EGFRCR SERPLBLD CKD-EPI 2021: 63 ML/MIN/1.73M2 (ref 60–?)
EOSINOPHIL # BLD AUTO: 0.2 X10(3) UL (ref 0–0.7)
EOSINOPHIL NFR BLD AUTO: 4.3 %
ERYTHROCYTE [DISTWIDTH] IN BLOOD BY AUTOMATED COUNT: 12.7 %
FASTING PATIENT LIPID ANSWER: YES
FASTING STATUS PATIENT QL REPORTED: YES
GLOBULIN PLAS-MCNC: 3.2 G/DL (ref 2.8–4.4)
GLUCOSE BLD-MCNC: 130 MG/DL (ref 70–99)
HCT VFR BLD AUTO: 39.5 %
HDLC SERPL-MCNC: 62 MG/DL (ref 40–59)
HGB BLD-MCNC: 12.9 G/DL
IMM GRANULOCYTES # BLD AUTO: 0.01 X10(3) UL (ref 0–1)
IMM GRANULOCYTES NFR BLD: 0.2 %
LDLC SERPL CALC-MCNC: 89 MG/DL (ref ?–100)
LYMPHOCYTES # BLD AUTO: 1.48 X10(3) UL (ref 1–4)
LYMPHOCYTES NFR BLD AUTO: 31.5 %
MCH RBC QN AUTO: 29.8 PG (ref 26–34)
MCHC RBC AUTO-ENTMCNC: 32.7 G/DL (ref 31–37)
MCV RBC AUTO: 91.2 FL
MICROALBUMIN UR-MCNC: 1.32 MG/DL
MICROALBUMIN/CREAT 24H UR-RTO: 10.5 UG/MG (ref ?–30)
MONOCYTES # BLD AUTO: 0.55 X10(3) UL (ref 0.1–1)
MONOCYTES NFR BLD AUTO: 11.7 %
NEUTROPHILS # BLD AUTO: 2.42 X10 (3) UL (ref 1.5–7.7)
NEUTROPHILS # BLD AUTO: 2.42 X10(3) UL (ref 1.5–7.7)
NEUTROPHILS NFR BLD AUTO: 51.4 %
NONHDLC SERPL-MCNC: 106 MG/DL (ref ?–130)
OSMOLALITY SERPL CALC.SUM OF ELEC: 294 MOSM/KG (ref 275–295)
PLATELET # BLD AUTO: 209 10(3)UL (ref 150–450)
POTASSIUM SERPL-SCNC: 4 MMOL/L (ref 3.5–5.1)
PROT SERPL-MCNC: 7.3 G/DL (ref 6.4–8.2)
RBC # BLD AUTO: 4.33 X10(6)UL
SODIUM SERPL-SCNC: 141 MMOL/L (ref 136–145)
TRIGL SERPL-MCNC: 91 MG/DL (ref 30–149)
VIT B12 SERPL-MCNC: 394 PG/ML (ref 193–986)
VLDLC SERPL CALC-MCNC: 15 MG/DL (ref 0–30)
WBC # BLD AUTO: 4.7 X10(3) UL (ref 4–11)

## 2023-12-19 PROCEDURE — 84207 ASSAY OF VITAMIN B-6: CPT

## 2023-12-19 PROCEDURE — 82607 VITAMIN B-12: CPT

## 2023-12-19 PROCEDURE — 85025 COMPLETE CBC W/AUTO DIFF WBC: CPT

## 2023-12-19 PROCEDURE — 36415 COLL VENOUS BLD VENIPUNCTURE: CPT

## 2023-12-19 PROCEDURE — 83036 HEMOGLOBIN GLYCOSYLATED A1C: CPT

## 2023-12-19 PROCEDURE — 99215 OFFICE O/P EST HI 40 MIN: CPT | Performed by: FAMILY MEDICINE

## 2023-12-19 PROCEDURE — 80053 COMPREHEN METABOLIC PANEL: CPT

## 2023-12-19 PROCEDURE — 82043 UR ALBUMIN QUANTITATIVE: CPT

## 2023-12-19 PROCEDURE — 82570 ASSAY OF URINE CREATININE: CPT

## 2023-12-19 PROCEDURE — 80061 LIPID PANEL: CPT

## 2023-12-19 RX ORDER — SERTRALINE HYDROCHLORIDE 25 MG/1
25 TABLET, FILM COATED ORAL DAILY
Qty: 90 TABLET | Refills: 3 | Status: SHIPPED | OUTPATIENT
Start: 2023-12-19

## 2023-12-20 LAB
EST. AVERAGE GLUCOSE BLD GHB EST-MCNC: 177 MG/DL (ref 68–126)
HBA1C MFR BLD: 7.8 % (ref ?–5.7)

## 2023-12-24 LAB — VITAMIN B6: 5 UG/L

## 2024-03-15 ENCOUNTER — TELEPHONE (OUTPATIENT)
Dept: FAMILY MEDICINE CLINIC | Facility: CLINIC | Age: 84
End: 2024-03-15

## 2024-03-15 RX ORDER — SERTRALINE HYDROCHLORIDE 25 MG/1
12.5 TABLET, FILM COATED ORAL DAILY
COMMUNITY

## 2024-03-15 RX ORDER — MUPIROCIN CALCIUM 20 MG/G
CREAM TOPICAL 2 TIMES DAILY PRN
COMMUNITY

## 2024-03-15 RX ORDER — LIDOCAINE 50 MG/G
1 PATCH TOPICAL
COMMUNITY

## 2024-03-15 NOTE — TELEPHONE ENCOUNTER
COMPREHENSIVE MEDICATION REVIEW         [place] Tabor MRN [id number]    1940 PCP Peg Carrillo MD     Comments: Medication history completed by Ambulatory Clinic Pharmacist over the phone on 3/15/24. Patient has upcoming AWV with PCP on 3/19/24.     After thorough medication review, 7 discrepancies have been identified and corrected on patient's medication list. See updated list below:     Outpatient Encounter Medications as of 3/15/2024   Medication Sig    lidocaine 5 % External Patch Place 1 patch onto the skin daily as needed.    mupirocin 2 % External Cream Apply topically 2 (two) times daily as needed.    sertraline 25 MG Oral Tab Take 0.5 tablets (12.5 mg total) by mouth daily.    levothyroxine 50 MCG Oral Tab Take 1 tablet (50 mcg total) by mouth daily.    busPIRone 5 MG Oral Tab Take 1 tablet (5 mg total) by mouth 2 (two) times daily.    spironolactone 25 MG Oral Tab Take 0.5 tablets (12.5 mg total) by mouth daily.    losartan Potassium 50 MG Oral Tab Take 0.5 tablets (25 mg total) by mouth daily.    carvedilol 6.25 MG Oral Tab Take 1 tablet (6.25 mg total) by mouth 2 (two) times daily.    aspirin EC 81 MG Oral Tab EC Take 1 tablet (81 mg total) by mouth every evening.    Vitamin D3 25 MCG (1000 UT) Oral Tab Take 1 tablet (1,000 Units total) by mouth daily.    simvastatin 20 MG Oral Tab Take 1 tablet (20 mg total) by mouth nightly. Take with 40 mg tablet to total 60 mg nightly.    simvastatin 40 MG Oral Tab Take 1 tablet (40 mg total) by mouth nightly. Take with 20 mg tablet to total 60 mg nightly.    Calcium Carbonate-Vitamin D 600-125 MG-UNIT Oral Tab Take 1 tablet by mouth daily.      Coenzyme Q10 (COQ10) 100 MG Oral Cap Take 1 tablet by mouth daily.      Glucosamine-Fish Oil-EPA-DHA (GLUCOSAMINE & FISH OIL OR) Take 1 capsule by mouth daily.      vitamin E 400 UNITS Oral Cap Take 1 capsule (400 Units total) by mouth daily.     Medication Assessment:   Reviewed all medications in detail with  patient including dose, indication, timing of administration, monitoring parameters, and potential side effects of medications.     Patient reports taking carvedilol 6.25 mg twice daily, losartan 25 mg (half tablet) daily, simvastatin 60 mg nightly (40 mg + 20 mg) and spironolactone 12.5 mg (half tablet) daily as prescribed. Did recommend patient monitor her blood pressure 2-3 times weekly and bring readings into all MD appointments for review.     Did provide education and stressed the importance of taking medication just like prescribed to get the most benefit. Patient denies forgetting or missing medication doses. Patient denies any questions or concerns with medications at this time.     Thank you,    Kristen Quiroz, PharmD, 3/15/2024, 10:55 AM

## 2024-03-19 ENCOUNTER — TELEPHONE (OUTPATIENT)
Dept: FAMILY MEDICINE CLINIC | Facility: CLINIC | Age: 84
End: 2024-03-19

## 2024-03-19 ENCOUNTER — LAB ENCOUNTER (OUTPATIENT)
Dept: LAB | Age: 84
End: 2024-03-19
Attending: FAMILY MEDICINE
Payer: MEDICARE

## 2024-03-19 ENCOUNTER — OFFICE VISIT (OUTPATIENT)
Dept: FAMILY MEDICINE CLINIC | Facility: CLINIC | Age: 84
End: 2024-03-19
Payer: MEDICARE

## 2024-03-19 VITALS
RESPIRATION RATE: 18 BRPM | HEIGHT: 62 IN | SYSTOLIC BLOOD PRESSURE: 122 MMHG | DIASTOLIC BLOOD PRESSURE: 80 MMHG | OXYGEN SATURATION: 99 % | BODY MASS INDEX: 32.02 KG/M2 | WEIGHT: 174 LBS | HEART RATE: 63 BPM

## 2024-03-19 DIAGNOSIS — M17.11 PRIMARY OSTEOARTHRITIS OF RIGHT KNEE: ICD-10-CM

## 2024-03-19 DIAGNOSIS — G89.29 CHRONIC BILATERAL LOW BACK PAIN WITHOUT SCIATICA: ICD-10-CM

## 2024-03-19 DIAGNOSIS — M48.062 SPINAL STENOSIS OF LUMBAR REGION WITH NEUROGENIC CLAUDICATION: ICD-10-CM

## 2024-03-19 DIAGNOSIS — M54.50 CHRONIC BILATERAL LOW BACK PAIN WITHOUT SCIATICA: ICD-10-CM

## 2024-03-19 DIAGNOSIS — I10 BENIGN HYPERTENSION: ICD-10-CM

## 2024-03-19 DIAGNOSIS — E66.9 OBESITY (BMI 30.0-34.9): ICD-10-CM

## 2024-03-19 DIAGNOSIS — M48.062 SPINAL STENOSIS OF LUMBAR REGION WITH NEUROGENIC CLAUDICATION: Primary | ICD-10-CM

## 2024-03-19 DIAGNOSIS — R60.0 LOCALIZED EDEMA: ICD-10-CM

## 2024-03-19 DIAGNOSIS — I35.1 NONRHEUMATIC AORTIC (VALVE) INSUFFICIENCY: ICD-10-CM

## 2024-03-19 DIAGNOSIS — R20.0 NUMBNESS OF HAND: ICD-10-CM

## 2024-03-19 DIAGNOSIS — I65.23 ASYMPTOMATIC CAROTID ARTERY STENOSIS, BILATERAL: ICD-10-CM

## 2024-03-19 DIAGNOSIS — E78.00 PURE HYPERCHOLESTEROLEMIA: ICD-10-CM

## 2024-03-19 DIAGNOSIS — Z85.3 HISTORY OF BREAST CANCER: ICD-10-CM

## 2024-03-19 DIAGNOSIS — E55.9 VITAMIN D DEFICIENCY: ICD-10-CM

## 2024-03-19 DIAGNOSIS — E11.65 TYPE 2 DIABETES MELLITUS WITH HYPERGLYCEMIA, WITHOUT LONG-TERM CURRENT USE OF INSULIN (HCC): ICD-10-CM

## 2024-03-19 DIAGNOSIS — Z00.00 MEDICARE ANNUAL WELLNESS VISIT, SUBSEQUENT: Primary | ICD-10-CM

## 2024-03-19 DIAGNOSIS — E03.9 ACQUIRED HYPOTHYROIDISM: ICD-10-CM

## 2024-03-19 DIAGNOSIS — Z90.89 HISTORY OF PARATHYROIDECTOMY: ICD-10-CM

## 2024-03-19 DIAGNOSIS — Z86.39 HISTORY OF HYPERPARATHYROIDISM: ICD-10-CM

## 2024-03-19 DIAGNOSIS — H02.9 LESION OF RIGHT UPPER EYELID: ICD-10-CM

## 2024-03-19 DIAGNOSIS — Z98.890 HISTORY OF PARATHYROIDECTOMY: ICD-10-CM

## 2024-03-19 PROBLEM — I51.81 TAKOTSUBO CARDIOMYOPATHY: Status: RESOLVED | Noted: 2021-02-26 | Resolved: 2024-03-19

## 2024-03-19 LAB
CREAT UR-SCNC: 133 MG/DL
EST. AVERAGE GLUCOSE BLD GHB EST-MCNC: 174 MG/DL (ref 68–126)
HBA1C MFR BLD: 7.7 % (ref ?–5.7)
MICROALBUMIN UR-MCNC: 1.32 MG/DL
MICROALBUMIN/CREAT 24H UR-RTO: 9.9 UG/MG (ref ?–30)
VIT D+METAB SERPL-MCNC: 35.1 NG/ML (ref 30–100)

## 2024-03-19 PROCEDURE — 82570 ASSAY OF URINE CREATININE: CPT

## 2024-03-19 PROCEDURE — 83036 HEMOGLOBIN GLYCOSYLATED A1C: CPT

## 2024-03-19 PROCEDURE — 36415 COLL VENOUS BLD VENIPUNCTURE: CPT

## 2024-03-19 PROCEDURE — 80061 LIPID PANEL: CPT

## 2024-03-19 PROCEDURE — 80053 COMPREHEN METABOLIC PANEL: CPT

## 2024-03-19 PROCEDURE — 82306 VITAMIN D 25 HYDROXY: CPT

## 2024-03-19 PROCEDURE — 82043 UR ALBUMIN QUANTITATIVE: CPT

## 2024-03-19 NOTE — PATIENT INSTRUCTIONS
Holland Noguera M.D.    Hand and Upper Extremity Surgery  Physician       [address] [facility], [address] [place] [address]   [phone]    [address] [address] [place] [address]     [phone]

## 2024-03-19 NOTE — PROGRESS NOTES
Georgia Tabor is a 84 year old female who presents for a Medicare Annual Wellness visit and bilateral hands numbness.    HPI:    Patient Care Team: Patient Care Team:  Peg Carrillo MD as PCP - General (Family Practice)  All Armenta MD (Obstetrics)  Christopher Ryan MD (GASTROENTEROLOGY)  Jose Hayes MD as Consulting Physician (NEUROLOGY)  Jonah Graham MD (CARDIOLOGY)    Pt has bilateral numbness, hands pain, worse at night, moderate to severe, h/o carpal tunnel syndrome. Bothersome, worsening, last months.  Bilateral edema, left more than right, pt has no symptoms.  R upper eyelid lesion, pt has eye doctor appt, denies any symptoms.    Patient Active Problem List   Diagnosis    Hypothyroid    Medicare annual wellness visit, subsequent    Pure hypercholesterolemia    Primary osteoarthritis of right knee    Benign hypertension    History of breast cancer    Vitamin D deficiency    History of parathyroidectomy    History of hyperparathyroidism    Obesity (BMI 30.0-34.9)    Type 2 diabetes mellitus with hyperglycemia, without long-term current use of insulin ([facility])    Spinal stenosis of lumbar region with neurogenic claudication    Numbness of hand    Asymptomatic carotid artery stenosis, bilateral    Localized edema    Nonrheumatic aortic (valve) insufficiency    Lesion of right upper eyelid    Chronic bilateral low back pain without sciatica       Current Outpatient Medications   Medication Sig Dispense Refill    lidocaine 5 % External Patch Place 1 patch onto the skin daily as needed.      mupirocin 2 % External Cream Apply topically 2 (two) times daily as needed.      sertraline 25 MG Oral Tab Take 0.5 tablets (12.5 mg total) by mouth daily.      levothyroxine 50 MCG Oral Tab Take 1 tablet (50 mcg total) by mouth daily. 90 tablet 0    busPIRone 5 MG Oral Tab Take 1 tablet (5 mg total) by mouth 2 (two) times daily. 60 tablet 11    spironolactone 25 MG Oral Tab Take 0.5 tablets (12.5 mg total) by  mouth daily.      losartan Potassium 50 MG Oral Tab Take 0.5 tablets (25 mg total) by mouth daily.  0    carvedilol 6.25 MG Oral Tab Take 1 tablet (6.25 mg total) by mouth 2 (two) times daily. 60 tablet 3    aspirin EC 81 MG Oral Tab EC Take 1 tablet (81 mg total) by mouth every evening.      Vitamin D3 25 MCG (1000 UT) Oral Tab Take 1 tablet (1,000 Units total) by mouth daily.      simvastatin 20 MG Oral Tab Take 1 tablet (20 mg total) by mouth nightly. Take with 40 mg tablet to total 60 mg nightly.      simvastatin 40 MG Oral Tab Take 1 tablet (40 mg total) by mouth nightly. Take with 20 mg tablet to total 60 mg nightly.      Calcium Carbonate-Vitamin D 600-125 MG-UNIT Oral Tab Take 1 tablet by mouth daily.        Coenzyme Q10 (COQ10) 100 MG Oral Cap Take 1 tablet by mouth daily.        Glucosamine-Fish Oil-EPA-DHA (GLUCOSAMINE & FISH OIL OR) Take 1 capsule by mouth daily.        vitamin E 400 UNITS Oral Cap Take 1 capsule (400 Units total) by mouth daily.       Wt Readings from Last 6 Encounters:   03/19/24 174 lb (78.9 kg)   12/19/23 179 lb (81.2 kg)   09/15/23 178 lb (80.7 kg)   06/29/23 176 lb (79.8 kg)   06/12/23 176 lb (79.8 kg)   06/08/23 176 lb (79.8 kg)     Body mass index is 31.83 kg/m².    No results found for: \"GLUCOSE\"  Lab Results   Component Value Date    CHOLEST 168 12/19/2023    CHOLEST 165 10/30/2023    CHOLEST 182 05/01/2023     Lab Results   Component Value Date    HDL 62 (H) 12/19/2023    HDL 51 10/30/2023    HDL 58 05/01/2023     No results found for: \"TRIGLY\"  Lab Results   Component Value Date    LDL 89 12/19/2023    LDL 92 10/30/2023     (H) 05/01/2023     Lab Results   Component Value Date    AST 18 12/19/2023    AST 22 10/30/2023    AST 19 05/01/2023     Lab Results   Component Value Date    ALT 21 12/19/2023    ALT 22 10/30/2023    ALT 19 05/01/2023     Lab Results   Component Value Date    TSH 2.090 03/13/2023    TSH 1.030 03/04/2022    TSH 0.821 05/18/2021     Lab Results    Component Value Date    BUN 13 12/19/2023    BUN 12 10/30/2023    BUN 12 05/01/2023    CREATSERUM 0.91 12/19/2023    CREATSERUM 1.22 (H) 10/30/2023    CREATSERUM 0.75 05/01/2023       General Health     In the past six months, have you lost more than 10 pounds without trying?: 2 - No    Has your appetite been poor?: No    Type of Diet: Balanced    How does the patient maintain a good energy level?: Appropriate Exercise    How would you describe your daily physical activity?: Moderate    How would you describe your current health state?: Good    How do you maintain positive mental well-being?: Social Interaction;Visiting Friends;Visiting Family         Have you had any immunizations at another office such as Influenza, Hepatitis B, Tetanus, or Pneumococcal?: No         Fall/Risk Assessment        No fall.        Depression Screening (PHQ-2/PHQ-9): Over the LAST 2 WEEKS      No depression                Advance Directives     Do you have a healthcare power of ?: Yes    Do you have a living will?: Yes     Please go to \"Cognitive Assessment\" under Medicare Assessment section in Charting, test patient and document.    Then, refresh your progress note to see your input here.  Cognitive Assessment     What day of the week is this?: Correct    What month is it?: Correct    What year is it?: Correct    Recall \"Ball\": Correct    Recall \"Flag\": Correct    Recall \"Tree\": Correct              PREVENTATIVE SERVICES  INDICATIONS AND SCHEDULE Internal Lab or Procedure External Lab or Procedure   Diabetes Screening      HbgA1C   Annually HgbA1C (%)   Date Value   12/19/2023 7.8 (H)         No data to display                Fasting Blood Sugar (FSB)Annually Glucose (mg/dL)   Date Value   12/19/2023 130 (H)     GLUCOSE (mg/dL)   Date Value   04/14/2014 92       Cardiovascular Disease Screening     LDL Annually LDL Cholesterol (mg/dL)   Date Value   12/19/2023 89     LDL CHOLESTROL (mg/dL)   Date Value   01/16/2014 94         EKG - w/ Initial Preventative Physical Exam only, or if medically necessary Up to date    Colorectal Cancer Screening      Colonoscopy Screen every 10 years Health Maintenance   Topic Date Due    Colorectal Cancer Screening  Discontinued    Update Health Maintenance if applicable    Flex Sigmoidoscopy Screen every 5 years No results found for this or any previous visit.      No data to display                 Fecal Occult Blood Annually No results found for: \"FOB\", \"OCCULTSTOOL\"      No data to display                Glaucoma Screening      Ophthalmology Visit Annually yes    Bone Density Screening      Dexascan Every two years       No data to display                Results for orders placed or performed during the hospital encounter of 10/18/21   XR DEXA BONE DENSITOMETRY (CPT=77080)    Narrative    PROCEDURE:  XR DEXA BONE DENSITOMETRY (CPT=77080)     COMPARISON:  None.     INDICATIONS:    E55.9 Vitamin D deficiency Z78.0 Asymptomatic menopausal state     PATIENT STATED HISTORY: (As transcribed by Technologist)   Asymptomatic menopausal state diagnosis. Dexa scan for bone density evaluation.           LUMBAR SPINE ANALYSIS RESULTS:      Bone mineral density (BMD) (g/cm2):  1.150    Lumbar T-Score:  0.9      % young normals:  110      % age matched controls:  154      Change from prior spine examination:  1.5%               TOTAL HIP ANALYSIS RESULTS:        Bone mineral density (BMD) (g/cm2):  0.942      Total Hip T-Score:  0.0      % young normals:  100      % age matched controls:  139      Change from prior hip examination:  -0.6%               FEMORAL NECK ANALYSIS RESULTS:        Bone mineral density (BMD) (g/cm2):  0.936      Femoral neck T-Score:  -0.1      % young normals:  98      % age matched controls:  143      Change from prior hip examination:  5.6%                               Impression    CONCLUSION:  Bone mineral density values are within normal range when compared to normal patient  population.           The World Health Organization has defined the following categories based on bone density:  Normal bone:  T-score better than -1  Osteopenia: T-score between -1 and -2.5  Osteoporosis:  T-score less than -2.5           Dictated by (CST): Vanessa Jose MD on 10/18/2021 at 5:19 PM       Finalized by (CST): Vanessa Jose MD on 10/18/2021 at 5:19 PM         Pap and Pelvic      Pap: Every 3 yrs age 21-65 or Pap+HPV every 5 yrs age 30-65, age 65 and older at high risk   No recommendations at this time Update Health Maintenance if applicable    Chlamydia  Annually if high risk No results found for: \"CHLAMYDIA\"      No data to display                Screening Mammogram      Mammogram  Annually No recommendations at this time Update Health Maintenance if applicable   Immunizations      Influenza No orders found for this or any previous visit. Update Immunization Activity if applicable    Pneumococcal Orders placed or performed in visit on 04/29/15    Pneumococcal (Prevnar 13) (19085) (DX V03.82)    Update Immunization Activity if applicable    Hepatitis B No orders found for this or any previous visit. Update Immunization Activity if applicable    Tetanus Orders placed or performed in visit on 04/06/16    Td (Tenivac) (99280) (DX V06.5/Z23)    Update Immunization Activity if applicable    Zoster (Not covered by Medicare Part B) No orders found for this or any previous visit. Update Immunization Activity if applicable         SPECIFIC DISEASE MONITORING Internal Lab or Procedure External Lab or Procedure   Annual Monitoring of Persistent     Medications (ACE/ARB, digoxin, diuretics)    Potassium  Annually Potassium (mmol/L)   Date Value   12/19/2023 4.0     POTASSIUM (mmol/L)   Date Value   04/14/2014 3.8   04/18/2012 3.9         No data to display                Creatinine  Annually CREATININE (mg/dL)   Date Value   04/14/2014 0.57     Creatinine (mg/dL)   Date Value   12/19/2023 0.91         No data  to display                Digoxin Serum Conc  Annually No results found for: \"DIGOXIN\"      No data to display                Diabetes      HgbA1C  Annually HgbA1C (%)   Date Value   12/19/2023 7.8 (H)         No data to display                Creat/alb ratio  Annually      LDL  Annually LDL Cholesterol (mg/dL)   Date Value   12/19/2023 89     LDL CHOLESTROL (mg/dL)   Date Value   01/16/2014 94         No data to display                 Dilated Eye exam  Annually      No data to display                   No data to display                COPD      Spirometry Testing Annually No results found for this or any previous visit.      No data to display                    ALLERGIES:     Allergies   Allergen Reactions    Prochlorperazine CONFUSION     confusion  TABS; acting goofy      Adhesive Tape OTHER (SEE COMMENTS)     Skin ripped    Compazine     Compazine      TABS; acting goofy         CURRENT MEDICATIONS:   Current Outpatient Medications   Medication Sig Dispense Refill    lidocaine 5 % External Patch Place 1 patch onto the skin daily as needed.      mupirocin 2 % External Cream Apply topically 2 (two) times daily as needed.      sertraline 25 MG Oral Tab Take 0.5 tablets (12.5 mg total) by mouth daily.      levothyroxine 50 MCG Oral Tab Take 1 tablet (50 mcg total) by mouth daily. 90 tablet 0    busPIRone 5 MG Oral Tab Take 1 tablet (5 mg total) by mouth 2 (two) times daily. 60 tablet 11    spironolactone 25 MG Oral Tab Take 0.5 tablets (12.5 mg total) by mouth daily.      losartan Potassium 50 MG Oral Tab Take 0.5 tablets (25 mg total) by mouth daily.  0    carvedilol 6.25 MG Oral Tab Take 1 tablet (6.25 mg total) by mouth 2 (two) times daily. 60 tablet 3    aspirin EC 81 MG Oral Tab EC Take 1 tablet (81 mg total) by mouth every evening.      Vitamin D3 25 MCG (1000 UT) Oral Tab Take 1 tablet (1,000 Units total) by mouth daily.      simvastatin 20 MG Oral Tab Take 1 tablet (20 mg total) by mouth nightly. Take  with 40 mg tablet to total 60 mg nightly.      simvastatin 40 MG Oral Tab Take 1 tablet (40 mg total) by mouth nightly. Take with 20 mg tablet to total 60 mg nightly.      Calcium Carbonate-Vitamin D 600-125 MG-UNIT Oral Tab Take 1 tablet by mouth daily.        Coenzyme Q10 (COQ10) 100 MG Oral Cap Take 1 tablet by mouth daily.        Glucosamine-Fish Oil-EPA-DHA (GLUCOSAMINE & FISH OIL OR) Take 1 capsule by mouth daily.        vitamin E 400 UNITS Oral Cap Take 1 capsule (400 Units total) by mouth daily.        MEDICAL INFORMATION:   Past Medical History:   Diagnosis Date    Acute bronchitis     Acute maxillary sinusitis     Anxiety state, unspecified     Arthritis     Breast CA (HCC) 2007    Calculus of kidney     Cancer (HCC)     Cancer, colon (HCC)     Exposure to unspecified radiation     2008/breast    Fatigue     HIGH BLOOD PRESSURE     HIGH CHOLESTEROL     Hyperparathyroidism (HCC)     Hypothyroid     Malignant neoplasm of breast (female), unspecified site     Myocardial infarction (HCC)     Other and unspecified hyperlipidemia     Pain in joint, lower leg     Unspecified disorder of thyroid     Unspecified essential hypertension     Valvular disease       Past Surgical History:   Procedure Laterality Date    BREAST SURGERY PROCEDURE UNLISTED      lumpectomy, right    CATARACT      CATARACT SURGERY, COMPLEX Bilateral 2021    CHOLECYSTECTOMY  1974    COLON SURGERY      removal od cancerous lesion    COLONOSCOPY      HYSTERECTOMY      not bso    LUMPECTOMY RIGHT      Invasive and DCIS    MYRA BIOPSY STEREO NODULE 1 SITE RIGHT (CPT=19081)      DCIS    NM PARATHYROID SURGERY  (CPT=78070)            OTHER  2006    lithotripsy    OTHER      parathyroid resection    OTHER SURGICAL HISTORY N/A 2015    Procedure: ANTERIOR POSTERIOR REPAIR;  Surgeon: All Armenta MD;  Location: [facility] MAIN OR    RADIATION RIGHT        Family History   Problem Relation Age of Onset    Heart  Disorder Father     Other (acute MI) Father     Other (amyotrophic lateral sclerosis) Mother     Other (ALS) Mother     Other (CABG) Sister     Diabetes Sister     Cancer Sister     Obesity Sister     Heart Disease Sister     Breast Cancer Self 68    Heart Disorder Brother       SOCIAL HISTORY:   Social History     Socioeconomic History    Marital status:    Tobacco Use    Smoking status: Never    Smokeless tobacco: Never   Vaping Use    Vaping Use: Never used   Substance and Sexual Activity    Alcohol use: Yes     Comment: holidays    Drug use: No   Other Topics Concern    Caffeine Concern No    Stress Concern No    Weight Concern No    Special Diet No    Exercise Yes    Seat Belt Yes   Social History Narrative    ** Merged History Encounter **          Occ: retired  : yes        REVIEW OF SYSTEMS:   GENERAL: feels well otherwise  SKIN: denies any unusual skin lesions  EYES: denies blurred vision or double vision  HEENT: denies nasal congestion, sinus pain or ST  LUNGS: denies shortness of breath with exertion  CARDIOVASCULAR: denies chest pain on exertion  GI: denies abdominal pain, denies heartburn  : denies dysuria, vaginal discharge or itching, no complaint of urinary incontin   MUSCULOSKELETAL: denies back pain  NEURO: denies headaches  PSYCHE: denies depression or anxiety  HEMATOLOGIC: denies hx of anemia  ENDOCRINE: denies thyroid history  ALL/ASTHMA: denies hx of allergy or asthma    EXAM:   /80   Pulse 63   Resp 18   Ht 5' 2\" (1.575 m)   Wt 174 lb (78.9 kg)   LMP 04/25/1985   SpO2 99%   BMI 31.83 kg/m²      >   BP Readings from Last 3 Encounters:   03/19/24 122/80   12/19/23 118/76   09/15/23 118/78        GENERAL: well developed, well nourished, in no apparent distress  SKIN: no rashes, no suspicious lesions  HEENT: atraumatic, normocephalic, ears and throat are clear                Hearing Assessed via: Whispered Voice  EYES: PERRLA, EOMI, conjunctiva are clear, R upper  eyelid:small lump.  NECK: supple, no adenopathy, no bruits  CHEST: no chest tenderness  BREAST: no masses, no discharge or no axillary lymphadenopathy   LUNGS: clear to auscultation  CARDIO: RRR without murmur  GI: good BS's, no masses, HSM or tenderness  : deferred  RECTAL: deferred  MUSCULOSKELETAL: limited ROM of the lower back.  EXTREMITIES: no cyanosis, clubbing, bilateral edema non pitting, left leg more than right.  NEURO: Oriented times three, cranial nerves are intact, motor and sensory are grossly intact          ASSESSMENT AND OTHER of PLAN RELEVANT CHRONIC CONDITIONS:   Georgia Tabor is a 84 year old female who presents for a Medicare Assessment.     Numbness of hand: referral for injections to hands ortho.    Type 2 diabetes mellitus with hyperglycemia, without long-term current use of insulin (HCC):   low sugar, low salt and  lipid diet, exercise 3 times a week d/w the pt.  Doing well.      Acquired hypothyroidism  - TSH; Future  Doing well.    Primary osteoarthritis of right knee: falling risk d/w the pt.  - Referral to Dr. Donovan.    Vitamin D deficiency  - VITAMIN D, 25-HYDROXY; Future  Vit. D 2000U a day. Stable.    Pure hypercholesterolemia  - LIPID PANEL; Future  - CMP; Future  Cont. Present meds.  ASA 81mg a day.   low sugar, low salt and  lipid diet, exercise 3 times a week d/w the pt.  Sees Dr. Graham, cardio.    Benign hypertension:  ASA 81mg a day.   low sugar, low salt and  lipid diet, exercise 3 times a week d/w the pt.  Sees Dr. Graham, cardio.stable.    History of breast cancer: doing well.No mammograms.      History of parathyroidectomy,History of hyperparathyroidism  - Doing well,  Dexa up to date. CMP.    Georgia was seen today for well adult.    Diagnoses and all orders for this visit:    Medicare annual wellness visit, subsequent    Type 2 diabetes mellitus with hyperglycemia, without long-term current use of insulin (HCC)  -     Hemoglobin A1C; Future  -     Lipid Panel; Future  -      Comp Metabolic Panel (14); Future  -     Microalb/Creat Ratio, Random Urine; Future    Vitamin D deficiency  -     Vitamin D; Future        Nonrheumatic aortic (valve) insufficiency: Dr. Graham f/u, asymptomatic.    Asymptomatic carotid artery stenosis, bilateral: Dr. Graham f/u, no symptoms.      Obesity (BMI 30.0-34.9):    low sugar, low salt and  lipid diet, exercise 3 times a week d/w the pt. Stable.        Localized edema; stable, doing well.      Spinal stenosis of lumbar region with neurogenic claudication  Chronic bilateral low back pain without sciatica  -     [facility] Referral for Acute Symptoms (Spine Center)    R upper eyelid lesion: referral to eye doctor.       The patient indicates understanding of these issues and agrees to the plan.  The patient is asked to return in  6 months for f/u.    Orders Placed This Encounter   Procedures    Hemoglobin A1C     Last A1C < 6.9 : Every 6 months  Last A1C >/= 6.9:  Every 3 months     Standing Status:   Future     Number of Occurrences:   1     Standing Expiration Date:   3/19/2025     Order Specific Question:   Release to patient     Answer:   Immediate    Lipid Panel     Every 6 months     Standing Status:   Future     Number of Occurrences:   1     Standing Expiration Date:   3/19/2025     Order Specific Question:   Release to patient     Answer:   Immediate    Comp Metabolic Panel (14)     Annually     Standing Status:   Future     Number of Occurrences:   1     Standing Expiration Date:   3/19/2025     Order Specific Question:   Release to patient     Answer:   Immediate    Microalb/Creat Ratio, Random Urine     Annually     Standing Status:   Future     Number of Occurrences:   1     Standing Expiration Date:   3/19/2025     Order Specific Question:   Release to patient     Answer:   Immediate    Vitamin D     Standing Status:   Future     Number of Occurrences:   1     Standing Expiration Date:   3/19/2025     Order Specific Question:    Please pick the scenario that best fits the purpose for ordering this test     Answer:   General Screening/Vit D deficiency (25-Hydroxy)     Order Specific Question:   Release to patient     Answer:   Immediate     VACCINATIONS - Influenza, Pneumococcal, Zoster, Tetanus     Immunization History   Administered Date(s) Administered    Covid-19 Vaccine Pfizer 30 mcg/0.3 ml 02/15/2021, 03/08/2021, 12/17/2021    FLU VAC High Dose 65 YRS & Older PRSV Free (73123) 12/13/2017, 10/06/2020, 11/02/2022    FLUAD High Dose 65 yr and older (30643) 12/13/2017    FLUZONE 6 months and older PFS 0.5 ml (12057) 09/28/2015    Flublok Quad Influenza Vaccine (70608) 12/17/2021    Fluvirin, 3 Years & >, Im 10/01/2013    Fluzone Vaccine Medicare () 01/23/2017, 10/06/2020    HIGH DOSE FLU 65 YRS AND OLDER PRSV FREE SINGLE D (96200) FLU CLINIC 10/14/2014, 01/24/2017, 11/02/2022    Influenza 12/28/2007, 10/20/2013, 10/08/2014, 01/24/2017    Pneumococcal (Prevnar 13) 04/29/2015    Pneumovax 23 12/28/2007    TD 04/07/2016    Td, Preserv Free 04/06/2016

## 2024-03-19 NOTE — TELEPHONE ENCOUNTER
Pt had AWV with Dr. Carrillo today. Daughter asking about Rolator order / referral that was previously placed and that she lost. The last one I see is from 2022 - does she need new one?    Pt daughter, Kristen, will pick-up in office when ready. Thank you!

## 2024-03-20 LAB
ALBUMIN SERPL-MCNC: 4.1 G/DL (ref 3.4–5)
ALBUMIN/GLOB SERPL: 1.3 {RATIO} (ref 1–2)
ALP LIVER SERPL-CCNC: 88 U/L
ALT SERPL-CCNC: 19 U/L
ANION GAP SERPL CALC-SCNC: 4 MMOL/L (ref 0–18)
AST SERPL-CCNC: 19 U/L (ref 15–37)
BILIRUB SERPL-MCNC: 1 MG/DL (ref 0.1–2)
BUN BLD-MCNC: 15 MG/DL (ref 9–23)
CALCIUM BLD-MCNC: 9.5 MG/DL (ref 8.5–10.1)
CHLORIDE SERPL-SCNC: 109 MMOL/L (ref 98–112)
CHOLEST SERPL-MCNC: 159 MG/DL (ref ?–200)
CO2 SERPL-SCNC: 27 MMOL/L (ref 21–32)
CREAT BLD-MCNC: 0.9 MG/DL
EGFRCR SERPLBLD CKD-EPI 2021: 63 ML/MIN/1.73M2 (ref 60–?)
FASTING PATIENT LIPID ANSWER: YES
FASTING STATUS PATIENT QL REPORTED: YES
GLOBULIN PLAS-MCNC: 3.1 G/DL (ref 2.8–4.4)
GLUCOSE BLD-MCNC: 128 MG/DL (ref 70–99)
HDLC SERPL-MCNC: 54 MG/DL (ref 40–59)
LDLC SERPL CALC-MCNC: 78 MG/DL (ref ?–100)
NONHDLC SERPL-MCNC: 105 MG/DL (ref ?–130)
OSMOLALITY SERPL CALC.SUM OF ELEC: 292 MOSM/KG (ref 275–295)
POTASSIUM SERPL-SCNC: 3.8 MMOL/L (ref 3.5–5.1)
PROT SERPL-MCNC: 7.2 G/DL (ref 6.4–8.2)
SODIUM SERPL-SCNC: 140 MMOL/L (ref 136–145)
TRIGL SERPL-MCNC: 160 MG/DL (ref 30–149)
VLDLC SERPL CALC-MCNC: 25 MG/DL (ref 0–30)

## 2024-03-21 ENCOUNTER — SPINE CENTER NAVIGATION (OUTPATIENT)
Dept: SURGERY | Facility: CLINIC | Age: 84
End: 2024-03-21

## 2024-03-21 DIAGNOSIS — M54.50 LUMBAR PAIN: Primary | ICD-10-CM

## 2024-03-21 NOTE — PROGRESS NOTES
Spine Center Referral Navigation Encounter Note    Referred by: Peg Carrillo MD    HPI: Pt has bilateral numbness, hands pain, worse at night, moderate to severe, h/o carpal tunnel syndrome. Bothersome, worsening, last months. Per Peg Carrillo MD note    Subjective Symptoms: Low back. Sciatica previously but resolved. Tingling and numbness in hands. Being treated for carpal tunnel.     Subjective Pertinent Negatives: No radiation down legs. No new bowel or bladder incontinent. No falls. No numbness, weakness or tingling in legs.    Physical Therapy: Last year    Medications for Spine Symptoms: None    Imaging: MRI 2022    Previous Spine Injections: Yes    Previous Spine Surgery: Yes    Previously Seen Spine Care Providers: Hammad (for carpal tunnel), Alvaro Goncalves    Information above is from chart review and patient reported.     Referred to: [facility]     Spoke to daughter.

## 2024-03-21 NOTE — TELEPHONE ENCOUNTER
Medication(s) to Refill:   Requested Prescriptions     Pending Prescriptions Disp Refills    mupirocin 2 % External Cream  0     Sig: Apply topically 2 (two) times daily as needed.         Reason for Medication Refill being sent to Provider / Reason Protocol Failed:  [] 90 day refill has already been granted  [] Blood Pressure out of range  [] Labs Abnormal/over due  [] Medication not previously prescribed by Provider  [x] Non-Protocol Medication  [] Controlled Substance   [] Due for appointment- no future appointment scheduled  [] No Follow up specified      Last Time Medication was Filled:  not on file       Last Office Visit with PCP: 3/19/2024    When Patient was Due Back to the Office:    (from when PCP last addressed condition)    Future Appointments:  Future Appointments   Date Time Provider Department Center   3/27/2024  1:30 PM [id number]   9/24/2024 12:20 PM Peg Carrillo MD [facility] [place]         Last Blood Pressures:  BP Readings from Last 2 Encounters:   03/19/24 122/80   12/19/23 118/76     Action taken:  [] Refill approved per protocol  [x] Routing to provider for approval

## 2024-03-22 RX ORDER — MUPIROCIN CALCIUM 20 MG/G
1 CREAM TOPICAL 2 TIMES DAILY PRN
Qty: 1 EACH | Refills: 1 | Status: SHIPPED | OUTPATIENT
Start: 2024-03-22

## 2024-03-27 ENCOUNTER — OFFICE VISIT (OUTPATIENT)
Dept: PHYSICAL MEDICINE AND REHAB | Facility: CLINIC | Age: 84
End: 2024-03-27
Payer: MEDICARE

## 2024-03-27 VITALS — BODY MASS INDEX: 32.02 KG/M2 | WEIGHT: 174 LBS | HEIGHT: 62 IN | HEART RATE: 87 BPM | OXYGEN SATURATION: 97 %

## 2024-03-27 DIAGNOSIS — M48.062 LUMBAR STENOSIS WITH NEUROGENIC CLAUDICATION: Primary | ICD-10-CM

## 2024-03-27 PROCEDURE — 99204 OFFICE O/P NEW MOD 45 MIN: CPT | Performed by: PHYSICAL MEDICINE & REHABILITATION

## 2024-03-27 RX ORDER — GABAPENTIN 100 MG/1
CAPSULE ORAL
Qty: 60 CAPSULE | Refills: 0 | Status: SHIPPED | OUTPATIENT
Start: 2024-03-27

## 2024-03-27 NOTE — H&P
History and Physical    C/C:   Chief Complaint   Patient presents with    New Patient     New patient is here with complaints of lumbar pain. Daily N//T in hands due to carpal tunnel. Has completed physical therapy. Pain 0/10 pain coming whenever she's active. Currently not taking any pain meds or muscle relaxers. Sates she's had back injections as well.      HPI: This is an 84-year-old female with past medical history of diabetes, hypertension, breast cancer who presents with chronic axial lower back pain across the waistline without radiation into the bilateral lower extremities, no associated numbness or tingling.  Symptoms do worsen with standing and walking.  She walks with a cane in large part due to fear of falling more than to relieve back pain.  Can walk comfortably while pushing a grocery cart, not able to walk within a grocery store due to increasing lower back pain.  She is usually able to get around her household without provoking much lower back pain, though there can be times when she has increased lower back pain that is relieved with sitting.  She has done physical therapy years ago when she had radiating pain into the bilateral lower extremities.  No recent physical therapy.  Has had injections to the pain clinic, epidural steroid injections and more recently medial nerve branch blocks and then radiofrequency ablation on 6/20/2023 without relief of lower back pain.  Last MRI of the lumbar spine was done in May 2022.  She has no history of lumbar spine surgeries.    Allergies:   Allergies   Allergen Reactions    Prochlorperazine CONFUSION     confusion  TABS; acting goofy      Adhesive Tape OTHER (SEE COMMENTS)     Skin ripped    Compazine     Compazine      TABS; acting goofy          Patient-reported ROS  Constitutional  Sleep Disturbance: denies  Chills: denies  Fever: denies  Weight Gain: denies  Weight Loss: denies   Cardiovascular  Chest Pain: denies  Irregular Heartbeat: denies    Respiratory  Painful Breathing: denies  Wheezing: denies   Gastrointestinal  Bowel Incontinence: denies  Heartburn: denies  Abdominal Pain: denies  Blood in Stool : denies  Rectal Pain: denies   Hematology  Easy Bruising: denies  Easy Bleeding: denies   Genitourinary  Difficulty Urinating: denies  Bladder Incontinence: denies  Pelvic Pain: denies  Painful Urination: denies   Musculoskeletal  Joint Stiffness: admits  Painful Joints: admits  Tailbone Pain: denies  Swollen Joints: denies   Peripheral Vascular  Swelling of Legs/Feet: admits (left leg)  Cold Extremities: denies   Skin  Open Sores: denies  Nodules or Lumps: denies  Rash: denies   Neurological  Loss of Strength Since last Visit: denies  Tingling/Numbness: admits (both hands.)  Balance: denies   Psychiatric  Anxiety: denies  Depressed Mood: denies      Physical exam:  Pulse 87   Ht 62\"   Wt 174 lb (78.9 kg)   LMP 04/25/1985   SpO2 97%   BMI 31.83 kg/m²     Lumbar spine exam:    No skin rash lumbar/upper sacral region  No pain with lumbar flexion. + pain with lumbar extension.  No tenderness to palpation bilateral lumbar paraspinals. No ttp bilateral PSIS.  5/5 LE strength b/l in HF, KE, ADF, ankle eversion, ankle inversion, PF  SILT b/l LE  2/4 quad, gastrocs reflexes b/l  Facet loading negative b/l  Seated slump test negative  SLR negative b/l    Imaging: MRI lumbar spine dated 5/20/2022 independently reviewed, as was report. She has severe central stenosis at L3-4, moderate at L4-5. Multilevel facet arthropathy. Grade I spondylolisthesis of L5 on S1.    Assessment and plan:  1) lumbar stenosis  2) lumbar facet arthropathy  3) DM  4) anxiety    Recommend gabapentin 100mg at bedtime x1 week, then 100mg BID if no side effects and no benefit. I also recommend physical therapy for neutral to flexion lumbar stabilization. I did tell her that often it takes physical therapy and months of home exercise program to see gradual improvement in back pain,  increased tolerance to standing and walking. Can consider bilateral L4 TFESI under fluoroscopy in the future, if back pain worsens.     Follow up in 4-6 weeks.     Aston Gonzalez DO  Physical Medicine and Rehabilitation  [facility]

## 2024-05-08 ENCOUNTER — OFFICE VISIT (OUTPATIENT)
Dept: PHYSICAL MEDICINE AND REHAB | Facility: CLINIC | Age: 84
End: 2024-05-08
Payer: MEDICARE

## 2024-05-08 VITALS
HEIGHT: 62 IN | RESPIRATION RATE: 18 BRPM | WEIGHT: 174 LBS | OXYGEN SATURATION: 95 % | HEART RATE: 87 BPM | BODY MASS INDEX: 32.02 KG/M2

## 2024-05-08 DIAGNOSIS — M48.062 LUMBAR STENOSIS WITH NEUROGENIC CLAUDICATION: Primary | ICD-10-CM

## 2024-05-08 PROCEDURE — 99213 OFFICE O/P EST LOW 20 MIN: CPT | Performed by: PHYSICAL MEDICINE & REHABILITATION

## 2024-05-08 RX ORDER — SPIRONOLACTONE 25 MG/1
12.5 TABLET ORAL DAILY
Qty: 15 TABLET | Refills: 2 | Status: SHIPPED | OUTPATIENT
Start: 2024-05-08

## 2024-05-08 RX ORDER — GABAPENTIN 100 MG/1
CAPSULE ORAL
Qty: 360 CAPSULE | Refills: 0 | Status: SHIPPED | OUTPATIENT
Start: 2024-05-08

## 2024-05-08 NOTE — PATIENT INSTRUCTIONS
We will increase the gabapentin. Take 200mg twice daily for 1 week, then 300mg twice daily. Stop the medication if it gives you side effects.     In 1 month  I would like to hear how you are doing either through MyChart or telephone call.

## 2024-05-08 NOTE — PROGRESS NOTES
Progress note    C/C:   Chief Complaint   Patient presents with    Follow - Up     Low back pain, Pt states that she       HPI: 84-year-old female presents for follow-up of chronic axial lower back pain across the waistline that worsens with standing and walking, presumed from lumbar stenosis, facet arthropathy or both.  On last visit she was started on gabapentin 100 mg twice daily and physical therapy for neutral to flexion lumbar stabilization.  She was instructed that improvement in standing and walking may be slow and gradual, and consider bilateral L4 TFESI if not improving or if symptoms worsen.     Today states no improvement.  Gabapentin 100 mg twice daily without side effects, but also without benefit.  She continues to have axial lower back pain with standing and walking.  She is able to walk through a grocery store without needing to stop as long as she is using a grocery cart.  The back pain occurs variably with standing and walking; on some days she is able to walk longer distances, and others shorter distances before needing to stop and sit due to pain.  The same occurs with standing.  Usually has to take at least 1 seated rest break while cooking.     Did not do physical therapy.     Pertinent allergies:   Allergies   Allergen Reactions    Prochlorperazine CONFUSION     confusion  TABS; acting goofy      Adhesive Tape OTHER (SEE COMMENTS)     Skin ripped    Compazine     Compazine      TABS; acting goofy          Physical exam:  Pulse 87   Resp 18   Ht 62\"   Wt 174 lb (78.9 kg)   LMP 04/25/1985   SpO2 95%   BMI 31.83 kg/m²      Lumbar spine exam:    No skin rash lumbar/upper sacral region  No pain with lumbar flexion. No pain with lumbar extension.  No tenderness to palpation bilateral lumbar paraspinals.  5/5 LE strength b/l  SILT b/l LE  2/4 quad, gastrocs reflexes b/l  Facet loading negative b/l    Imaging: No new imaging to review    Assessment and plan  Lumbar facet arthropathy  Lumbar  stenosis  DM  Anxiety    Recommend increasing the gabapentin slowly; 200mg BID x1 week, then 300mg BID. She should contact the clinic in 1 month for a status update; may continue to uptitrate further. Other option remains PT for neutral to flexion lumbar stabilization, or bilateral L4 TFESI under fluoroscopy.     Follow up in 2 months.     Aston Gonzalez DO  Physical Medicine and Rehabilitation  [facility]

## 2024-05-09 ENCOUNTER — PATIENT MESSAGE (OUTPATIENT)
Dept: FAMILY MEDICINE CLINIC | Facility: CLINIC | Age: 84
End: 2024-05-09

## 2024-05-09 NOTE — TELEPHONE ENCOUNTER
From: Georgia Tabor  To: Peg Carrillo  Sent: 5/9/2024 12:17 PM CDT  Subject: EAR    DR. Ruvalcaba, I'm having pain and burning in my right ear~! Should I be taking anything for relief?  Thank You,  Georgia

## 2024-05-21 RX ORDER — LEVOTHYROXINE SODIUM 0.05 MG/1
50 TABLET ORAL DAILY
Qty: 90 TABLET | Refills: 3 | Status: SHIPPED | OUTPATIENT
Start: 2024-05-21

## 2024-07-05 ENCOUNTER — HOSPITAL ENCOUNTER (OUTPATIENT)
Dept: ULTRASOUND IMAGING | Age: 84
Discharge: HOME | End: 2024-07-05
Attending: INTERNAL MEDICINE
Payer: MEDICARE

## 2024-07-05 DIAGNOSIS — I65.23 ASYMPTOMATIC CAROTID ARTERY STENOSIS, BILATERAL: ICD-10-CM

## 2024-07-05 DIAGNOSIS — E78.00 PURE HYPERCHOLESTEROLEMIA: ICD-10-CM

## 2024-07-05 DIAGNOSIS — I10 HYPERTENSION, BENIGN: ICD-10-CM

## 2024-07-05 PROCEDURE — 93880 EXTRACRANIAL BILAT STUDY: CPT | Performed by: INTERNAL MEDICINE

## 2024-08-05 ENCOUNTER — APPOINTMENT (OUTPATIENT)
Dept: GENERAL RADIOLOGY | Facility: HOSPITAL | Age: 84
End: 2024-08-05
Attending: EMERGENCY MEDICINE
Payer: MEDICARE

## 2024-08-05 ENCOUNTER — HOSPITAL ENCOUNTER (INPATIENT)
Facility: HOSPITAL | Age: 84
LOS: 1 days | Discharge: HOME | End: 2024-08-06
Attending: EMERGENCY MEDICINE | Admitting: INTERNAL MEDICINE
Payer: MEDICARE

## 2024-08-05 ENCOUNTER — APPOINTMENT (OUTPATIENT)
Dept: CT IMAGING | Facility: HOSPITAL | Age: 84
End: 2024-08-05
Attending: STUDENT IN AN ORGANIZED HEALTH CARE EDUCATION/TRAINING PROGRAM
Payer: MEDICARE

## 2024-08-05 DIAGNOSIS — I65.23 BILATERAL CAROTID ARTERY STENOSIS: ICD-10-CM

## 2024-08-05 DIAGNOSIS — I10 MALIGNANT HYPERTENSION: Primary | ICD-10-CM

## 2024-08-05 DIAGNOSIS — R79.89 ELEVATED TROPONIN: ICD-10-CM

## 2024-08-05 PROBLEM — R73.9 HYPERGLYCEMIA: Status: ACTIVE | Noted: 2024-08-05

## 2024-08-05 PROBLEM — I16.0 HYPERTENSIVE URGENCY: Status: ACTIVE | Noted: 2024-08-05

## 2024-08-05 LAB
ALBUMIN SERPL-MCNC: 4.7 G/DL (ref 3.2–4.8)
ALBUMIN/GLOB SERPL: 1.9 {RATIO} (ref 1–2)
ALP LIVER SERPL-CCNC: 92 U/L
ALT SERPL-CCNC: 14 U/L
ANION GAP SERPL CALC-SCNC: 6 MMOL/L (ref 0–18)
AST SERPL-CCNC: 23 U/L (ref ?–34)
BASOPHILS # BLD AUTO: 0.03 X10(3) UL (ref 0–0.2)
BASOPHILS NFR BLD AUTO: 0.4 %
BILIRUB SERPL-MCNC: 1.1 MG/DL (ref 0.2–1.1)
BILIRUB UR QL STRIP.AUTO: NEGATIVE
BUN BLD-MCNC: 10 MG/DL (ref 9–23)
CALCIUM BLD-MCNC: 10 MG/DL (ref 8.7–10.4)
CHLORIDE SERPL-SCNC: 104 MMOL/L (ref 98–112)
CHOLEST SERPL-MCNC: 176 MG/DL (ref ?–200)
CLARITY UR REFRACT.AUTO: CLEAR
CO2 SERPL-SCNC: 27 MMOL/L (ref 21–32)
CREAT BLD-MCNC: 0.86 MG/DL
EGFRCR SERPLBLD CKD-EPI 2021: 67 ML/MIN/1.73M2 (ref 60–?)
EOSINOPHIL # BLD AUTO: 0.13 X10(3) UL (ref 0–0.7)
EOSINOPHIL NFR BLD AUTO: 1.9 %
ERYTHROCYTE [DISTWIDTH] IN BLOOD BY AUTOMATED COUNT: 12.4 %
FLUAV + FLUBV RNA SPEC NAA+PROBE: NEGATIVE
FLUAV + FLUBV RNA SPEC NAA+PROBE: NEGATIVE
GLOBULIN PLAS-MCNC: 2.5 G/DL (ref 2–3.5)
GLUCOSE BLD-MCNC: 166 MG/DL (ref 70–99)
GLUCOSE UR STRIP.AUTO-MCNC: NORMAL MG/DL
HCT VFR BLD AUTO: 40.9 %
HDLC SERPL-MCNC: 57 MG/DL (ref 40–59)
HGB BLD-MCNC: 13.5 G/DL
IMM GRANULOCYTES # BLD AUTO: 0.03 X10(3) UL (ref 0–1)
IMM GRANULOCYTES NFR BLD: 0.4 %
KETONES UR STRIP.AUTO-MCNC: NEGATIVE MG/DL
LDLC SERPL CALC-MCNC: 96 MG/DL (ref ?–100)
LEUKOCYTE ESTERASE UR QL STRIP.AUTO: NEGATIVE
LYMPHOCYTES # BLD AUTO: 0.96 X10(3) UL (ref 1–4)
LYMPHOCYTES NFR BLD AUTO: 14 %
MCH RBC QN AUTO: 30.3 PG (ref 26–34)
MCHC RBC AUTO-ENTMCNC: 33 G/DL (ref 31–37)
MCV RBC AUTO: 91.7 FL
MONOCYTES # BLD AUTO: 0.49 X10(3) UL (ref 0.1–1)
MONOCYTES NFR BLD AUTO: 7.2 %
NEUTROPHILS # BLD AUTO: 5.2 X10 (3) UL (ref 1.5–7.7)
NEUTROPHILS # BLD AUTO: 5.2 X10(3) UL (ref 1.5–7.7)
NEUTROPHILS NFR BLD AUTO: 76.1 %
NITRITE UR QL STRIP.AUTO: NEGATIVE
NONHDLC SERPL-MCNC: 119 MG/DL (ref ?–130)
OSMOLALITY SERPL CALC.SUM OF ELEC: 287 MOSM/KG (ref 275–295)
PH UR STRIP.AUTO: 5.5 [PH] (ref 5–8)
PLATELET # BLD AUTO: 209 10(3)UL (ref 150–450)
POTASSIUM SERPL-SCNC: 4.2 MMOL/L (ref 3.5–5.1)
PROT SERPL-MCNC: 7.2 G/DL (ref 5.7–8.2)
PROT UR STRIP.AUTO-MCNC: NEGATIVE MG/DL
Q-T INTERVAL: 446 MS
QRS DURATION: 88 MS
QTC CALCULATION (BEZET): 446 MS
R AXIS: -6 DEGREES
RBC # BLD AUTO: 4.46 X10(6)UL
RBC UR QL AUTO: NEGATIVE
RSV RNA SPEC NAA+PROBE: NEGATIVE
SARS-COV-2 RNA RESP QL NAA+PROBE: NOT DETECTED
SODIUM SERPL-SCNC: 137 MMOL/L (ref 136–145)
SP GR UR STRIP.AUTO: 1.02 (ref 1–1.03)
T AXIS: 15 DEGREES
TRIGL SERPL-MCNC: 131 MG/DL (ref 30–149)
TROPONIN I SERPL HS-MCNC: 325 NG/L
TROPONIN I SERPL HS-MCNC: 460 NG/L
TROPONIN I SERPL HS-MCNC: 65 NG/L
UROBILINOGEN UR STRIP.AUTO-MCNC: NORMAL MG/DL
VENTRICULAR RATE: 60 BPM
VLDLC SERPL CALC-MCNC: 22 MG/DL (ref 0–30)
WBC # BLD AUTO: 6.8 X10(3) UL (ref 4–11)

## 2024-08-05 PROCEDURE — 71045 X-RAY EXAM CHEST 1 VIEW: CPT | Performed by: EMERGENCY MEDICINE

## 2024-08-05 PROCEDURE — 99223 1ST HOSP IP/OBS HIGH 75: CPT | Performed by: STUDENT IN AN ORGANIZED HEALTH CARE EDUCATION/TRAINING PROGRAM

## 2024-08-05 PROCEDURE — 70498 CT ANGIOGRAPHY NECK: CPT | Performed by: STUDENT IN AN ORGANIZED HEALTH CARE EDUCATION/TRAINING PROGRAM

## 2024-08-05 RX ORDER — LEVOTHYROXINE SODIUM 0.05 MG/1
50 TABLET ORAL
Status: DISCONTINUED | OUTPATIENT
Start: 2024-08-06 | End: 2024-08-06

## 2024-08-05 RX ORDER — ENEMA 19; 7 G/133ML; G/133ML
1 ENEMA RECTAL ONCE AS NEEDED
Status: DISCONTINUED | OUTPATIENT
Start: 2024-08-05 | End: 2024-08-06

## 2024-08-05 RX ORDER — LABETALOL HYDROCHLORIDE 5 MG/ML
10 INJECTION, SOLUTION INTRAVENOUS EVERY 4 HOURS PRN
Status: DISCONTINUED | OUTPATIENT
Start: 2024-08-05 | End: 2024-08-06

## 2024-08-05 RX ORDER — CARVEDILOL 12.5 MG/1
6.25 TABLET ORAL ONCE
Status: COMPLETED | OUTPATIENT
Start: 2024-08-05 | End: 2024-08-05

## 2024-08-05 RX ORDER — CARVEDILOL 6.25 MG/1
6.25 TABLET ORAL
Status: DISCONTINUED | OUTPATIENT
Start: 2024-08-05 | End: 2024-08-06

## 2024-08-05 RX ORDER — GUAIFENESIN 600 MG/1
600 TABLET, EXTENDED RELEASE ORAL 2 TIMES DAILY PRN
Status: DISCONTINUED | OUTPATIENT
Start: 2024-08-05 | End: 2024-08-06

## 2024-08-05 RX ORDER — SERTRALINE HYDROCHLORIDE 25 MG/1
12.5 TABLET, FILM COATED ORAL NIGHTLY
Status: DISCONTINUED | OUTPATIENT
Start: 2024-08-05 | End: 2024-08-06

## 2024-08-05 RX ORDER — LOSARTAN POTASSIUM 25 MG/1
25 TABLET ORAL DAILY
Status: DISCONTINUED | OUTPATIENT
Start: 2024-08-06 | End: 2024-08-06

## 2024-08-05 RX ORDER — POLYETHYLENE GLYCOL 3350 17 G/17G
17 POWDER, FOR SOLUTION ORAL DAILY PRN
Status: DISCONTINUED | OUTPATIENT
Start: 2024-08-05 | End: 2024-08-06

## 2024-08-05 RX ORDER — ASPIRIN 81 MG/1
81 TABLET ORAL EVERY EVENING
Status: DISCONTINUED | OUTPATIENT
Start: 2024-08-05 | End: 2024-08-06

## 2024-08-05 RX ORDER — ONDANSETRON 2 MG/ML
4 INJECTION INTRAMUSCULAR; INTRAVENOUS EVERY 6 HOURS PRN
Status: DISCONTINUED | OUTPATIENT
Start: 2024-08-05 | End: 2024-08-06

## 2024-08-05 RX ORDER — ENOXAPARIN SODIUM 100 MG/ML
40 INJECTION SUBCUTANEOUS DAILY
Status: DISCONTINUED | OUTPATIENT
Start: 2024-08-06 | End: 2024-08-06

## 2024-08-05 RX ORDER — HYDRALAZINE HYDROCHLORIDE 20 MG/ML
5 INJECTION INTRAMUSCULAR; INTRAVENOUS EVERY 4 HOURS PRN
Status: DISCONTINUED | OUTPATIENT
Start: 2024-08-05 | End: 2024-08-06

## 2024-08-05 RX ORDER — ONDANSETRON 2 MG/ML
4 INJECTION INTRAMUSCULAR; INTRAVENOUS ONCE
Status: COMPLETED | OUTPATIENT
Start: 2024-08-05 | End: 2024-08-05

## 2024-08-05 RX ORDER — SENNOSIDES 8.6 MG
17.2 TABLET ORAL NIGHTLY PRN
Status: DISCONTINUED | OUTPATIENT
Start: 2024-08-05 | End: 2024-08-06

## 2024-08-05 RX ORDER — METOCLOPRAMIDE HYDROCHLORIDE 5 MG/ML
5 INJECTION INTRAMUSCULAR; INTRAVENOUS EVERY 8 HOURS PRN
Status: DISCONTINUED | OUTPATIENT
Start: 2024-08-05 | End: 2024-08-06

## 2024-08-05 RX ORDER — ACETAMINOPHEN 500 MG
500 TABLET ORAL EVERY 4 HOURS PRN
Status: DISCONTINUED | OUTPATIENT
Start: 2024-08-05 | End: 2024-08-06

## 2024-08-05 RX ORDER — MELATONIN
3 NIGHTLY PRN
Status: DISCONTINUED | OUTPATIENT
Start: 2024-08-05 | End: 2024-08-06

## 2024-08-05 RX ORDER — SPIRONOLACTONE 25 MG/1
12.5 TABLET ORAL DAILY
Status: DISCONTINUED | OUTPATIENT
Start: 2024-08-06 | End: 2024-08-06

## 2024-08-05 RX ORDER — BUSPIRONE HYDROCHLORIDE 10 MG/1
5 TABLET ORAL ONCE
Status: COMPLETED | OUTPATIENT
Start: 2024-08-05 | End: 2024-08-05

## 2024-08-05 RX ORDER — BUSPIRONE HYDROCHLORIDE 5 MG/1
5 TABLET ORAL 2 TIMES DAILY
Status: DISCONTINUED | OUTPATIENT
Start: 2024-08-05 | End: 2024-08-06

## 2024-08-05 RX ORDER — SERTRALINE HYDROCHLORIDE 25 MG/1
12.5 TABLET, FILM COATED ORAL ONCE
Status: DISCONTINUED | OUTPATIENT
Start: 2024-08-05 | End: 2024-08-06

## 2024-08-05 RX ORDER — BISACODYL 10 MG
10 SUPPOSITORY, RECTAL RECTAL
Status: DISCONTINUED | OUTPATIENT
Start: 2024-08-05 | End: 2024-08-06

## 2024-08-05 RX ORDER — ALPRAZOLAM 0.25 MG/1
0.25 TABLET ORAL NIGHTLY PRN
COMMUNITY

## 2024-08-05 RX ORDER — SODIUM CHLORIDE 9 MG/ML
INJECTION, SOLUTION INTRAVENOUS CONTINUOUS
Status: ACTIVE | OUTPATIENT
Start: 2024-08-05 | End: 2024-08-05

## 2024-08-05 RX ORDER — ENALAPRILAT 1.25 MG/ML
1.25 INJECTION INTRAVENOUS ONCE
Status: COMPLETED | OUTPATIENT
Start: 2024-08-05 | End: 2024-08-05

## 2024-08-05 RX ORDER — SODIUM CHLORIDE 9 MG/ML
125 INJECTION, SOLUTION INTRAVENOUS CONTINUOUS
Status: DISCONTINUED | OUTPATIENT
Start: 2024-08-05 | End: 2024-08-06

## 2024-08-05 RX ORDER — ECHINACEA PURPUREA EXTRACT 125 MG
1 TABLET ORAL
Status: DISCONTINUED | OUTPATIENT
Start: 2024-08-05 | End: 2024-08-06

## 2024-08-05 RX ORDER — BENZONATATE 100 MG/1
200 CAPSULE ORAL 3 TIMES DAILY PRN
Status: DISCONTINUED | OUTPATIENT
Start: 2024-08-05 | End: 2024-08-06

## 2024-08-05 RX ORDER — GABAPENTIN 300 MG/1
300 CAPSULE ORAL 2 TIMES DAILY
Status: DISCONTINUED | OUTPATIENT
Start: 2024-08-05 | End: 2024-08-06

## 2024-08-05 NOTE — ED INITIAL ASSESSMENT (HPI)
Not feeling well  since last night nausea , unable to sleep last night . Patient check her blood pressure  at home   it was high vomiting in the triage . Known hypertensive on regular medications. Denies chest pain and shortness  of breath

## 2024-08-05 NOTE — ED PROVIDER NOTES
Patient Seen in: [facility] Emergency Department      History     Chief Complaint   Patient presents with    Hypertension     Stated Complaint: not feeling well, elevated blood pressure    Subjective:   HPI    States not generally feeling well, is nauseated, did not sleep well last night thinks that may be because she turned off the air conditioning and then she was hot throughout the night she was up and down most the night states she was urinating a lot her daughter checked her blood pressure today and it was nearly 200/100 she states she has never had blood pressure like that in the past  She feels much better since receiving Zofran    States she feels exactly like she did feel when she had Takotsubo's myocarditis many years ago  Objective:   Past Medical History:    Acute bronchitis    Acute maxillary sinusitis    Anxiety state, unspecified    Arthritis    Back problem    Breast CA (HCC)    Calculus of kidney    Cancer (HCC)    Cancer, colon (HCC)    Exposure to unspecified radiation    /breast    Fatigue    HIGH BLOOD PRESSURE    HIGH CHOLESTEROL    Hyperparathyroidism (HCC)    Hypothyroid    Malignant neoplasm of breast (female), unspecified site    Myocardial infarction (HCC)    Other and unspecified hyperlipidemia    Pain in joint, lower leg    Unspecified disorder of thyroid    Unspecified essential hypertension    Valvular disease              Past Surgical History:   Procedure Laterality Date    Breast surgery procedure unlisted      lumpectomy, right    Cataract      Cataract surgery, complex Bilateral 2021    Cholecystectomy  1974    Colon surgery      removal od cancerous lesion    Colonoscopy      Hysterectomy      not bso    Lumpectomy right      Invasive and DCIS    Chanel biopsy stereo nodule 1 site right (cpt=19081)      DCIS    Nm parathyroid surgery  (cpt=78070)            Other  2006    lithotripsy    Other      parathyroid resection    Other surgical  history N/A 03/05/2015    Procedure: ANTERIOR POSTERIOR REPAIR;  Surgeon: All Armenta MD;  Location: [facility] MAIN OR    Radiation right      Thyroidectomy                  Social History     Socioeconomic History    Marital status:    Tobacco Use    Smoking status: Never    Smokeless tobacco: Never   Vaping Use    Vaping status: Never Used   Substance and Sexual Activity    Alcohol use: Yes     Comment: holidays    Drug use: Never   Other Topics Concern    Caffeine Concern No    Stress Concern No    Weight Concern No    Special Diet No    Exercise Yes    Seat Belt Yes   Social History Narrative    ** Merged History Encounter **          Social Determinants of Health     Food Insecurity: No Food Insecurity (8/5/2024)    Food Insecurity     Food Insecurity: Never true   Transportation Needs: No Transportation Needs (8/5/2024)    Transportation Needs     Lack of Transportation: No   Housing Stability: Low Risk  (8/5/2024)    Housing Stability     Housing Instability: No              Review of Systems    Positive for stated Chief Complaint: Hypertension    Other systems are as noted in HPI.  Constitutional and vital signs reviewed.      All other systems reviewed and negative except as noted above.    Physical Exam     ED Triage Vitals [08/05/24 1358]   BP (!) 179/109   Pulse 63   Resp 19   Temp 98.3 °F (36.8 °C)   Temp src Temporal   SpO2 95 %   O2 Device None (Room air)       Current Vitals:   No data recorded          Physical Exam    Talkative pleasant 84-year-old patient lying on emergency department bed her daughter is at bedside her HEENT exam reveals pupils are equal round reactive to light her extraocular movements are intact her neck is supple her lungs are clear to auscultation heart has regular rate and rhythm without murmurs rubs or gallops her upper and lower extremities are benign other than some mild edema of her bilateral lower extremities.  She speaking in full and complete sentences.    ED  Course     Labs Reviewed   COMP METABOLIC PANEL (14) - Abnormal; Notable for the following components:       Result Value    Glucose 166 (*)     All other components within normal limits   TROPONIN I HIGH SENSITIVITY - Abnormal; Notable for the following components:    Troponin I (High Sensitivity) 65 (*)     All other components within normal limits   TROPONIN I HIGH SENSITIVITY - Abnormal; Notable for the following components:    Troponin I (High Sensitivity) 325 (*)     All other components within normal limits   TROPONIN I HIGH SENSITIVITY - Abnormal; Notable for the following components:    Troponin I (High Sensitivity) 460 (*)     All other components within normal limits   COMP METABOLIC PANEL (14) - Abnormal; Notable for the following components:    Glucose 122 (*)     All other components within normal limits   TROPONIN I HIGH SENSITIVITY - Abnormal; Notable for the following components:    Troponin I (High Sensitivity) 479 (*)     All other components within normal limits   TROPONIN I HIGH SENSITIVITY - Abnormal; Notable for the following components:    Troponin I (High Sensitivity) 418 (*)     All other components within normal limits   CBC W/ DIFFERENTIAL - Abnormal; Notable for the following components:    Lymphocyte Absolute 0.96 (*)     All other components within normal limits   LIPID PANEL - Normal   SARS-COV-2/FLU A AND B/RSV BY PCR ([employer]) - Normal    Narrative:     This test is intended for the qualitative detection and differentiation of SARS-CoV-2, influenza A, influenza B, and respiratory syncytial virus (RSV) viral RNA in nasopharyngeal or nares swabs from individuals suspected of respiratory viral infection consistent with COVID-19 by their healthcare provider. Signs and symptoms of respiratory viral infection due to SARS-CoV-2, influenza, and RSV can be similar.    Test performed using the Xpert Xpress SARS-CoV-2/FLU/RSV (real time RT-PCR)  assay on the elarmpert instrument, [employer],  [place] [address].   This test is being used under the Food and Drug Administration's Emergency Use Authorization.    The authorized Fact Sheet for Healthcare Providers for this assay is available upon request from the laboratory.   CBC WITH DIFFERENTIAL WITH PLATELET    Narrative:     The following orders were created for panel order CBC With Differential With Platelet.  Procedure                               Abnormality         Status                     ---------                               -----------         ------                     CBC W/ DIFFERENTIAL[[id number]]          Abnormal            Final result                 Please view results for these tests on the individual orders.   URINALYSIS WITH CULTURE REFLEX   CBC WITH DIFFERENTIAL WITH PLATELET    Narrative:     The following orders were created for panel order CBC With Differential With Platelet.  Procedure                               Abnormality         Status                     ---------                               -----------         ------                     CBC W/ DIFFERENTIAL[[id number]]                              Final result                 Please view results for these tests on the individual orders.   RAINBOW DRAW LAVENDER   RAINBOW DRAW LIGHT GREEN   RAINBOW DRAW BLUE   RAINBOW DRAW GOLD   CBC W/ DIFFERENTIAL     EKG    Rate, intervals and axes as noted on EKG Report.  Rate: 54  Rhythm: Sinus Rhythm  Reading: Bradycardic with a ventricular rate of 54 bpm, general T wave flattening with a sinus arrhythmia noted.  It is an abnormal EKG.  No acute signs of ST elevation or acute ischemia.              ED Course as of 08/12/24 0804  ------------------------------------------------------------  Time: 08/05 1549  Value: BP(!): 213/106  Comment: (Reviewed)  ------------------------------------------------------------  Time: 08/05 1815  Value: Troponin I (High Sensitivity)(!!): 65  Comment:  (Reviewed)  ------------------------------------------------------------  Time: 08/05 1819  Value: BP(!): 162/85  Comment: Certainly the blood pressure is improving, but will need to continue to trend the troponins.  I do not see signs of myocarditis but given advanced age it could be an atypical presentation of coronary presented I certainly do not want to overcorrect her blood pressure than with her bilateral carotid stenosis     Patient with very very elevated blood pressure she states certainly not generally that high         MDM      Delightful 84-year-old presents to the emergency department with her daughter she was initially very nauseous upon arrival but feeling better after Zofran she states that she feels generally unwell generally weak generally short of breath.  Arrives with a blood pressure significantly elevated states that she feels the same way she did when she had Takotsubo's myocarditis.  Differential diagnosis includes but is not limited to malignant hypertension, acute renal insufficiency, coronary artery disease with acute coronary syndrome, COVID, underlying infection of some kind like a UTI, patient did not sleep most of the night last night because she turned off her air conditioning but I am really not sure why she would do such a thing it has been incredibly hot with heat advisory she states that she was trying to save money.  Patient is noted to be bradycardic and hypertensive but she does not have a headache to suggest this is a Cushing's response.  After Zofran became less bradycardic so perhaps there was some sort of vagal response happening there.  Discussed with the patient that her continuously elevated blood pressure even with IV intervention is worrisome she really not on a great deal of blood pressure medications at home that would suggest that she is normally this hypertensive she does have a mildly elevated troponin which could be from an NSTEMI or as she states she has a  history of Takotsubo's myocarditis and she has been under a great deal of stress lately.  Discussed with the patient that I would like to keep her overnight in the hospital to trend her troponins, get her blood pressure under much better control.  She and her daughter expressed understanding.  She taken a nap in the emergency department woken back up but that really did not help her blood pressure much.  I am very aware that the patient has significant bilateral carotid stenosis I am reticent to lower her blood pressure incredibly quickly because I do think that it could cause an ischemic event in her brain and we need to be very careful of that I discussed that with the patient and her daughter they did under stand.  [facility]  She has required multiple IV parenteral medications for her blood pressure.  Total critical care time exclusive of procedure time on this patient with life-threatening presentation and illness Admission disposition: 8/5/2024  6:19 PM             Requiring IV parenteral antihypertensives was 45 minutes with multiple reevaluations and critical decision making                           Medical Decision Making      Disposition and Plan     Clinical Impression:  1. Malignant hypertension    2. Elevated troponin    3. Bilateral carotid artery stenosis         Disposition:  Admit  8/5/2024  6:19 pm    Follow-up:  Peg Carrillo MD  [id number]  [facility] [address]  [phone]    Schedule an appointment as soon as possible for a visit in 1 week(s)      Jonah Graham MD  [address] [address]  [phone]    Follow up in 1 week(s)  Office will call you for follow up appt.          Medications Prescribed:  Discharge Medication List as of 8/6/2024  2:24 PM                            Hospital Problems       Present on Admission  Date Reviewed: 5/8/2024            ICD-10-CM Noted POA    * (Principal)  Malignant hypertension I10 8/5/2024 Unknown    Bilateral carotid artery stenosis I65.23 8/5/2024 Unknown    Elevated troponin R79.89 8/5/2024 Unknown    Hyperglycemia R73.9 8/5/2024 Yes    Hypertensive urgency I16.0 8/5/2024 Unknown    Troponin level elevated R79.89 8/5/2024 Unknown

## 2024-08-05 NOTE — H&P
[facility]     Georgia Tabor Patient Status:  Emergency    1940 MRN [id number]   [facility] EMERGENCY DEPARTMENT Attending Harmony Guerrero MD   Hosp Day # 0 PCP Peg Carrillo MD     Chief Complaint: Elevated blood pressure    Subjective:    History of Present Illness:     Georgia Tabor is a 84 year old female with past medical history of hypertension, hypothyroidism, hyperlipidemia, Takotsubo's myocarditis, breast cancer s/p lumpectomy and radiation, colon cancer who presents to the ED for elevated blood pressure.  Patient has not been feeling well.  She has been feeling nauseous and has not been sleeping well.  She wished to save money last night and turned off her air conditioning.  Patient was hot throughout the night.  Patient's daughter took her blood pressure today and saw that it was in the 200s/100s range.  Patient is around blood pressure this high in the past and generally has an SBP in the 110s when she gets it checked at doctor's offices.  She feels similar to how she felt when she had Takotsubo's myocarditis many years ago.    History/Other:    Past Medical History:  Past Medical History:    Acute bronchitis    Acute maxillary sinusitis    Anxiety state, unspecified    Arthritis    Breast CA (HCC)    Calculus of kidney    Cancer (HCC)    Cancer, colon (HCC)    Exposure to unspecified radiation    2008/breast    Fatigue    HIGH BLOOD PRESSURE    HIGH CHOLESTEROL    Hyperparathyroidism (HCC)    Hypothyroid    Malignant neoplasm of breast (female), unspecified site    Myocardial infarction (HCC)    Other and unspecified hyperlipidemia    Pain in joint, lower leg    Unspecified disorder of thyroid    Unspecified essential hypertension    Valvular disease     Past Surgical History:   Past Surgical History:   Procedure Laterality Date    Breast surgery procedure unlisted      lumpectomy, right    Cataract      Cataract surgery, complex Bilateral  2021    Cholecystectomy  1974    Colon surgery      removal od cancerous lesion    Colonoscopy      Hysterectomy      not bso    Lumpectomy right  2008    Invasive and DCIS    Chanel biopsy stereo nodule 1 site right (cpt=19081)      DCIS    Nm parathyroid surgery  (cpt=78070)            Other  2006    lithotripsy    Other      parathyroid resection    Other surgical history N/A 2015    Procedure: ANTERIOR POSTERIOR REPAIR;  Surgeon: All Armenta MD;  Location: [facility] MAIN OR    Radiation right        Family History:   Family History   Problem Relation Age of Onset    Heart Disorder Father     Other (acute MI) Father     Heart Disease Father     Other (amyotrophic lateral sclerosis) Mother     Other (ALS) Mother     Other (CABG) Sister     Diabetes Sister     Cancer Sister     Obesity Sister     Heart Disease Sister     Heart Disease Sister     Breast Cancer Self 68    Heart Disorder Brother      Social History:    reports that she has never smoked. She has never used smokeless tobacco. She reports current alcohol use. She reports that she does not use drugs.     Allergies:   Allergies   Allergen Reactions    Prochlorperazine CONFUSION     confusion  TABS; acting goofy      Adhesive Tape OTHER (SEE COMMENTS)     Skin ripped    Compazine     Compazine      TABS; acting goofy         Medications:    No current facility-administered medications on file prior to encounter.     Current Outpatient Medications on File Prior to Encounter   Medication Sig Dispense Refill    LEVOTHYROXINE 50 MCG Oral Tab Take 1 tablet (50 mcg total) by mouth daily 90 tablet 3    gabapentin 100 MG Oral Cap 2 cap PO BID x1 week, then 3 cap PO  capsule 0    spironolactone 25 MG Oral Tab Take 0.5 tablets (12.5 mg total) by mouth daily. 15 tablet 2    mupirocin 2 % External Cream Apply 1 Application topically 2 (two) times daily as needed. 1 each 1    lidocaine 5 % External Patch Place 1 patch onto the skin  daily as needed.      sertraline 25 MG Oral Tab Take 0.5 tablets (12.5 mg total) by mouth daily.      busPIRone 5 MG Oral Tab Take 1 tablet (5 mg total) by mouth 2 (two) times daily. 60 tablet 11    losartan Potassium 50 MG Oral Tab Take 0.5 tablets (25 mg total) by mouth daily.  0    carvedilol 6.25 MG Oral Tab Take 1 tablet (6.25 mg total) by mouth 2 (two) times daily. 60 tablet 3    aspirin EC 81 MG Oral Tab EC Take 1 tablet (81 mg total) by mouth every evening.      Vitamin D3 25 MCG (1000 UT) Oral Tab Take 1 tablet (1,000 Units total) by mouth daily.      simvastatin 20 MG Oral Tab Take 1 tablet (20 mg total) by mouth nightly. Take with 40 mg tablet to total 60 mg nightly.      simvastatin 40 MG Oral Tab Take 1 tablet (40 mg total) by mouth nightly. Take with 20 mg tablet to total 60 mg nightly.      Calcium Carbonate-Vitamin D 600-125 MG-UNIT Oral Tab Take 1 tablet by mouth daily.        Coenzyme Q10 (COQ10) 100 MG Oral Cap Take 1 tablet by mouth daily.        Glucosamine-Fish Oil-EPA-DHA (GLUCOSAMINE & FISH OIL OR) Take 1 capsule by mouth daily.        vitamin E 400 UNITS Oral Cap Take 1 capsule (400 Units total) by mouth daily.         Review of Systems:   A comprehensive review of systems was completed.    Pertinent positives and negatives noted in the HPI.    Objective:   Physical Exam:    BP (!) 162/85   Pulse 57   Temp 98.3 °F (36.8 °C) (Temporal)   Resp 21   Ht 5' 2\" (1.575 m)   Wt 174 lb (78.9 kg)   LMP 04/25/1985   SpO2 94%   BMI 31.83 kg/m²   General: No acute distress, Alert  Respiratory: No rhonchi, no wheezes  Cardiovascular: S1, S2. Regular rate and rhythm  Abdomen: Soft, Non-tender, non-distended, positive bowel sounds  Neuro: No new focal deficits  Extremities: No edema    Results:    Labs:      Labs Last 24 Hours:    Recent Labs   Lab 08/05/24  1443   RBC 4.46   HGB 13.5   HCT 40.9   MCV 91.7   MCH 30.3   MCHC 33.0   RDW 12.4   NEPRELIM 5.20   WBC 6.8   .0       Recent Labs    Lab 08/05/24  1443   *   BUN 10   CREATSERUM 0.86   EGFRCR 67   CA 10.0   ALB 4.7      K 4.2      CO2 27.0   ALKPHO 92   AST 23   ALT 14   BILT 1.1   TP 7.2       Lab Results   Component Value Date    INR 1.04 10/05/2020       Recent Labs   Lab 08/05/24  1443   TROPHS 65*       No results for input(s): \"TROP\", \"PBNP\" in the last 168 hours.    No results for input(s): \"PCT\" in the last 168 hours.    Imaging: Imaging data reviewed in Epic.    Assessment & Plan:      #Hypertensive urgency  #History of Takotsubo's myocarditis  #History of bilateral carotid stenosis  -BP in 200s/100s range initially but improved to 160s/80s  -Given enalaprilat in ER  -Bilateral carotid ultrasound on 7/5 showed bilateral carotid stenosis of 50 to 70%. Recommended for CTA carotids  -CTA carotids ordered   -Echo ordered  -PTA carvedilol, losartan  -Cardiology consulted    #Troponin elevation  -EKG reviewed  -Continue to trend    #Hypothyroidism  -Levothyroxine    #Hyperlipidemia  -Statin      Plan of care discussed with patient    Jill Khan DO    Supplementary Documentation:     The 21st Century Cures Act makes medical notes like these available to patients in the interest of transparency. Please be advised this is a medical document. Medical documents are intended to carry relevant information, facts as evident, and the clinical opinion of the practitioner. The medical note is intended as peer to peer communication and may appear blunt or direct. It is written in medical language and may contain abbreviations or verbiage that are unfamiliar.

## 2024-08-06 ENCOUNTER — APPOINTMENT (OUTPATIENT)
Dept: CV DIAGNOSTICS | Facility: HOSPITAL | Age: 84
End: 2024-08-06
Attending: STUDENT IN AN ORGANIZED HEALTH CARE EDUCATION/TRAINING PROGRAM
Payer: MEDICARE

## 2024-08-06 VITALS
SYSTOLIC BLOOD PRESSURE: 101 MMHG | RESPIRATION RATE: 18 BRPM | HEIGHT: 62 IN | DIASTOLIC BLOOD PRESSURE: 57 MMHG | OXYGEN SATURATION: 90 % | HEART RATE: 73 BPM | WEIGHT: 178.81 LBS | BODY MASS INDEX: 32.91 KG/M2 | TEMPERATURE: 98 F

## 2024-08-06 LAB
ALBUMIN SERPL-MCNC: 4 G/DL (ref 3.2–4.8)
ALBUMIN/GLOB SERPL: 1.7 {RATIO} (ref 1–2)
ALP LIVER SERPL-CCNC: 79 U/L
ALT SERPL-CCNC: 12 U/L
ANION GAP SERPL CALC-SCNC: 6 MMOL/L (ref 0–18)
AST SERPL-CCNC: 19 U/L (ref ?–34)
BASOPHILS # BLD AUTO: 0.02 X10(3) UL (ref 0–0.2)
BASOPHILS NFR BLD AUTO: 0.3 %
BILIRUB SERPL-MCNC: 0.9 MG/DL (ref 0.2–1.1)
BUN BLD-MCNC: 9 MG/DL (ref 9–23)
CALCIUM BLD-MCNC: 9.1 MG/DL (ref 8.7–10.4)
CHLORIDE SERPL-SCNC: 108 MMOL/L (ref 98–112)
CO2 SERPL-SCNC: 24 MMOL/L (ref 21–32)
CREAT BLD-MCNC: 0.85 MG/DL
EGFRCR SERPLBLD CKD-EPI 2021: 68 ML/MIN/1.73M2 (ref 60–?)
EOSINOPHIL # BLD AUTO: 0.03 X10(3) UL (ref 0–0.7)
EOSINOPHIL NFR BLD AUTO: 0.5 %
ERYTHROCYTE [DISTWIDTH] IN BLOOD BY AUTOMATED COUNT: 12.5 %
GLOBULIN PLAS-MCNC: 2.4 G/DL (ref 2–3.5)
GLUCOSE BLD-MCNC: 122 MG/DL (ref 70–99)
HCT VFR BLD AUTO: 39.4 %
HGB BLD-MCNC: 12.6 G/DL
IMM GRANULOCYTES # BLD AUTO: 0.02 X10(3) UL (ref 0–1)
IMM GRANULOCYTES NFR BLD: 0.3 %
LYMPHOCYTES # BLD AUTO: 1.39 X10(3) UL (ref 1–4)
LYMPHOCYTES NFR BLD AUTO: 22.9 %
MCH RBC QN AUTO: 30.4 PG (ref 26–34)
MCHC RBC AUTO-ENTMCNC: 32 G/DL (ref 31–37)
MCV RBC AUTO: 94.9 FL
MONOCYTES # BLD AUTO: 0.7 X10(3) UL (ref 0.1–1)
MONOCYTES NFR BLD AUTO: 11.5 %
NEUTROPHILS # BLD AUTO: 3.91 X10 (3) UL (ref 1.5–7.7)
NEUTROPHILS # BLD AUTO: 3.91 X10(3) UL (ref 1.5–7.7)
NEUTROPHILS NFR BLD AUTO: 64.5 %
OSMOLALITY SERPL CALC.SUM OF ELEC: 286 MOSM/KG (ref 275–295)
PLATELET # BLD AUTO: 211 10(3)UL (ref 150–450)
POTASSIUM SERPL-SCNC: 4.1 MMOL/L (ref 3.5–5.1)
PROT SERPL-MCNC: 6.4 G/DL (ref 5.7–8.2)
RBC # BLD AUTO: 4.15 X10(6)UL
SODIUM SERPL-SCNC: 138 MMOL/L (ref 136–145)
TROPONIN I SERPL HS-MCNC: 418 NG/L
TROPONIN I SERPL HS-MCNC: 479 NG/L
WBC # BLD AUTO: 6.1 X10(3) UL (ref 4–11)

## 2024-08-06 PROCEDURE — 99239 HOSP IP/OBS DSCHRG MGMT >30: CPT | Performed by: HOSPITALIST

## 2024-08-06 PROCEDURE — 93306 TTE W/DOPPLER COMPLETE: CPT | Performed by: STUDENT IN AN ORGANIZED HEALTH CARE EDUCATION/TRAINING PROGRAM

## 2024-08-06 RX ORDER — SPIRONOLACTONE 25 MG/1
12.5 TABLET ORAL DAILY
Qty: 15 TABLET | Refills: 0 | Status: SHIPPED | OUTPATIENT
Start: 2024-08-06

## 2024-08-06 NOTE — DISCHARGE PLANNING
NURSING DISCHARGE NOTE    Discharged Home via Wheelchair.  Accompanied by Support staff  Belongings Taken by patient/family.    Patient discharged home after being cleared by hospitalist and cardiology. AVS reviewed; questions addressed. Discharged @ 2551.

## 2024-08-06 NOTE — CONSULTS
Cardiology Consultation      Georgia Tabor Patient Status:  Inpatient    1940 MRN [id number]   [facility] 2NE-A Attending Kerrie Chapa,    Hosp Day # 1 PCP Peg Carrillo MD     Reason for Consultation:  Htn emergency     History of Present Illness:  Georgia Tabor is a(n) 84 year old female with chronic medical conditions including htn, hld, hx of takatsubo, non-obstructive cad who presents with complaint of just not feeling well and not like herself. States she had turned off the AC so it was very hot and humid overnight. This prevented her from sleeping. This was followed by nausea and dry heaving. She denies chest pain, palpitations, shortness of breath, abdominal pain, fevers, chills. Noted to have BP in the 200 systolic range on arrival to ER. States she has been compliant with her meds. Cardiology asked to evaluate.     History:  Past Medical History:    Acute bronchitis    Acute maxillary sinusitis    Anxiety state, unspecified    Arthritis    Back problem    Breast CA (HCC)    Calculus of kidney    Cancer (HCC)    Cancer, colon (HCC)    Exposure to unspecified radiation    2008/breast    Fatigue    HIGH BLOOD PRESSURE    HIGH CHOLESTEROL    Hyperparathyroidism (HCC)    Hypothyroid    Malignant neoplasm of breast (female), unspecified site    Myocardial infarction (HCC)    Other and unspecified hyperlipidemia    Pain in joint, lower leg    Unspecified disorder of thyroid    Unspecified essential hypertension    Valvular disease     Past Surgical History:   Procedure Laterality Date    Breast surgery procedure unlisted      lumpectomy, right    Cataract      Cataract surgery, complex Bilateral 2021    Cholecystectomy  1974    Colon surgery      removal od cancerous lesion    Colonoscopy      Hysterectomy      not bso    Lumpectomy right      Invasive and DCIS    Chanel biopsy stereo nodule 1 site right (cpt=19081)  2007    DCIS    Nm parathyroid surgery  (cpt=78070)             Other  2006    lithotripsy    Other      parathyroid resection    Other surgical history N/A 2015    Procedure: ANTERIOR POSTERIOR REPAIR;  Surgeon: All Armenta MD;  Location: [facility] MAIN OR    Radiation right      Thyroidectomy       Family History   Problem Relation Age of Onset    Heart Disorder Father     Other (acute MI) Father     Heart Disease Father     Other (amyotrophic lateral sclerosis) Mother     Other (ALS) Mother     Other (CABG) Sister     Diabetes Sister     Cancer Sister     Obesity Sister     Heart Disease Sister     Heart Disease Sister     Breast Cancer Self 68    Heart Disorder Brother       reports that she has never smoked. She has never used smokeless tobacco. She reports current alcohol use. She reports that she does not use drugs.    Allergies:  Allergies   Allergen Reactions    Prochlorperazine CONFUSION     confusion  TABS; acting goofy      Adhesive Tape OTHER (SEE COMMENTS)     Skin ripped    Compazine     Compazine      TABS; acting goofy         Medications:    Current Facility-Administered Medications:     sodium chloride 0.9% infusion, 125 mL/hr, Intravenous, Continuous    aspirin DR tab 81 mg, 81 mg, Oral, QPM    busPIRone (Buspar) tab 5 mg, 5 mg, Oral, BID    carvedilol (Coreg) tab 6.25 mg, 6.25 mg, Oral, 2x Daily(Beta Blocker)    levothyroxine (Synthroid) tab 50 mcg, 50 mcg, Oral, Before breakfast    losartan (Cozaar) tab 25 mg, 25 mg, Oral, Daily    atorvastatin (Lipitor) tab 30 mg, 30 mg, Oral, Nightly    sertraline (Zoloft) tab 12.5 mg, 12.5 mg, Oral, Nightly    spironolactone (Aldactone) partial tab 12.5 mg, 12.5 mg, Oral, Daily    gabapentin (Neurontin) cap 300 mg, 300 mg, Oral, BID    acetaminophen (Tylenol Extra Strength) tab 500 mg, 500 mg, Oral, Q4H PRN    melatonin tab 3 mg, 3 mg, Oral, Nightly PRN    guaiFENesin ER (Mucinex) 12 hr tab 600 mg, 600 mg, Oral, BID PRN    benzonatate (Tessalon) cap 200 mg, 200 mg, Oral, TID PRN     glycerin-hypromellose- (Artificial Tears) 0.2-0.2-1 % ophthalmic solution 1 drop, 1 drop, Both Eyes, QID PRN    sodium chloride (Saline Mist) 0.65 % nasal solution 1 spray, 1 spray, Each Nare, Q3H PRN    enoxaparin (Lovenox) 40 MG/0.4ML SUBQ injection 40 mg, 40 mg, Subcutaneous, Daily    ondansetron (Zofran) 4 MG/2ML injection 4 mg, 4 mg, Intravenous, Q6H PRN    metoclopramide (Reglan) 5 mg/mL injection 5 mg, 5 mg, Intravenous, Q8H PRN    polyethylene glycol (PEG 3350) (Miralax) 17 g oral packet 17 g, 17 g, Oral, Daily PRN    sennosides (Senokot) tab 17.2 mg, 17.2 mg, Oral, Nightly PRN    bisacodyl (Dulcolax) 10 MG rectal suppository 10 mg, 10 mg, Rectal, Daily PRN    fleet enema (Fleet) 7-19 GM/118ML rectal enema 133 mL, 1 enema, Rectal, Once PRN    labetalol (Trandate) 5 mg/mL injection 10 mg, 10 mg, Intravenous, Q4H PRN    hydrALAzine (Apresoline) 20 mg/mL injection 5 mg, 5 mg, Intravenous, Q4H PRN    sertraline (Zoloft) tab 12.5 mg, 12.5 mg, Oral, Once    Review of Systems:  A comprehensive review of systems was negative if not otherwise mention in above HPI.    /61 (BP Location: Left arm)   Pulse 69   Temp 98.3 °F (36.8 °C) (Oral)   Resp 18   Ht 5' 2\" (1.575 m)   Wt 178 lb 12.7 oz (81.1 kg)   LMP 1985   SpO2 (!) 89%   BMI 32.70 kg/m²   Temp (24hrs), Av.3 °F (36.8 °C), Min:98.3 °F (36.8 °C), Max:98.3 °F (36.8 °C)     No intake or output data in the 24 hours ending 24 0821  Wt Readings from Last 3 Encounters:   24 178 lb 12.7 oz (81.1 kg)   24 174 lb (78.9 kg)   24 174 lb (78.9 kg)       Physical Exam:   General: Alert and oriented x 3. No apparent distress. No respiratory or constitutional distress.  HEENT: Normocephalic, anicteric sclera, neck supple.  Neck: No JVD  Cardiac: Regular rate and rhythm. S1, S2 normal. No murmur, pericardial rub, S3.  Lungs: Clear without wheezes, rales, rhonchi or dullness.  Normal excursions and effort.  Abdomen: Soft,  non-tender. BS-present.  Extremities: Without clubbing, cyanosis or edema.    Neurologic: Alert and oriented, normal affect.  Skin: Warm and dry.     Laboratory Data:  Lab Results   Component Value Date    WBC 6.1 08/06/2024    HGB 12.6 08/06/2024    HCT 39.4 08/06/2024    .0 08/06/2024    CREATSERUM 0.85 08/06/2024    BUN 9 08/06/2024     08/06/2024    K 4.1 08/06/2024     08/06/2024    CO2 24.0 08/06/2024     08/06/2024    CA 9.1 08/06/2024    ALB 4.0 08/06/2024    ALKPHO 79 08/06/2024    BILT 0.9 08/06/2024    TP 6.4 08/06/2024    AST 19 08/06/2024    ALT 12 08/06/2024       Imaging/results:  EKG - NSR. PAC's.         Coronary angiogram 2/10/2021  Left Ventriculography and hemodynamics:   LV EF 30-35%, there is mid and apical hypokinesis suspicious for   Takotsubo cardiomyopathy, 1-2+ MR  LV EDP 20  No gradient across aortic valve  Week event monitor accompanied monitor from the [employer]  Coronary Angiography  RCA:  Dominant, there is anterior takeoff, proximal 10 to 20% stenosis, mid mild irregularities, distal mild irregularities.    ADELINA-3 flow distally.  PDA mild irregularities, VALDO 10 to 20% she is in stenosis.  Left main:  Patent, free of obstructive disease  Left anterior descending: Ostial 10 to 20% stenosis, proximal   mild irregularities, mid mild irregularities, distal 20 to 30% right stenosis, D1 - 20 to 30% stenosis  circumflex: Proximal 10 to 20% stenosis, mid mild irregularities, distal mild irregularities.  ADELINA-3 flow distally, OM1 -mild irregularities  Assessment:  - Non occlusive CAD  - Mid and apical hypokinesis suggestive of Takotsubo Cardiomyopathy        Assessment:  Hypertensive emergency   Elevated high-sensitivity troponin - suspect 2/2 supply/demand mismatch 2/2 above   Hx stress induced cardiomyopathy  Non-obstructive CAD - noted [facility] 2021  HTN  HLD       Plan:  CTA carotids completed - no high-grade stenosis noted  ECHO   Trend trops until peak   BP improved -  cont coreg 6.25 mg bid, losartan 25 mg every day, aldactone 12.5 mg every day   Further recs to follow         Thank you for allowing me to participate in the care of your patient.      Kody Khan DO  Cardiologist  [facility]  8/6/2024 8:21 AM      Note to the patient: The 21st Century Cures Act makes medical notes like these available to patients in the interest of transparency. However, be advised that this is a medical document. It is intended as peer to peer communication. It is written in medical language and may contain abbreviations or verbiage that are unfamiliar. It may appear blunt or direct. Medical documents are intended to carry relevant information, facts as evident, and clinical opinion of the practitioner.     Disclaimer: Components of this note were documented using voice recognition system and are subject to errors not corrected at proofreading. Contact the author of this note for any clarifications.

## 2024-08-06 NOTE — PROGRESS NOTES
08/05/24 2109 08/05/24 2111 08/05/24 2115   Vital Signs   /82 154/84 153/89   MAP (mmHg) 100 (!) 104 (!) 107   BP Location Left arm Left arm Left arm   BP Method Automatic Automatic Automatic   Patient Position Lying Sitting Standing

## 2024-08-06 NOTE — CM/SW NOTE
Pt discussed in rounds and chart was reviewed. No anticipated DC needs identified at this time. Will monitor for any changes in needs.      to remain available for support and/or discharge planning.    SHIRLEY Matias  Discharge Planner  [phone]

## 2024-08-06 NOTE — HISTORICAL OFFICE NOTE
Facility Logo [facility]  [address] [place] [address], 4th floor, [place] [address]  [phone]      Georgia Tabor  Progress Note  Demographics:  Name: Georgia Tabor YOB: 1940  Age: 83, Female Medical Record No: [id number]  Visited Date/Time: 11/06/2023 10:50 AM    Chief Complaints  6 month follow up   CMP/FLP- 10/30   History of Present Illness  No chest pain, dyspnea, orthopnea, PND or increased lower extremity pain/swelling (chronic left leg swelling).  Palpitations have settled down with treatment for anxiety.  no presyncope or syncope.  No fever, nausea/vomiting, diarrhea or urinary complaints.    She has not been checking BP regularly.    She is on anti-anxiety by PCP and neurologist.    Limited in exercise due to sciatic pain recently and will be following up pain management.  Cardiac risk factors Hypertension, Dyslipidemia and Never smoked  Past Medical History  1.Takotsubo cardiomyopathy  2.Asymptomatic carotid artery stenosis, bilateral  3.Nonrheumatic aortic (valve) insufficiency  4.Pure hypercholesterolemia  5.Hypertension, benign  6.Localized edema  7.Family history of coronary arteriosclerosis  8.Non-ST elevation MI (NSTEMI)  Family History  1. Father - Old MI (myocardial infarction) (Reason for death)  2. Sister - Family history of early CAD - FHx  Social History  Smoking status Never smoked  Tobacco usage - No (Non-smoker (finding))  Review of systems  Cardiovascular No history of Chest pain, VALDOVINOS, Palpitations, Syncope, PND, Orthopnea, Edema and Claudication  Respiratory No history of SOB  Physical Examination  Vitals Left Arm Sitting  / 72 mmHg, Pulse rate 65 bpm, Height in 5' 2\", BMI: 32.4, Weight in 177 lbs (or) 80.29 kgs and BSA : 1.91 cc/m²  General Appearance No Acute Distress and Appropriate  Head/Eyes/Ears/Nose/Mouth/Throat Conjunctiva pink, Sclera Clear and Mucous membranes Moist  Neck Normal carotid pulsations, No carotid bruits and No  JVD  Respiratory Unlabored, Lungs clear with normal breath sounds and Equal bilaterally  Cardiovascular Intact distal pulses and Regular rhythm. Normal rate present. Normal and normal S1 and S2    Gastrointestinal Abdomen soft and Non-tender  Gait Walks with a cane  Lower Extremities 1-2+ LLE and 1+ RLE  Skin Warm and dry and Intact  Neurologic / Psychiatric Alert and Oriented  Speech Normal speech  EKG/Other abnormalities  2D echo 5/21/2021 (personally reviewed and agree) Conclusions:  1. Left ventricle: Systolic function was normal. The estimated ejection fraction was 60-65%. No diagnostic evidence for regional wall motion abnormalities. Doppler parameters are consistent with abnormal left ventricular relaxation - grade 1 diastolic dysfunction.  2. Aortic valve: Mild regurgitation.  3. Left atrium: The left atrial volume was normal.  Impressions:  This study is compared with previous dated 02/10/2021: The left ventricular systolic function has normalized.    2D echo 2/10/21: Conclusions:  1. Left ventricle: The cavity size was normal. Wall thickness was mildly increased. Systolic function was mildly to moderately reduced. The estimated ejection fraction was 35-40%. The study is not technically sufficient to allow evaluation of LV diastolic function.  2. Regional wall motion abnormality: Akinesis of the apical anterior, mid anteroseptal, mid inferoseptal, apical inferior, and apical septal myocardium.  3. Aortic valve: Trileaflet; mildly thickened leaflets. Mild regurgitation.  4. Aorta: Ascending aorta diameter was 3.8cm.  5. Mitral valve: Mildly calcified annulus. There was mild regurgitation.  6. Left atrium: The left atrial volume was moderately to markedly increased.  7. Pulmonary arteries: Systolic pressure was moderately increased, estimated to be 50mm Hg.  Impressions:  This study is compared with previous dated 10/06/2020: Significant decline in ejection fraction compared to prior, apical and mid  wall  motion abnormalities as noted above.     CT gated coronaries: 2019 IMPRESSION:  1.  Normal coronary arteries within limitations of the coronary CT angiography technique with coronary calcium score of 6 AU.  2.  Normal aorta and pericardium within limitations of CT technique.     2020 carotids bilateral 16-49% stenoses.   [facility] US carotid 22 with 1-39% b/l ICA stenoses.       abdominal aorta was normal.       Procedures:  Coronary angiogram 2/10/2021  Left Ventriculography and hemodynamics:   LV EF 30-35%, there is mid and apical hypokinesis suspicious for   Takotsubo cardiomyopathy, 1-2+ MR  LV EDP 20  No gradient across aortic valve  Week event monitor accompanied monitor from the company  Coronary Angiography  RCA:  Dominant, there is anterior takeoff, proximal 10 to 20% stenosis, mid mild irregularities, distal mild irregularities.    ADELINA-3 flow distally.  PDA mild irregularities, VALDO 10 to 20% she is in stenosis.  Left main:  Patent, free of obstructive disease  Left anterior descending: Ostial 10 to 20% stenosis, proximal   mild irregularities, mid mild irregularities, distal 20 to 30% right stenosis, D1 - 20 to 30% stenosis  circumflex: Proximal 10 to 20% stenosis, mid mild irregularities, distal mild irregularities.  ADELINA-3 flow distally, OM1 -mild irregularities  Assessment:  - Non occlusive CAD  - Mid and apical hypokinesis suggestive of Takotsubo Cardiomyopathy    [facility] labs 10/30/23 13:11  Glucose: 136 (H) Sodium: 141 Potassium: 4.5  BUN: 12 CREATININE: 1.22 (H)  AST (SGOT): 22 ALT (SGPT): 22  Cholesterol, Total: 165 Triglycerides: 125 HDL Cholesterol: 51 LDL Cholesterol Ca    [facility] labs 23 12:58  Glucose: 144 (H) Sodium: 140 Potassium: 4.3  BUN: 12 CREATININE: 0.75  AST (SGOT): 19 ALT (SGPT): 19  Cholesterol, Total: 182 Triglycerides: 127 HDL Cholesterol: 58 LDL Cholesterol Ca (H)    Results for EUGENIE PÉREZ (MRN [id number]) 3/4/2022 11:17  Glucose: 109 (H)  Sodium: 140 Potassium: 4.5  BUN: 16 CREATININE: 0.83  AST (SGOT): 17 ALT (SGPT): 29  Cholesterol, Total: 180 Triglycerides: 95 HDL Cholesterol: 79 (H) LDL Cholesterol Ca  TSH: 1.030  WBC: 5.9 Hemoglobin: 12.7 Hematocrit: 41.1 Platelet Count: 219.0  Allergies  1.Compazine - Drug Class(Reaction:altered mental status, Severity:Severe)  Medications  1.ALPRAZolam (XANAX) 0.25 MG tablet, as needed.  2.aspirin 81 MG tablet, Take by mouth daily. 1 by mouth daily  3.busPIRone (BUSPAR) 5 MG tablet, TAKE ONE TABLET BY MOUTH TWICE DAILY  4.Calcium Carb-Cholecalciferol (CALCIUM + D3) 600-200 MG-UNIT Tab, 1 by mouth dialy  5.Carvedilol 6.25 Mg Tab Cleopatra, Take 1 tablet by mouth 2 times a day  6.cholecalciferol (cholecalciferol) 25 mcg (1,000 units) tablet, Take by mouth daily. Take two tablets daily for a total of 2000mcg daily  7.coenzyme Q10 100 MG capsule, Take 1 tablet by mouth.  8.levothyroxine 50 MCG tablet, TAKE ONE TABLET BY MOUTH ONE TIME DAILY  9.Losartan Potassium 50 Mg Tab Camb, Take one-half tablet orally once a day.  10.Simvastatin 20 Mg Tab Nort, Take 1 tablet by mouth once a day  11.Simvastatin 40 Mg Tab Nort, Take 1 tablet orally once a day.  12.Spironolactone 25 Mg Tab Nort, Take one-half tablet orally once a day.  13.turmeric 400 mg capsule, Take 1 capsule orally once a day.  14.Vitamin E 400 units Tab, 1 by mouth daily  15.Zoloft 25 mg tablet, Take 1 tablet orally once a day at night.  Impression  1.Takotsubo cardiomyopathy  2.Asymptomatic carotid artery stenosis, bilateral  3.Nonrheumatic aortic (valve) insufficiency  4.Pure hypercholesterolemia  5.Hypertension, benign  6.Localized edema  Assessment & Plan  82 yo significant cardiac history for hospitalization for chest pain she was diagnosed with NSTEMI coronary angiogram showed nonobstructive CAD apical hypokinesis concerning for Takotsubo cardiomyopathy.  EF was depressed per echocardiogram at 35 to 40% but recovered. She reports multiple stressors  since losing her job in 2019.     Suggest f/u US carotid in April 2024.    Knee surgery on hold due to back and sciatic pain.  If/when surgery contemplated just would need to check echo since no CAD on gated CT Nov 2019.    Plan:  -refer to lymphedema clinic in the past (has not gone) but discuss PCP  -US carotid, CMP and lipid profile just prior to follow-up with Dr. Graham in 6 months  -Continue heart healthy diet and maintain good exercise capacity  -Continue aspirin 81 mg daily, statin and anti-hypertensive medications  -Monitor blood pressure and call if systolic blood pressure greater than 130 mmHg consistently and/or diastolic blood pressure greater than 85 mmHg. DASH diet and blood pressure monitoring information given.    Recommend low sodium diet, daily exercise and maintain a normal BMI. Recommend monitor blood pressure at home.  Labs and Diagnostics ordered  1.Carotid Ultrasound (bilateral) (6 Months)  2.CMP (6 Months)  3.Lipid panel, Fasting (6 Months)  Future appointments  1.Referral Visit - Peg Carrillo ([email or web address]) : (Today)  2.Follow up visit - Jonah Graham (6 Months)  Miscellaneous  1.Reviewed carotid ultrasound with the patient.  2.Weight monitoring (regime/therapy)  Nurses documentation  Refills: none  Upcoming surgeries: none  Use of assistive device: none  EKG: no  (KEI BRENNER)  Patient instructions  Plan:  -refer to lymphedema clinic [phone]) in the past (has not gone) but discuss PCP  -US carotid, CMP and lipid profile just prior to follow-up with Dr. Graham in 6 months  -Continue heart healthy diet and maintain good exercise capacity  -Continue aspirin 81 mg daily, statin and anti-hypertensive medications  -Monitor blood pressure and call if systolic blood pressure greater than 130 mmHg consistently and/or diastolic blood pressure greater than 85 mmHg. DASH diet and blood pressure monitoring information given.    Carotid ultrasound:   Your provider has ordered an  ultrasound of the carotid arteries of your neck. This test will check to see if there are blockages in arteries of your neck leading up to the brain.  No special preparations are necessary; please no sweaters or turtle necks. This test takes approximately 20 to 30 minutes.     Lab Details  COMP METABOLIC PANEL (14)  05/01/2023 06:17:12 PM  GLUCOSE 144 70-99 mg/dL H F  SODIUM 140 136-145 mmol/L  F  POTASSIUM 4.3 3.5-5.1 mmol/L  F  CHLORIDE 111  mmol/L  F  CO2 28.0 21.0-32.0 mmol/L  F  ANION GAP 1 0-18 mmol/L  F  BUN 12 7-18 mg/dL  F  CREATININE 0.75 0.55-1.02 mg/dL  F  CALCIUM 9.4 8.5-10.1 mg/dL  F  OSMOLALITY CALCULATED 292 275-295 mOsm/kg  F  E GFR CR 79 >=60 mL/min/1.73m2  F  AST 19 15-37 U/L  F  ALT 19 13-56 U/L  F  ALKALINE PHOSPHATASE 87  U/L  F  BILIRUBIN, TOTAL 0.8 0.1-2.0 mg/dL  F  TOTAL PROTEIN 7.1 6.4-8.2 g/dL  F  ALBUMIN 3.9 3.4-5.0 g/dL  F  GLOBULIN 3.2 2.8-4.4 g/dL  F  A/G RATIO 1.2 1.0-2.0  F  FASTING PATIENT CMP ANSWER Yes   F  LIPID PANEL  05/01/2023 06:00:10 PM  CHOLESTEROL 182 <200 mg/dL  F  HDL CHOL 58 40-59 mg/dL  F  TRIGLYCERIDES 127  mg/dL  F  LDL CHOLESTROL 102 <100 mg/dL H F  VLDL 21 0-30 mg/dL  F  NON HDL CHOL 124 <130 mg/dL  F  FASTING PATIENT LIPID ANSWER Yes   F  Diagnostics Details  Carotid Ultrasound 04/26/2022  1.The study quality is average.    2.1-39% stenosis in the mid right internal carotid artery based on Bluth Criteria.    3.1-39% stenosis in the mid left internal carotid artery based on Bluth Criteria.    4.Antegrade right vertebral artery flow.    5.Antegrade left vertebral artery flow.    CPOE Orders carried out by: Tawana Mckenzie  Care Providers: Jonah Graham MD and Tawana Mckenzie  Electronically Authenticated by  Jonah Graham MD  11/06/2023 05:14:33 PM  Disclaimer: Components of this note were documented using voice recognition system and are subject to errors not corrected at proofreading. Contact the author of this note for any clarifications.

## 2024-08-06 NOTE — PLAN OF CARE
Assumed care of patient from the ED at 22:30, alert and oriented x4,   1st degree AV block with PAC's on tele,  Room air, right lungs clear, left lungs with mild crackles, no cough noted. Continuous pulse oximetry maintained. Denies shortness of breath and dizziness.   No complaints of pain.   Continent of bowel and bladder.   Upper and lower dentures present at bedside.  Safety precautions maintained    Discussed plan of care with patient. All questions answered.    Problem: Patient/Family Goals  Goal: Patient/Family Long Term Goal  Description: Patient's Long Term Goal: Go home    Interventions:  - routine lab draws  - medication compliance   - MD rounding  - See additional Care Plan goals for specific interventions  8/6/2024 0157 by Kem Solorio, RN  Outcome: Progressing  8/6/2024 0156 by Kem Solorio, RN  Note: Patient's Long Term Goal: Go home    Interventions:  - routine lab draws  - medication compliance   - MD rounding  - See additional Care Plan goals for specific interventions  Goal: Patient/Family Short Term Goal  Description: Patient's Short Term Goal: pain free    Interventions:   - ice pack  -pain medication  - See additional Care Plan goals for specific interventions  Outcome: Progressing     Problem: CARDIOVASCULAR - ADULT  Goal: Maintains optimal cardiac output and hemodynamic stability  Description: INTERVENTIONS:  - Monitor vital signs, rhythm, and trends  - Monitor for bleeding, hypotension and signs of decreased cardiac output  - Evaluate effectiveness of vasoactive medications to optimize hemodynamic stability  - Monitor arterial and/or venous puncture sites for bleeding and/or hematoma  - Assess quality of pulses, skin color and temperature  - Assess for signs of decreased coronary artery perfusion - ex. Angina  - Evaluate fluid balance, assess for edema, trend weights  Outcome: Progressing  Goal: Absence of cardiac arrhythmias or at baseline  Description: INTERVENTIONS:  - Continuous  cardiac monitoring, monitor vital signs, obtain 12 lead EKG if indicated  - Evaluate effectiveness of antiarrhythmic and heart rate control medications as ordered  - Initiate emergency measures for life threatening arrhythmias  - Monitor electrolytes and administer replacement therapy as ordered  Outcome: Progressing

## 2024-08-06 NOTE — DISCHARGE SUMMARY
[facility]     Georgia Tabor Patient Status:  Inpatient    1940 MRN [id number]   Location [facility] 2NE-A Attending No att. providers found   Hosp Day # 1 PCP Peg Carrillo MD     Date of Admission: 2024  Date of Discharge:  2024     Discharge Disposition: Home or Self Care    Discharge Diagnosis:  #Hypertensive urgency  #History of Takotsubo's myocarditis  #History of bilateral carotid stenosis  #dehydration  #heat stroke?  -BP in 200s/100s range initially but improved to 160s/80s  -Given enalaprilat in ER  -Bilateral carotid ultrasound on  showed bilateral carotid stenosis of 50 to 70%. Recommended for CTA carotids - negative here  -Echo EF 70-75%  -PTA carvedilol, losartan  -Cardiology consulted     #Troponin elevation  -EKG reviewed  -Continue to trend     #Hypothyroidism  -Levothyroxine     #Hyperlipidemia  -Statin    History of Present Illness: Georgia Tabor is a 84 year old female with past medical history of hypertension, hypothyroidism, hyperlipidemia, Takotsubo's myocarditis, breast cancer s/p lumpectomy and radiation, colon cancer who presents to the ED for elevated blood pressure.  Patient has not been feeling well.  She has been feeling nauseous and has not been sleeping well.  She wished to save money last night and turned off her air conditioning.  Patient was hot throughout the night.  Patient's daughter took her blood pressure today and saw that it was in the 200s/100s range.  Patient is around blood pressure this high in the past and generally has an SBP in the 110s when she gets it checked at doctor's offices.  She feels similar to how she felt when she had Takotsubo's myocarditis many years ago.     Brief Synopsis: pt w/ HTNsive urgency. Likely 2/2 dehydration due to heat stroke. Improved with just IVF. BP normalized. Echo and CTA carotids unremarkable. Pt back to baseline. Ok to DC home    Lace+ Score: 69  59-90 High Risk  29-58 Medium  Risk  0-28   Low Risk       TCM Follow-Up Recommendation:  LACE > 58: High Risk of readmission after discharge from the hospital.  **Certification    Admission date was 8/5/2024.  Inpatient stay was shorter than expected.  Patient's Malignant hypertension was initially serious enough to expect a more lengthy hospitalization but patient improved faster than expected.                 Procedures during hospitalization:   none    Incidental or significant findings and recommendations (brief descriptions):  none    Lab/Test results pending at Discharge:   none    Consultants:  cards    Discharge Medication List:     Discharge Medications        CONTINUE taking these medications        Instructions Prescription details   ALPRAZolam 0.25 MG Tabs  Commonly known as: Xanax      Take 1 tablet (0.25 mg total) by mouth nightly as needed for Anxiety.   Refills: 0     aspirin 81 MG Tbec      Take 1 tablet (81 mg total) by mouth every evening.   Refills: 0     busPIRone 5 MG Tabs  Commonly known as: Buspar      Take 1 tablet (5 mg total) by mouth 2 (two) times daily.   Quantity: 60 tablet  Refills: 11     Calcium Carbonate-Vitamin D 600-125 MG-UNIT Tabs      Take 1 tablet by mouth daily.   Refills: 0     carvedilol 6.25 MG Tabs  Commonly known as: Coreg      Take 1 tablet (6.25 mg total) by mouth 2 (two) times daily.   Quantity: 60 tablet  Refills: 3     cholecalciferol 25 MCG (1000 UT) Tabs  Commonly known as: Vitamin D3      Take 1 tablet (1,000 Units total) by mouth daily.   Refills: 0     CoQ10 100 MG Caps      Take 1 tablet by mouth daily.   Refills: 0     gabapentin 100 MG Caps  Commonly known as: Neurontin      2 cap PO BID x1 week, then 3 cap PO BID   Quantity: 360 capsule  Refills: 0     GLUCOSAMINE & FISH OIL OR      Take 1 capsule by mouth daily.   Refills: 0     levothyroxine 50 MCG Tabs  Commonly known as: Synthroid      Take 1 tablet (50 mcg total) by mouth daily   Quantity: 90 tablet  Refills: 3     lidocaine 5 %  Ptch  Commonly known as: Lidoderm      Place 1 patch onto the skin daily as needed.   Refills: 0     losartan 50 MG Tabs  Commonly known as: Cozaar      Take 0.5 tablets (25 mg total) by mouth daily.   Quantity:    Refills: 0     mupirocin 2 % Crea  Commonly known as: Bactroban      Apply 1 Application topically 2 (two) times daily as needed.   Quantity: 1 each  Refills: 1     sertraline 25 MG Tabs  Commonly known as: Zoloft      Take 0.5 tablets (12.5 mg total) by mouth daily.   Refills: 0     simvastatin 20 MG Tabs  Commonly known as: Zocor      Take 1 tablet (20 mg total) by mouth nightly. Take with 40 mg tablet to total 60 mg nightly.   Refills: 0     simvastatin 40 MG Tabs  Commonly known as: Zocor      Take 1 tablet (40 mg total) by mouth nightly. Take with 20 mg tablet to total 60 mg nightly.   Refills: 0     spironolactone 25 MG Tabs  Commonly known as: Aldactone      Take 1/2 tablet (12.5 mg total) by mouth daily   Quantity: 15 tablet  Refills: 0     vitamin E 400 UNITS Caps  Commonly known as: Alpha-E      Take 1 capsule (400 Units total) by mouth daily.   Refills: 0               Where to Get Your Medications        These medications were sent to [id number] - [address] [phone], [phone]  [address] [place] [address]      Phone: [phone]   spironolactone 25 MG Tabs         [place]    Follow-up appointment:   Peg Carrillo MD  [id number]  [facility] [id number] [phone]    Schedule an appointment as soon as possible for a visit in 1 week(s)      Jonah Graham MD  [address] [id number] [phone]    Follow up in 1 week(s)  Office will call you for follow up appt.    Appointments for Next 30 Days 8/6/2024 - 9/5/2024        Date Arrival Time Visit Type Length Department Provider     8/7/2024 11:20 AM  MYCHART FOLLOW UP SPECIALTY [6142] 20 min [facility], [address] [place] [address] Aston Gonzalez DO     Patient Instructions:     Contact your primary care provider if your insurance requires a referral.    Please arrive 15 minutes prior to your scheduled appointment. Be sure to bring your current Insurance card, Photo ID, and medication bottles or a list of your current medications.      A 24 hour notice is required to cancel any appointment or you may be charged a $40 No Show Fee.     Important: 24 hour notice is required to cancel any appointment or you may be charged a $40 No Show Fee. Please notify your physician office.         Location Instructions:     Masks are optional for all patients and visitors, unless otherwise indicated.                      Vital signs:  Temp:  [98 °F (36.7 °C)-98.3 °F (36.8 °C)] 98.2 °F (36.8 °C)  Pulse:  [57-79] 73  Resp:  [16-22] 18  BP: (101-202)/() 101/57  SpO2:  [89 %-94 %] 90 %    Physical Exam:    General: No acute distress   Lungs: clear to auscultation  Cardiovascular: S1, S2  Abdomen: Soft      -----------------------------------------------------------------------------------------------  PATIENT DISCHARGE INSTRUCTIONS: See electronic chart    Efra Mondragon MD    Total time spent on discharge plannin minutes     The  [employer] Cures Act makes medical notes like these available to patients in the interest of transparency. Please be advised this is a medical document. Medical documents are intended to carry relevant information, facts as evident, and the clinical opinion of the practitioner. The medical note is intended as peer to peer communication and may appear blunt or direct. It is written in medical language and may contain abbreviations or verbiage that are unfamiliar.

## 2024-08-06 NOTE — ED QUICK NOTES
Orders for admission, patient is aware of plan and ready to go upstairs. Any questions, please call ED RN jing at extension [phone].     Patient Covid vaccination status: Fully vaccinated     COVID Test Ordered in ED: SARS-CoV-2/Flu A and B/RSV by PCR (GeneXpert)    COVID Suspicion at Admission: N/A    Running Infusions:    sodium chloride 125 mL/hr (08/05/24 1902)        Mental Status/LOC at time of transport: aox4    Other pertinent information:   CIWA score: N/A   NIH score:  N/A

## 2024-08-06 NOTE — PROGRESS NOTES
Georgia Tabor Patient Status:  Inpatient    1940 MRN [id number]   Location [facility] 2NE-A Attending Kerrie Chapa, DO   Hosp Day # 0 PCP Peg Carrillo MD     Cardiology Nocturnal APN Note    Briefly: (Documentation from chart review)     Georgia Tabor is a 85 y/o female who presented with HTN urgency and elevated troponin and has a PMH/PSH of:       Past Medical History:    Acute bronchitis    Acute maxillary sinusitis    Anxiety state, unspecified    Arthritis    Back problem    Breast CA (HCC)    Calculus of kidney    Cancer (HCC)    Cancer, colon (HCC)    Exposure to unspecified radiation    2008/breast    Fatigue    HIGH BLOOD PRESSURE    HIGH CHOLESTEROL    Hyperparathyroidism (HCC)    Hypothyroid    Malignant neoplasm of breast (female), unspecified site    Myocardial infarction (HCC)    Other and unspecified hyperlipidemia    Pain in joint, lower leg    Unspecified disorder of thyroid    Unspecified essential hypertension    Valvular disease       Primary Cardiologist Ali    Vital Signs:       2024     9:11 PM 2024     9:15 PM   Vitals History   /84 153/89   BP Location Left arm Left arm   Pulse 79 76   SpO2 90 % 91 %        Labs:   Lab Results   Component Value Date    WBC 6.8 2024    HGB 13.5 2024    HCT 40.9 2024    .0 2024    CREATSERUM 0.86 2024    BUN 10 2024     2024    K 4.2 2024     2024    CO2 27.0 2024     2024    CA 10.0 2024    ALB 4.7 2024    ALKPHO 92 2024    BILT 1.1 2024    TP 7.2 2024    AST 23 2024    ALT 14 2024    TROPHS 460 2024       Diagnostics:   CTA CAROTID ARTERIES (CPT=70498)    Result Date: 2024  PROCEDURE:  CTA CAROTID ARTERIES (CPT=70498)  COMPARISON:  [facility] , CT, CTA GATED CORONARY ARTERIES W CALCIUM SCORING (CPT=75574), 10/29/2020, 1:40 PM.  INDICATIONS:  not feeling well, elevated blood  pressure, hx of carotid artery stenosis  TECHNIQUE:  Contrast-enhanced multislice CT angiography of the neck vasculature from AP window to Sella was performed using nonionic contrast. 3D volume rendering images were obtained by the technologist as well as the radiologist on an independent workstation. Multiplanar 3D reformatted imaging including multiplanar MIP images were obtained. Curved planar reformats were performed through the carotid and vertebral arteries. All measurements obtained in this exam were performed using NASCET criteria. Dose reduction techniques were used. Dose information is transmitted to the ACR (American College of Radiology) NRDR (National Radiology Data Registry) which includes the Dose Index Registry.   PATIENT STATED HISTORY:(As transcribed by Technologist)  Patient not feeling well, high blood pressure, hx of carotid artery stenosis   CONTRAST USED:  75cc of Isovue 370  FINDINGS:   Contrast injection by way of the left arm.  There is extensive enhancing venous reflux into the bilateral neck, supraclavicular region and chest wall, with hyperdensity and beam hardening artifact partially obscuring some of the arterial vascular anatomy, especially the smaller caliber vertebral arteries more so than the carotid artery system.  CTA of the neck shows patency of the right and left vertebral arteries, the right and left common carotid, internal carotid and external carotid arteries and carotid bulb without sign of dissection, occlusion or acute thrombosis of these vessels.  Calcified and noncalcified plaque carotid bulb 30% stenosis.  The left carotid shows no sign of stenosis.  Vertebral arteries partially obscured by the extensive venous reflux, but no sign of gross high-grade stenosis.  The left vertebral artery appears dominant.  As mentioned above, from the left arm contrast injection, there is extensive venous reflux.  The left brachiocephalic vein appears pinched between the anterior  aspect of the aorta and the posterior right side of the sternum, for example series 2, image 50, and this likely results in restriction to the passage of contrast/blood to this area and resulting reflux and multiple collaterals.  The aorta does not appear aneurysmal.  No sign of upper lobe pneumonia. Note is made of degenerative changes present in the spine.            CONCLUSION:  No signs of occlusion or high-grade stenosis involving the carotid arteries.  No vertebral artery stenosis identified.  Assessment somewhat limited because of the extensive reflux of high density contrast material into the venous system of the neck, supraclavicular areas and upper chest.   LOCATION:  [facility]   Dictated by (CST): Rodolfo Peoples MD on 8/05/2024 at 11:21 PM     Finalized by (CST): Rodolfo Peoples MD on 8/05/2024 at 11:27 PM       XR CHEST AP PORTABLE  (CPT=71045)    Result Date: 8/5/2024  PROCEDURE:  XR CHEST AP PORTABLE  (CPT=71045)  TECHNIQUE:  AP chest radiograph was obtained.  COMPARISON:  [facility] , XR, XR CHEST AP PORTABLE  (CPT=71045), 2/09/2021, 1:15 PM.  INDICATIONS:  not feeling well, elevated blood pressure  PATIENT STATED HISTORY: (As transcribed by Technologist)  Pt. with elevate blood pressure. waekness. and dizziness.    FINDINGS:  Lung volumes are satisfactory.  No new consolidation.  CP angles remain sharp.  No pneumothorax.  Heart and pulmonary vessels appear stable, likely with mild cardiomegaly allowing for portable technique.  There is a large hiatus hernia.  This appears increased in size compared to the prior.  Transverse dimension measured at 11.5 cm, remeasured on the prior at 9.2 cm. Smooth mediastinal contours.             CONCLUSION:  Large hiatus hernia, increased in size compared to the prior.   LOCATION:  [facility]      Dictated by (CST): Blayne Daly MD on 8/05/2024 at 7:35 PM     Finalized by (CST): Blayne Daly MD on 8/05/2024 at 7:37 PM         Allergies:  Allergies   Allergen Reactions     Prochlorperazine CONFUSION     confusion  TABS; acting goofy      Adhesive Tape OTHER (SEE COMMENTS)     Skin ripped    Compazine     Compazine      TABS; acting goofy         Medications:    sodium chloride    aspirin    busPIRone    carvedilol    [START ON 8/6/2024] levothyroxine    [START ON 8/6/2024] losartan    atorvastatin    sertraline    [START ON 8/6/2024] spironolactone    gabapentin    acetaminophen    melatonin    guaiFENesin ER    benzonatate    glycerin-hypromellose-    sodium chloride    [START ON 8/6/2024] enoxaparin    ondansetron    metoclopramide    polyethylene glycol (PEG 3350)    sennosides    bisacodyl    fleet enema    labetalol    hydrALAzine    sertraline    Assessment:   HTN  - SBP>200  on arrival  - Improved with vasotec in ED  - Continue home medications    Elevated troponin  - Prior history of takotsubo  - Troponin 65>325>460  - Trend troponin  - Echo in am        Plan:    - Continue to monitor overnight  - Formal Cardiology consult to follow in AM.       SANDIP Tee  [facility]  8/5/2024  11:53 PM

## 2024-08-07 ENCOUNTER — PATIENT OUTREACH (OUTPATIENT)
Dept: CASE MANAGEMENT | Age: 84
End: 2024-08-07

## 2024-08-07 ENCOUNTER — TELEPHONE (OUTPATIENT)
Dept: FAMILY MEDICINE CLINIC | Facility: CLINIC | Age: 84
End: 2024-08-07

## 2024-08-07 NOTE — CDS QUERY
Dear Dr. Mondragon:  PLEASE SELECT THE MOST APPROPRIATE DIAGNOSIS:  (   ) Hypertensive Urgency Confirmed  (   ) Hypertensive Emergency Confirmed (please specify associated organ damage):_______   (   ) Other:____________________________________________________________  ___________________________________________________________________________________________                                                Documentation from the Medical Record:  Discharge summary: Brief Synopsis: pt w/ HTNsive urgency. Likely 2/2 dehydration due to heat stroke. Improved with just IVF. BP normalized. Echo and CTA carotids unremarkable. Pt back to baseline. Ok to DC home     Cardiology consult: Hypertensive emergency, Elevated high-sensitivity troponin - suspect 2/2 supply/demand mismatch 2/2 above, Hx stress induced cardiomyopathy, Non-obstructive CAD - noted [facility] 2021    RISK FACTORS/CLINICAL INDICATORS: 84 y F w PMH HTn, HDL , hx of takatsubo, non-obstructive cad who presents with complaint of just not feeling well and not like herself. States she had turned off the AC so it was very hot and humid overnight. This prevented her from sleeping. This was followed by nausea and dry heaving. She denies chest pain, palpitations, shortness of breath, abdominal pain, fevers, chills. Noted to have BP in the 200 systolic range on arrival to ER   TROP 65 to 325 to 460 to 479 to 418; Cre0.86 BUN 10  EKG: NSR with PAC When compared with ECG of 10-FEB-2021 08:49,   Nonspecific T wave abnormality no longer evident in Lateral leads   2D echo Left ventricle: The cavity size was normal. Wall thickness was normal. Systolic function was vigorous. The estimated ejection fraction was 70-75%, by visual assessment. No diagnostic evidence for regional wall motion abnormalities. Left ventricular diastolic function parameters were normal for the patient's age.     TREATMENT: labs, cardiology consult, IV Vasotec, serial trop, EKG, 2d echo, adjust home BP meds,  IVF    *Hypertensive Urgency: Acute, severe elevation in blood pressure, systolic BP of > 180 mmHG OR diastolic BP > 120 mmHG, without associated organ damage. Symptoms may or may not be present, but can include: severe headache, shortness of breath, nosebleeds, severe anxiety. Treatment: Look for adjustment of oral blood pressure medications; IV medication is not usually required. These patients may not be admitted.  Hypertensive Emergency: Defined as more critical than \"urgency,\" systolic BP of > 180 mmHG OR diastolic BP > 120 mmHG with associated organ damage. Requires immediate treatment, usually with IV medications. Patients require frequent monitoring (vital signs, neuro checks, telemetry). Look for admission to ICU. Ensure the provider documents the specific organ affected and whether it is insufficiency or failure.  *Reference: 3M  For questions regarding this query, please contact Clinical Documentation : Rianna Kang  [phone]  THIS DOCUMENT IS A PERMANENT PART OF THE MEDICAL RECORD

## 2024-08-07 NOTE — TELEPHONE ENCOUNTER
Kristen pts Daughter called requesting hospital follow up appt with Dr. Carrillo, unfortunately sep 18 is first available with pcp. I was able to offer hospital follow up with Sita on 08/13/24.     Unfortunately Kristen did not want to keep that appt after explaining that's the first available when pcp is fully booked we must offer next available appt with Sita.     Kristen stated that's not ok for my 84 year old mom she needs to see pcp ill just need to send her a message myself. I offered to to send a message and give a call back after response from Dr. Carrillo. She stated no thank you ill message her directly and she will get me in sooner.     Kristen was thankful for the information provided and understanding that she was talking to pcp's office just feels more comfortable sending direct message herself.

## 2024-08-08 ENCOUNTER — HOSPITAL ENCOUNTER (EMERGENCY)
Facility: HOSPITAL | Age: 84
Discharge: HOME | End: 2024-08-08
Attending: EMERGENCY MEDICINE
Payer: MEDICARE

## 2024-08-08 VITALS
TEMPERATURE: 98 F | HEIGHT: 62 IN | OXYGEN SATURATION: 98 % | SYSTOLIC BLOOD PRESSURE: 176 MMHG | HEART RATE: 64 BPM | WEIGHT: 178 LBS | DIASTOLIC BLOOD PRESSURE: 82 MMHG | RESPIRATION RATE: 22 BRPM | BODY MASS INDEX: 32.76 KG/M2

## 2024-08-08 DIAGNOSIS — I10 PRIMARY HYPERTENSION: Primary | ICD-10-CM

## 2024-08-08 DIAGNOSIS — F41.9 ANXIETY: ICD-10-CM

## 2024-08-08 DIAGNOSIS — R53.83 OTHER FATIGUE: ICD-10-CM

## 2024-08-08 LAB
ALBUMIN SERPL-MCNC: 4.5 G/DL (ref 3.2–4.8)
ALBUMIN/GLOB SERPL: 1.9 {RATIO} (ref 1–2)
ALP LIVER SERPL-CCNC: 84 U/L
ALT SERPL-CCNC: 13 U/L
ANION GAP SERPL CALC-SCNC: 9 MMOL/L (ref 0–18)
AST SERPL-CCNC: 21 U/L (ref ?–34)
ATRIAL RATE: 58 BPM
BASOPHILS # BLD AUTO: 0.02 X10(3) UL (ref 0–0.2)
BASOPHILS NFR BLD AUTO: 0.4 %
BILIRUB SERPL-MCNC: 0.9 MG/DL (ref 0.2–1.1)
BUN BLD-MCNC: 9 MG/DL (ref 9–23)
CALCIUM BLD-MCNC: 9.8 MG/DL (ref 8.7–10.4)
CHLORIDE SERPL-SCNC: 105 MMOL/L (ref 98–112)
CO2 SERPL-SCNC: 27 MMOL/L (ref 21–32)
CREAT BLD-MCNC: 0.88 MG/DL
EGFRCR SERPLBLD CKD-EPI 2021: 65 ML/MIN/1.73M2 (ref 60–?)
EOSINOPHIL # BLD AUTO: 0.08 X10(3) UL (ref 0–0.7)
EOSINOPHIL NFR BLD AUTO: 1.4 %
ERYTHROCYTE [DISTWIDTH] IN BLOOD BY AUTOMATED COUNT: 12.3 %
FLUAV + FLUBV RNA SPEC NAA+PROBE: NEGATIVE
FLUAV + FLUBV RNA SPEC NAA+PROBE: NEGATIVE
GLOBULIN PLAS-MCNC: 2.4 G/DL (ref 2–3.5)
GLUCOSE BLD-MCNC: 158 MG/DL (ref 70–99)
HCT VFR BLD AUTO: 40.6 %
HGB BLD-MCNC: 13.5 G/DL
IMM GRANULOCYTES # BLD AUTO: 0.01 X10(3) UL (ref 0–1)
IMM GRANULOCYTES NFR BLD: 0.2 %
LYMPHOCYTES # BLD AUTO: 0.9 X10(3) UL (ref 1–4)
LYMPHOCYTES NFR BLD AUTO: 15.9 %
MCH RBC QN AUTO: 30.5 PG (ref 26–34)
MCHC RBC AUTO-ENTMCNC: 33.3 G/DL (ref 31–37)
MCV RBC AUTO: 91.6 FL
MONOCYTES # BLD AUTO: 0.47 X10(3) UL (ref 0.1–1)
MONOCYTES NFR BLD AUTO: 8.3 %
NEUTROPHILS # BLD AUTO: 4.18 X10 (3) UL (ref 1.5–7.7)
NEUTROPHILS # BLD AUTO: 4.18 X10(3) UL (ref 1.5–7.7)
NEUTROPHILS NFR BLD AUTO: 73.8 %
OSMOLALITY SERPL CALC.SUM OF ELEC: 294 MOSM/KG (ref 275–295)
P AXIS: 67 DEGREES
P-R INTERVAL: 228 MS
PLATELET # BLD AUTO: 194 10(3)UL (ref 150–450)
POTASSIUM SERPL-SCNC: 3.9 MMOL/L (ref 3.5–5.1)
PROT SERPL-MCNC: 6.9 G/DL (ref 5.7–8.2)
Q-T INTERVAL: 454 MS
QRS DURATION: 86 MS
QTC CALCULATION (BEZET): 445 MS
R AXIS: -16 DEGREES
RBC # BLD AUTO: 4.43 X10(6)UL
RSV RNA SPEC NAA+PROBE: NEGATIVE
SARS-COV-2 RNA RESP QL NAA+PROBE: NOT DETECTED
SODIUM SERPL-SCNC: 141 MMOL/L (ref 136–145)
T AXIS: 7 DEGREES
TROPONIN I SERPL HS-MCNC: 77 NG/L
VENTRICULAR RATE: 58 BPM
WBC # BLD AUTO: 5.7 X10(3) UL (ref 4–11)

## 2024-08-08 PROCEDURE — 80053 COMPREHEN METABOLIC PANEL: CPT | Performed by: EMERGENCY MEDICINE

## 2024-08-08 PROCEDURE — 84484 ASSAY OF TROPONIN QUANT: CPT | Performed by: EMERGENCY MEDICINE

## 2024-08-08 PROCEDURE — 93010 ELECTROCARDIOGRAM REPORT: CPT

## 2024-08-08 PROCEDURE — 85025 COMPLETE CBC W/AUTO DIFF WBC: CPT | Performed by: EMERGENCY MEDICINE

## 2024-08-08 PROCEDURE — 99285 EMERGENCY DEPT VISIT HI MDM: CPT

## 2024-08-08 PROCEDURE — 96374 THER/PROPH/DIAG INJ IV PUSH: CPT

## 2024-08-08 PROCEDURE — 93005 ELECTROCARDIOGRAM TRACING: CPT

## 2024-08-08 PROCEDURE — 99284 EMERGENCY DEPT VISIT MOD MDM: CPT

## 2024-08-08 PROCEDURE — 0241U SARS-COV-2/FLU A AND B/RSV BY PCR (GENEXPERT): CPT | Performed by: EMERGENCY MEDICINE

## 2024-08-08 RX ORDER — ALPRAZOLAM 0.25 MG/1
0.25 TABLET ORAL NIGHTLY PRN
Qty: 30 TABLET | Refills: 0 | Status: CANCELLED | OUTPATIENT
Start: 2024-08-08

## 2024-08-08 RX ORDER — ASPIRIN 81 MG/1
324 TABLET, CHEWABLE ORAL ONCE
Status: COMPLETED | OUTPATIENT
Start: 2024-08-08 | End: 2024-08-08

## 2024-08-08 RX ORDER — HYDRALAZINE HYDROCHLORIDE 20 MG/ML
5 INJECTION INTRAMUSCULAR; INTRAVENOUS ONCE
Status: COMPLETED | OUTPATIENT
Start: 2024-08-08 | End: 2024-08-08

## 2024-08-08 RX ORDER — ALPRAZOLAM 0.5 MG/1
0.5 TABLET ORAL ONCE
Status: COMPLETED | OUTPATIENT
Start: 2024-08-08 | End: 2024-08-08

## 2024-08-08 NOTE — PROGRESS NOTES
NCM attempted to contact the pt's daughter Kristen for HFU (preferred #). No answer and pt is currently in ED. NCM will try again later.

## 2024-08-08 NOTE — ED PROVIDER NOTES
Patient Seen in: [facility] Emergency Department      History     Chief Complaint   Patient presents with    Hypertension     Stated Complaint: discharged on tuesday for hypertension. problem not resolved.    Subjective:   HPI    Patient is a 84-year-old woman with a history of Takotsubo cardiomyopathy, nonobstructive coronary disease, high blood pressure, anxiety, recent admission for hypertensive urgency and elevated troponin who presents the emergency room with high blood pressure at home.  Says she still does not feel well.  Generally fatigue and anxious.  Mild nausea.  Took her blood pressure medicines this morning.  No other specific complaints.  Daughter is at bedside helps with her history    Objective:   Past Medical History:    Acute bronchitis    Acute maxillary sinusitis    Anxiety state, unspecified    Arthritis    Back problem    Breast CA (HCC)    Calculus of kidney    Cancer (HCC)    Cancer, colon (HCC)    Exposure to unspecified radiation    /breast    Fatigue    HIGH BLOOD PRESSURE    HIGH CHOLESTEROL    Hyperparathyroidism (HCC)    Hypothyroid    Malignant neoplasm of breast (female), unspecified site    Myocardial infarction (HCC)    Other and unspecified hyperlipidemia    Pain in joint, lower leg    Unspecified disorder of thyroid    Unspecified essential hypertension    Valvular disease              Past Surgical History:   Procedure Laterality Date    Breast surgery procedure unlisted      lumpectomy, right    Cataract      Cataract surgery, complex Bilateral 2021    Cholecystectomy  1974    Colon surgery      removal od cancerous lesion    Colonoscopy      Hysterectomy      not bso    Lumpectomy right      Invasive and DCIS    Chanel biopsy stereo nodule 1 site right (cpt=19081)      DCIS    Nm parathyroid surgery  (cpt=78070)            Other  2006    lithotripsy    Other      parathyroid resection    Other surgical history N/A 2015     Procedure: ANTERIOR POSTERIOR REPAIR;  Surgeon: All Armenta MD;  Location: [facility] MAIN OR    Radiation right      Thyroidectomy                  Social History     Socioeconomic History    Marital status:    Tobacco Use    Smoking status: Never    Smokeless tobacco: Never   Vaping Use    Vaping status: Never Used   Substance and Sexual Activity    Alcohol use: Yes     Comment: holidays    Drug use: Never   Other Topics Concern    Caffeine Concern No    Stress Concern No    Weight Concern No    Special Diet No    Exercise Yes    Seat Belt Yes   Social History Narrative    ** Merged History Encounter **          Social Determinants of Health     Food Insecurity: No Food Insecurity (8/5/2024)    Food Insecurity     Food Insecurity: Never true   Transportation Needs: No Transportation Needs (8/5/2024)    Transportation Needs     Lack of Transportation: No   Housing Stability: Low Risk  (8/5/2024)    Housing Stability     Housing Instability: No              Review of Systems    Positive for stated Chief Complaint: Hypertension    Other systems are as noted in HPI.  Constitutional and vital signs reviewed.      All other systems reviewed and negative except as noted above.    Physical Exam     ED Triage Vitals [08/08/24 1114]   BP (!) 165/100   Pulse 67   Resp 18   Temp 98 °F (36.7 °C)   Temp src Oral   SpO2 97 %   O2 Device None (Room air)       Current Vitals:   Vital Signs  BP: (!) 176/82  Pulse: 64  Resp: 22  Temp: 98 °F (36.7 °C)  Temp src: Oral  MAP (mmHg): (!) 111    Oxygen Therapy  SpO2: 98 %  O2 Device: None (Room air)            Physical Exam    General: Patient is resting comfortably in no acute distress  HEENT: Normal cephalic atraumatic.  Nonicteric sclera.  Moist mucous membranes.  No meningismus.  No adenopathy  Lungs: No tachypnea.  Lungs clear to auscultation bilaterally without rales/rhonchi.  Equal breath sounds bilaterally  Cardiac: No tachycardia.  No murmurs.  Regular rate and  rhythm.  Abdomen: Soft and nontender throughout.  No rebound or guarding  Extremities: No clubbing/cyanosis/edema.  Skin: No rashes, no pallor  Neuro: Awake oriented ×3.  Nonfocal.  Good strength throughout    ED Course     Labs Reviewed   COMP METABOLIC PANEL (14) - Abnormal; Notable for the following components:       Result Value    Glucose 158 (*)     All other components within normal limits   CBC WITH DIFFERENTIAL WITH PLATELET - Abnormal; Notable for the following components:    Lymphocyte Absolute 0.90 (*)     All other components within normal limits   TROPONIN I HIGH SENSITIVITY - Abnormal; Notable for the following components:    Troponin I (High Sensitivity) 77 (*)     All other components within normal limits   SARS-COV-2/FLU A AND B/RSV BY PCR (GENEXPERT) - Normal    Narrative:     This test is intended for the qualitative detection and differentiation of SARS-CoV-2, influenza A, influenza B, and respiratory syncytial virus (RSV) viral RNA in nasopharyngeal or nares swabs from individuals suspected of respiratory viral infection consistent with COVID-19 by their healthcare provider. Signs and symptoms of respiratory viral infection due to SARS-CoV-2, influenza, and RSV can be similar.    Test performed using the Xpert Xpress SARS-CoV-2/FLU/RSV (real time RT-PCR)  assay on the GeneXpert instrument, [employer], [place] [employer] [place] [address].   This test is being used under the Food and Drug Administration's Emergency Use Authorization.    The authorized Fact Sheet for Healthcare Providers for this assay is available upon request from the laboratory.     EKG    Rate, intervals and axes as noted on EKG Report.  Rate: 58  Rhythm: Sinus Rhythm  Reading: Normal sinus rhythm.  First-degree AV block.  No acute ST-T wave changes.  ACT/intervals are noted per otherwise, agree with EKG report                 Troponin is 77, downtrending from previous admission.  Electrolytes normal.  COVID-negative.  CBC noted.       MDM       Patient presents with persistent high blood pressure, fatigue, nausea.  Differential includes non-STEMI, anxiety, other.  EKG nonspecific.  Repeat blood pressure systolic in 180s.  Was given 5 mg of hydralazine.  Will monitor.  Will check troponin.  Continue aspirin.  Will discuss with cardiology once troponin results.    Troponin is much improved.  I just did discussed with [facility] cardiology.  Will continue current blood pressure regimen.  Patient has Xanax for her anxiety at home.  Encouraged to take if needed.  Monitor blood pressure.  She has a follow-up appointment on Tuesday.  If any chest pain, shortness of breath or worsening symptoms return immediately to emergency room.  Discussed with patient's daughter as well                               Medical Decision Making      Disposition and Plan     Clinical Impression:  1. Primary hypertension    2. Anxiety    3. Other fatigue         Disposition:  Discharge  8/8/2024  1:52 pm    Follow-up:  Peg Carrillo MD  [address]  [facility] [address]  [phone]    Follow up in 1 week(s)      Jonah Graham MD  [address] [address]  [phone]    Follow up in 1 week(s)  Office will call you for follow up appt          Medications Prescribed:  Discharge Medication List as of 8/8/2024  2:05 PM

## 2024-08-08 NOTE — DISCHARGE INSTRUCTIONS
Continue medications.  It is okay to take 1 Xanax every 8 hours as needed.  Follow-up with [facility] as scheduled on Tuesday.  Return if feeling worse, any chest pain or shortness of breath, new complaints.

## 2024-08-08 NOTE — ED INITIAL ASSESSMENT (HPI)
Pt to ED with dtr with hypertension. BP at home today 169/98. Recently discharged from inpatient. Denies HA/visual changes. Pt took antihypertensives this morning.

## 2024-08-09 ENCOUNTER — TELEPHONE (OUTPATIENT)
Dept: FAMILY MEDICINE CLINIC | Facility: CLINIC | Age: 84
End: 2024-08-09

## 2024-08-09 NOTE — TELEPHONE ENCOUNTER
NCM received message from daughter Kristen today stating that she is having a hard time getting in to see PCP (Pt only wants to see Dr. Carrillo) and also getting the refill for the Xanax. NCM attempted to call the pt but it was a VM.  .  TCM appointment recommended by 8/13/24 as patient is a High risk for readmission.  Please assist pt's daughter Kristen. Thank you    BOOK BY DATE (last date for TCM): 8/20/24

## 2024-08-09 NOTE — PROGRESS NOTES
NCM received message stating that daughter Kristen is having a hard time getting in to see PCP and also getting refill for her Xanax (There is a pending refill for Xanax). NCM called pt and LMTCB. NCM also sent TE to PCP office regarding appt and status of refill.

## 2024-09-24 ENCOUNTER — LAB ENCOUNTER (OUTPATIENT)
Dept: LAB | Age: 84
End: 2024-09-24
Attending: FAMILY MEDICINE
Payer: MEDICARE

## 2024-09-24 ENCOUNTER — OFFICE VISIT (OUTPATIENT)
Dept: FAMILY MEDICINE CLINIC | Facility: CLINIC | Age: 84
End: 2024-09-24
Payer: MEDICARE

## 2024-09-24 VITALS
BODY MASS INDEX: 31.65 KG/M2 | HEIGHT: 62 IN | HEART RATE: 67 BPM | WEIGHT: 172 LBS | OXYGEN SATURATION: 98 % | DIASTOLIC BLOOD PRESSURE: 80 MMHG | SYSTOLIC BLOOD PRESSURE: 138 MMHG

## 2024-09-24 DIAGNOSIS — E55.9 VITAMIN D DEFICIENCY: ICD-10-CM

## 2024-09-24 DIAGNOSIS — Z23 NEED FOR VACCINATION: ICD-10-CM

## 2024-09-24 DIAGNOSIS — E78.00 PURE HYPERCHOLESTEROLEMIA: Primary | ICD-10-CM

## 2024-09-24 DIAGNOSIS — E78.00 PURE HYPERCHOLESTEROLEMIA: ICD-10-CM

## 2024-09-24 DIAGNOSIS — I10 PRIMARY HYPERTENSION: ICD-10-CM

## 2024-09-24 LAB
ALBUMIN SERPL-MCNC: 4.4 G/DL (ref 3.2–4.8)
ALBUMIN/GLOB SERPL: 1.5 {RATIO} (ref 1–2)
ALP LIVER SERPL-CCNC: 92 U/L
ALT SERPL-CCNC: 14 U/L
ANION GAP SERPL CALC-SCNC: 7 MMOL/L (ref 0–18)
AST SERPL-CCNC: 20 U/L (ref ?–34)
BASOPHILS # BLD AUTO: 0.04 X10(3) UL (ref 0–0.2)
BASOPHILS NFR BLD AUTO: 0.8 %
BILIRUB SERPL-MCNC: 0.9 MG/DL (ref 0.2–1.1)
BUN BLD-MCNC: 7 MG/DL (ref 9–23)
CALCIUM BLD-MCNC: 10.4 MG/DL (ref 8.7–10.4)
CHLORIDE SERPL-SCNC: 104 MMOL/L (ref 98–112)
CHOLEST SERPL-MCNC: 166 MG/DL (ref ?–200)
CO2 SERPL-SCNC: 28 MMOL/L (ref 21–32)
CREAT BLD-MCNC: 0.84 MG/DL
EGFRCR SERPLBLD CKD-EPI 2021: 68 ML/MIN/1.73M2 (ref 60–?)
EOSINOPHIL # BLD AUTO: 0.19 X10(3) UL (ref 0–0.7)
EOSINOPHIL NFR BLD AUTO: 3.8 %
ERYTHROCYTE [DISTWIDTH] IN BLOOD BY AUTOMATED COUNT: 12.6 %
FASTING PATIENT LIPID ANSWER: YES
FASTING STATUS PATIENT QL REPORTED: YES
GLOBULIN PLAS-MCNC: 3 G/DL (ref 2–3.5)
GLUCOSE BLD-MCNC: 128 MG/DL (ref 70–99)
HCT VFR BLD AUTO: 40.5 %
HDLC SERPL-MCNC: 49 MG/DL (ref 40–59)
HGB BLD-MCNC: 13.4 G/DL
IMM GRANULOCYTES # BLD AUTO: 0.01 X10(3) UL (ref 0–1)
IMM GRANULOCYTES NFR BLD: 0.2 %
LDLC SERPL CALC-MCNC: 93 MG/DL (ref ?–100)
LYMPHOCYTES # BLD AUTO: 1.38 X10(3) UL (ref 1–4)
LYMPHOCYTES NFR BLD AUTO: 27.5 %
MCH RBC QN AUTO: 30.3 PG (ref 26–34)
MCHC RBC AUTO-ENTMCNC: 33.1 G/DL (ref 31–37)
MCV RBC AUTO: 91.6 FL
MONOCYTES # BLD AUTO: 0.52 X10(3) UL (ref 0.1–1)
MONOCYTES NFR BLD AUTO: 10.4 %
NEUTROPHILS # BLD AUTO: 2.87 X10 (3) UL (ref 1.5–7.7)
NEUTROPHILS # BLD AUTO: 2.87 X10(3) UL (ref 1.5–7.7)
NEUTROPHILS NFR BLD AUTO: 57.3 %
NONHDLC SERPL-MCNC: 117 MG/DL (ref ?–130)
OSMOLALITY SERPL CALC.SUM OF ELEC: 288 MOSM/KG (ref 275–295)
PLATELET # BLD AUTO: 210 10(3)UL (ref 150–450)
POTASSIUM SERPL-SCNC: 4.6 MMOL/L (ref 3.5–5.1)
PROT SERPL-MCNC: 7.4 G/DL (ref 5.7–8.2)
RBC # BLD AUTO: 4.42 X10(6)UL
SODIUM SERPL-SCNC: 139 MMOL/L (ref 136–145)
TRIGL SERPL-MCNC: 137 MG/DL (ref 30–149)
TSI SER-ACNC: 0.79 MIU/ML (ref 0.55–4.78)
VIT D+METAB SERPL-MCNC: 43.4 NG/ML (ref 30–100)
VLDLC SERPL CALC-MCNC: 22 MG/DL (ref 0–30)
WBC # BLD AUTO: 5 X10(3) UL (ref 4–11)

## 2024-09-24 PROCEDURE — 80053 COMPREHEN METABOLIC PANEL: CPT

## 2024-09-24 PROCEDURE — 85025 COMPLETE CBC W/AUTO DIFF WBC: CPT

## 2024-09-24 PROCEDURE — 36415 COLL VENOUS BLD VENIPUNCTURE: CPT

## 2024-09-24 PROCEDURE — G0008 ADMIN INFLUENZA VIRUS VAC: HCPCS | Performed by: FAMILY MEDICINE

## 2024-09-24 PROCEDURE — 99495 TRANSJ CARE MGMT MOD F2F 14D: CPT | Performed by: FAMILY MEDICINE

## 2024-09-24 PROCEDURE — 90662 IIV NO PRSV INCREASED AG IM: CPT | Performed by: FAMILY MEDICINE

## 2024-09-24 PROCEDURE — 84443 ASSAY THYROID STIM HORMONE: CPT

## 2024-09-24 PROCEDURE — 80061 LIPID PANEL: CPT

## 2024-09-24 PROCEDURE — 82306 VITAMIN D 25 HYDROXY: CPT

## 2024-09-25 DIAGNOSIS — M48.062 LUMBAR STENOSIS WITH NEUROGENIC CLAUDICATION: ICD-10-CM

## 2024-09-25 RX ORDER — GABAPENTIN 100 MG/1
CAPSULE ORAL
Qty: 360 CAPSULE | Refills: 0 | OUTPATIENT
Start: 2024-09-25

## 2024-09-25 NOTE — PROGRESS NOTES
Subjective:   Georgia Tabor is a 84 year old female who presents for hospital follow up.   She was discharged from Inpatient hospital, [facility] to Home     Date of Admission: 8/5/2024  Date of Discharge:  8/6/2024      Discharge Disposition: Home or Self Care     Discharge Diagnosis:  #Hypertensive urgency  #History of Takotsubo's myocarditis  #History of bilateral carotid stenosis  #dehydration  #heat stroke      During the visit, the following was completed:  Obtained and reviewed discharge summary, continuity of care documents, Hospitalist notes, and Cardiology notes  Reviewed Labs (CBC, CMP), Labs (Cardiac markers), US radiology results, X-Ray radiology results, EKG, and Echocardiogram    HPI:     Pt was admitted 1.5 month ago for high BP, heat stroke.  Now feels very well.  Georgia Tabor is a 84 year old female who presents for recheck of hyperlipidemia. Patient reports taking medications as instructed, no medication side effects noted. Denies any generalized muscle aches.  Patient presents for recheck of her hypertension. Pt has been taking medications as instructed, no medication side effects, home BP monitoring in the range of 120-110's systolic and 70-80's diastolic.           History/Other:   Current Medications:  Medication Reconciliation:  I am aware of an inpatient discharge within the last 30 days.  The discharge medication list has been reconciled with the patient's current medication list and reviewed by me.  See medication list for additions of new medication, and changes to current doses of medications and discontinued medications.  Outpatient Medications Marked as Taking for the 9/24/24 encounter (Office Visit) with Peg Carrillo MD   Medication Sig    ALPRAZolam (XANAX) 0.25 MG Oral Tab Take 1 tablet (0.25 mg total) by mouth nightly as needed.    SPIRONOLACTONE 25 MG Oral Tab Take 1/2 tablet (12.5 mg total) by mouth daily    ALPRAZolam 0.25 MG Oral Tab Take 1 tablet (0.25 mg total)  by mouth nightly as needed for Anxiety.    LEVOTHYROXINE 50 MCG Oral Tab Take 1 tablet (50 mcg total) by mouth daily    gabapentin 100 MG Oral Cap 2 cap PO BID x1 week, then 3 cap PO BID    mupirocin 2 % External Cream Apply 1 Application topically 2 (two) times daily as needed.    lidocaine 5 % External Patch Place 1 patch onto the skin daily as needed.    sertraline 25 MG Oral Tab Take 0.5 tablets (12.5 mg total) by mouth daily.    busPIRone 5 MG Oral Tab Take 1 tablet (5 mg total) by mouth 2 (two) times daily.    losartan Potassium 50 MG Oral Tab Take 0.5 tablets (25 mg total) by mouth daily.    carvedilol 6.25 MG Oral Tab Take 1 tablet (6.25 mg total) by mouth 2 (two) times daily.    aspirin EC 81 MG Oral Tab EC Take 1 tablet (81 mg total) by mouth every evening.    Vitamin D3 25 MCG (1000 UT) Oral Tab Take 1 tablet (1,000 Units total) by mouth daily.    simvastatin 20 MG Oral Tab Take 1 tablet (20 mg total) by mouth nightly. Take with 40 mg tablet to total 60 mg nightly.    simvastatin 40 MG Oral Tab Take 1 tablet (40 mg total) by mouth nightly. Take with 20 mg tablet to total 60 mg nightly.    Calcium Carbonate-Vitamin D 600-125 MG-UNIT Oral Tab Take 1 tablet by mouth daily.      Coenzyme Q10 (COQ10) 100 MG Oral Cap Take 1 tablet by mouth daily.      Glucosamine-Fish Oil-EPA-DHA (GLUCOSAMINE & FISH OIL OR) Take 1 capsule by mouth daily.      vitamin E 400 UNITS Oral Cap Take 1 capsule (400 Units total) by mouth daily.       Review of Systems:  GENERAL: weight stable, energy stable, no sweating  SKIN: denies any unusual skin lesions  EYES: denies blurred vision or double vision  HEENT: denies nasal congestion, sinus pain or ST  LUNGS: denies shortness of breath with exertion  CARDIOVASCULAR: denies chest pain on exertion or palpitations  GI: denies abdominal pain, denies heartburn, denies diarrhea  MUSCULOSKELETAL: denies pain, normal range of motion of extremities  NEURO: denies headaches, denies dizziness,  denies weakness  PSYCHE: denies depression or anxiety  HEMATOLOGIC: denies hx of anemia, or bruising, denies bleeding  ENDOCRINE: denies thyroid history  ALL/ASTHMA: denies hx of allergy or asthma    Objective:   Patient's last menstrual period was 04/25/1985.  Estimated body mass index is 31.46 kg/m² as calculated from the following:    Height as of this encounter: 5' 2\" (1.575 m).    Weight as of this encounter: 172 lb (78 kg).   /80   Pulse 67   Ht 5' 2\" (1.575 m)   Wt 172 lb (78 kg)   LMP 04/25/1985   SpO2 98%   BMI 31.46 kg/m²    GENERAL: well developed, well nourished, in no apparent distress  SKIN: no rashes, no suspicious lesions  HEENT: atraumatic, normocephalic, ears and throat are clear  EYES: PERRLA, EOMI, conjunctiva are clear  NECK: supple, no adenopathy, no bruits  CHEST: no chest tenderness  LUNGS: clear to auscultation  CARDIO: RRR without murmur  GI: good BS's, no masses, HSM or tenderness  MUSCULOSKELETAL: back is not tender, FROM of the extremities  EXTREMITIES: no cyanosis, clubbing or edema  NEURO: Oriented times three, cranial nerves are intact, motor and sensory are grossly intact    Assessment & Plan:   1. Pure hypercholesterolemia (Primary)  -     CBC With Differential With Platelet; Future; Expected date: 09/24/2024  -     Comp Metabolic Panel (14); Future; Expected date: 09/24/2024  -     Lipid Panel; Future; Expected date: 09/24/2024  -     Assay, Thyroid Stim Hormone; Future; Expected date: 09/24/2024  2. Vitamin D deficiency  -     Vitamin D; Future; Expected date: 09/24/2024  3. Need for vaccination  -     INFLUENZA VAC HIGH DOSE PRSV FREE  4.HTN: healthy diet, relaxation, Xanax prn d/w the pt and daughter.  Total time: 40 min discussion hospitalization, results, healthy diet.      Return in 3 months (on 12/24/2024).

## 2024-09-27 DIAGNOSIS — M48.062 LUMBAR STENOSIS WITH NEUROGENIC CLAUDICATION: ICD-10-CM

## 2024-09-30 NOTE — TELEPHONE ENCOUNTER
Left message to call back..     Need to find out if patient is still taking this and if so what dosage she is on.

## 2024-09-30 NOTE — TELEPHONE ENCOUNTER
Refill Request    Medication request: gabapentin 100 MG Oral Cap 2 cap PO BID x1 week, then 3 cap PO BID     LOV: 5/8/24 Aston Gonzalez DO   Due back to clinic per last office note:  \"Follow up in 2 months. \"  NOV: Visit date not found      [place]8/24 #360- 62 day supply    Urine drug screen (if applicable): n/a  Pain contract: none    LOV plan (if weaning or changing medications): Per  at last office visit: \"Recommend increasing the gabapentin slowly; 200mg BID x1 week, then 300mg BID. She should contact the clinic in 1 month for a status update; may continue to uptitrate further. Other option remains PT for neutral to flexion lumbar stabilization, or bilateral L4 TFESI under fluoroscopy. \"

## 2024-10-07 ENCOUNTER — PATIENT MESSAGE (OUTPATIENT)
Dept: FAMILY MEDICINE CLINIC | Facility: CLINIC | Age: 84
End: 2024-10-07

## 2024-10-07 DIAGNOSIS — M48.062 LUMBAR STENOSIS WITH NEUROGENIC CLAUDICATION: ICD-10-CM

## 2024-10-07 NOTE — TELEPHONE ENCOUNTER
From: Georgia Tabor  To: Peg Carrillo  Sent: 10/7/2024 11:40 AM CDT  Subject: Medication    Hi Dr. Ruvalcaba-  We had discussed at our last appointment on 9/24, that you would refill my prescription for Gabapentin. Unfortunately, the pharmacy hasn't received the request from your office. And, they have tried to reach out for your approval for a week, and nobody responded.  Please help with this~!  Thank You,  Georgia Tabor

## 2024-10-07 NOTE — TELEPHONE ENCOUNTER
Patient asking if you will refill her gabapentin, originally filled by Dr. Sales. She states at her LOV on 9/24 you said you would refill this for her    Please advise

## 2024-10-08 RX ORDER — GABAPENTIN 100 MG/1
CAPSULE ORAL
Qty: 360 CAPSULE | Refills: 0 | OUTPATIENT
Start: 2024-10-08

## 2024-10-09 RX ORDER — GABAPENTIN 100 MG/1
CAPSULE ORAL
Qty: 360 CAPSULE | Refills: 1 | Status: SHIPPED | OUTPATIENT
Start: 2024-10-09

## 2024-10-21 DIAGNOSIS — R42 DIZZINESS: ICD-10-CM

## 2024-10-21 RX ORDER — BUSPIRONE HYDROCHLORIDE 5 MG/1
5 TABLET ORAL 2 TIMES DAILY
Qty: 60 TABLET | Refills: 0 | Status: SHIPPED | OUTPATIENT
Start: 2024-10-21

## 2024-10-21 NOTE — TELEPHONE ENCOUNTER
Medication: BUSPIRONE 5 MG Oral Tab      Date of last refill: 09/15/2023 (#60/11)  [place]     Last office visit: 9/15/2023  Due back to clinic per last office note:  12 months   Date next office visit scheduled:    Future Appointments   Date Time Provider Department Center   3/25/2025 12:20 PM Peg Carrillo MD [facility] [place]           Last OV note recommendation:    Assessment  ASSESSMENT/PLAN:      (R42) Dizziness  (primary encounter diagnosis)  Plan: busPIRone 5 MG Oral Tab        (F41.9) Anxiety        Dizziness and anxiety   MRI brain negative for acute abnormality     Continue Buspar 5 mg BID- quite effective per patient  Continue low dose Zoloft per PCP    Follow with 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 The patient indicates understanding of these issues and agrees with the plan.           Jose Hayes MD  [facility] [place]

## 2024-11-16 DIAGNOSIS — R42 DIZZINESS: ICD-10-CM

## 2024-11-18 RX ORDER — BUSPIRONE HYDROCHLORIDE 5 MG/1
5 TABLET ORAL 2 TIMES DAILY
Qty: 60 TABLET | Refills: 0 | Status: SHIPPED | OUTPATIENT
Start: 2024-11-18

## 2024-11-18 NOTE — TELEPHONE ENCOUNTER
Medication: BUSPIRONE 5 MG Oral Tab      Date of last refill: 10/21/2024 (#60/0)  [place]     Last office visit: 09/15/2023  Due back to clinic per last office note:  1 year  Date next office visit scheduled:    Future Appointments   Date Time Provider Department Center   3/25/2025 12:20 PM Peg Carrillo MD [facility] [place]           Last OV note recommendation:    Assessment  ASSESSMENT/PLAN:      (R42) Dizziness  (primary encounter diagnosis)  Plan: busPIRone 5 MG Oral Tab        (F41.9) Anxiety        Dizziness and anxiety   MRI brain negative for acute abnormality     Continue Buspar 5 mg BID- quite effective per patient  Continue low dose Zoloft per PCP    Follow with 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 The patient indicates understanding of these issues and agrees with the plan.           Jose Hayes MD  [facility]        No orders of the defined types were placed in this encounter.

## 2024-12-16 DIAGNOSIS — R42 DIZZINESS: ICD-10-CM

## 2024-12-16 RX ORDER — BUSPIRONE HYDROCHLORIDE 5 MG/1
5 TABLET ORAL 2 TIMES DAILY
Qty: 60 TABLET | Refills: 0 | Status: SHIPPED | OUTPATIENT
Start: 2024-12-16

## 2024-12-16 RX ORDER — SERTRALINE HYDROCHLORIDE 25 MG/1
25 TABLET, FILM COATED ORAL DAILY
Qty: 90 TABLET | Refills: 0 | Status: SHIPPED | OUTPATIENT
Start: 2024-12-16

## 2024-12-16 NOTE — TELEPHONE ENCOUNTER
Medication: BUSPIRONE 5 MG Oral Tab      Date of last refill: 11/18/2024 (#60/0)  [place]     Last office visit: 9/15/2023  Due back to clinic per last office note:  1 year  Date next office visit scheduled:    Future Appointments   Date Time Provider Department Center   3/25/2025 12:20 PM Peg Carrillo MD [facility] [place]           Last OV note recommendation:    Assessment  ASSESSMENT/PLAN:      (R42) Dizziness  (primary encounter diagnosis)  Plan: busPIRone 5 MG Oral Tab        (F41.9) Anxiety        Dizziness and anxiety   MRI brain negative for acute abnormality     Continue Buspar 5 mg BID- quite effective per patient  Continue low dose Zoloft per PCP    Follow with 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 The patient indicates understanding of these issues and agrees with the plan.           Jose Hayes MD  [facility]

## 2024-12-16 NOTE — TELEPHONE ENCOUNTER
Medication(s) to Refill:   Requested Prescriptions     Pending Prescriptions Disp Refills    SERTRALINE 25 MG Oral Tab [Pharmacy Med Name: Sertraline Hydrochloride 25 Mg Tab Nort] 90 tablet 0     Sig: TAKE ONE TABLET BY MOUTH ONE TIME DAILY         Reason for Medication Refill being sent to Provider / Reason Protocol Failed:  [] 90 day refill has already been granted  [] Blood Pressure out of range  [] Labs Abnormal/over due  [] Medication not previously prescribed by Provider  [] Non-Protocol Medication  [] Controlled Substance   [] Due for appointment- no future appointment scheduled  [] No Follow up specified      Last Time Medication was Filled:  12/19/23      Last Office Visit with PCP: 9/24/24    When Patient was Due Back to the Office:  3 months  (from when PCP last addressed condition)    Future Appointments:  Future Appointments   Date Time Provider Department Center   3/25/2025 12:20 PM Peg Carrillo MD [facility] [place]         Last Blood Pressures:  BP Readings from Last 2 Encounters:   09/24/24 138/80   08/08/24 (!) 176/82       Action taken:  [] Refill approved per protocol  [] Routing to provider for approval

## 2025-01-15 DIAGNOSIS — R42 DIZZINESS: ICD-10-CM

## 2025-01-15 RX ORDER — BUSPIRONE HYDROCHLORIDE 5 MG/1
5 TABLET ORAL 2 TIMES DAILY
Qty: 60 TABLET | Refills: 0 | Status: SHIPPED | OUTPATIENT
Start: 2025-01-15

## 2025-01-15 NOTE — TELEPHONE ENCOUNTER
Sent the patient a [facility] message to make an appointment for further medication refills. Patient has not been seen since 2023.       Medication: BUSPIRONE 5 MG Oral Tab      Date of last refill: 12/16/2024 (#60/0)  [place]     Last office visit: 09/15/2023  Due back to clinic per last office note:  Around 09/15/2024  Date next office visit scheduled:    Future Appointments   Date Time Provider Department Center   3/25/2025 12:20 PM Peg Carrillo MD [facility] [place]           Last OV note recommendation:    Assessment  ASSESSMENT/PLAN:      (R42) Dizziness  (primary encounter diagnosis)  Plan: busPIRone 5 MG Oral Tab        (F41.9) Anxiety        Dizziness and anxiety   MRI brain negative for acute abnormality     Continue Buspar 5 mg BID- quite effective per patient  Continue low dose Zoloft per PCP    Follow with 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 The patient indicates understanding of these issues and agrees with the plan.           Jose Hayes MD  [facility]        No orders of the defined types were placed in this encounter.

## 2025-02-09 DIAGNOSIS — R42 DIZZINESS: ICD-10-CM

## 2025-02-09 DIAGNOSIS — M48.062 LUMBAR STENOSIS WITH NEUROGENIC CLAUDICATION: ICD-10-CM

## 2025-02-10 RX ORDER — BUSPIRONE HYDROCHLORIDE 5 MG/1
5 TABLET ORAL 2 TIMES DAILY
Qty: 60 TABLET | Refills: 0 | Status: SHIPPED | OUTPATIENT
Start: 2025-02-10

## 2025-02-10 NOTE — TELEPHONE ENCOUNTER
Medication:  BUSPIRONE 5 MG Oral Tab      Date of last refill: 01/15/2025 (#60/0)  [place]     Last office visit: 9/15/2023  Due back to clinic per last office note:  12 Months   Date next office visit scheduled:    Future Appointments   Date Time Provider Department Center   3/25/2025 12:20 PM Peg Carrillo MD [facility] [place]           Last OV note recommendation:    Assessment  ASSESSMENT/PLAN:      (R42) Dizziness  (primary encounter diagnosis)  Plan: busPIRone 5 MG Oral Tab        (F41.9) Anxiety        Dizziness and anxiety   MRI brain negative for acute abnormality     Continue Buspar 5 mg BID- quite effective per patient  Continue low dose Zoloft per PCP    Follow with 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 The patient indicates understanding of these issues and agrees with the plan.

## 2025-02-11 ENCOUNTER — TELEPHONE (OUTPATIENT)
Dept: FAMILY MEDICINE CLINIC | Facility: CLINIC | Age: 85
End: 2025-02-11

## 2025-02-11 DIAGNOSIS — M48.062 LUMBAR STENOSIS WITH NEUROGENIC CLAUDICATION: ICD-10-CM

## 2025-02-11 RX ORDER — GABAPENTIN 100 MG/1
CAPSULE ORAL
Qty: 180 CAPSULE | Refills: 3 | Status: SHIPPED | OUTPATIENT
Start: 2025-02-11

## 2025-02-11 RX ORDER — GABAPENTIN 100 MG/1
CAPSULE ORAL
Qty: 360 CAPSULE | Refills: 0 | Status: SHIPPED | OUTPATIENT
Start: 2025-02-11 | End: 2025-02-11

## 2025-02-11 NOTE — TELEPHONE ENCOUNTER
[facility] called requesting we resubmit gabapentin 100 MG Oral Cap to say take 3 capsules by mouth twice a day.     Per pharmacy this is a refill Rx and they have already done the initial Sig TAKE 2 capsules by mouth twice a day for 1 week, then 3 capsules by mouth twice a day. Back in 10/09/24

## 2025-03-07 ENCOUNTER — HOSPITAL ENCOUNTER (OUTPATIENT)
Dept: CT IMAGING | Age: 85
Discharge: HOME | End: 2025-03-07
Attending: INTERNAL MEDICINE
Payer: MEDICARE

## 2025-03-07 DIAGNOSIS — I87.1 VEIN COMPRESSION: ICD-10-CM

## 2025-03-07 LAB
CREAT BLD-MCNC: 0.9 MG/DL
EGFRCR SERPLBLD CKD-EPI 2021: 63 ML/MIN/1.73M2 (ref 60–?)

## 2025-03-07 PROCEDURE — 74177 CT ABD & PELVIS W/CONTRAST: CPT | Performed by: INTERNAL MEDICINE

## 2025-03-07 PROCEDURE — 82565 ASSAY OF CREATININE: CPT

## 2025-03-08 ENCOUNTER — PATIENT MESSAGE (OUTPATIENT)
Dept: FAMILY MEDICINE CLINIC | Facility: CLINIC | Age: 85
End: 2025-03-08

## 2025-03-09 DIAGNOSIS — R42 DIZZINESS: ICD-10-CM

## 2025-03-10 NOTE — TELEPHONE ENCOUNTER
Spoke with the patient daughter Kristen who states that the patient is no longer seeing Dr. Hayes and that Dr. Carrillo is refilling this medication. Office will refuse.       Medication: BUSPIRONE 5 MG Oral Tab      Date of last refill: 02/10/2025 (#60/0)  [place]     Last office visit: 09/15/2023  Due back to clinic per last office note:  Around 09/15/2024  Date next office visit scheduled:    Future Appointments   Date Time Provider Department Center   4/23/2025 12:20 PM Peg Carrillo MD [facility] [place]           Last OV note recommendation:    Assessment  ASSESSMENT/PLAN:      (R42) Dizziness  (primary encounter diagnosis)  Plan: busPIRone 5 MG Oral Tab        (F41.9) Anxiety        Dizziness and anxiety   MRI brain negative for acute abnormality     Continue Buspar 5 mg BID- quite effective per patient  Continue low dose Zoloft per PCP    Follow with 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 The patient indicates understanding of these issues and agrees with the plan.

## 2025-03-11 ENCOUNTER — NURSE TRIAGE (OUTPATIENT)
Dept: FAMILY MEDICINE CLINIC | Facility: CLINIC | Age: 85
End: 2025-03-11

## 2025-03-11 DIAGNOSIS — R42 DIZZINESS: ICD-10-CM

## 2025-03-11 RX ORDER — BUSPIRONE HYDROCHLORIDE 5 MG/1
5 TABLET ORAL 2 TIMES DAILY
Qty: 60 TABLET | Refills: 0 | OUTPATIENT
Start: 2025-03-11

## 2025-03-11 RX ORDER — BUSPIRONE HYDROCHLORIDE 5 MG/1
5 TABLET ORAL 2 TIMES DAILY
Qty: 60 TABLET | Refills: 0 | Status: SHIPPED | OUTPATIENT
Start: 2025-03-11

## 2025-03-11 RX ORDER — AZITHROMYCIN 250 MG/1
TABLET, FILM COATED ORAL
Qty: 6 TABLET | Refills: 0 | Status: SHIPPED | OUTPATIENT
Start: 2025-03-11 | End: 2025-03-16

## 2025-03-11 NOTE — TELEPHONE ENCOUNTER
Attempted to reach pt's daughter, left VM to callback office and ask to speak with a nurse regarding symptoms reported in [facility] message.  Will postpone for verification pt daughter calls office.

## 2025-03-11 NOTE — TELEPHONE ENCOUNTER
Action Requested: Summary for Provider     []  Critical Lab, Recommendations Needed  [] Need Additional Advice  []   FYI    []   Need Orders  [] Need Medications Sent to Pharmacy  []  Other     SUMMARY: Provided home care advice per protocol. Will keep us updated. Advised to call back if worse or new symptoms. Pt agreeable to plan.     Reason for call: Nasal Congestion  Onset: Friday   Reason for Disposition   Sinus congestion as part of a cold, present < 10 days    Protocols used: Sinus Pain or Congestion-A-OH    Daughter also sick (see other chart for triage)  Pt's symptoms started on Friday   C/o nasal congestion and mild cough  Denies fever, sinus pressure/pain, chest pain, HA and shortness of breath   Has tried Sudafed and Robitussin   Offered visit vs home care advice  Pt agreeable to try and manage at home. Will call back if worse or persistent symptoms   Provided home care advice per protocol. Advised on when to call us back   Pt appreciative of call and recommendations.

## 2025-03-11 NOTE — TELEPHONE ENCOUNTER
LF-2/10/2025  LOV-9/24/2024  This was prescribed by Freddy Garcia,patient doesn't see this anymore.

## 2025-03-17 RX ORDER — SERTRALINE HYDROCHLORIDE 25 MG/1
25 TABLET, FILM COATED ORAL DAILY
Qty: 90 TABLET | Refills: 0 | Status: SHIPPED | OUTPATIENT
Start: 2025-03-17

## 2025-03-20 ENCOUNTER — APPOINTMENT (OUTPATIENT)
Dept: CT IMAGING | Facility: HOSPITAL | Age: 85
End: 2025-03-20
Attending: EMERGENCY MEDICINE
Payer: MEDICARE

## 2025-03-20 ENCOUNTER — HOSPITAL ENCOUNTER (OUTPATIENT)
Facility: HOSPITAL | Age: 85
Setting detail: OBSERVATION
Discharge: HOME HEALTH | End: 2025-03-21
Attending: EMERGENCY MEDICINE | Admitting: STUDENT IN AN ORGANIZED HEALTH CARE EDUCATION/TRAINING PROGRAM
Payer: MEDICARE

## 2025-03-20 DIAGNOSIS — R11.0 INTRACTABLE NAUSEA: Primary | ICD-10-CM

## 2025-03-20 LAB
ALBUMIN SERPL-MCNC: 4.6 G/DL (ref 3.2–4.8)
ALBUMIN/GLOB SERPL: 1.7 {RATIO} (ref 1–2)
ALP LIVER SERPL-CCNC: 101 U/L
ALT SERPL-CCNC: 10 U/L
ANION GAP SERPL CALC-SCNC: 12 MMOL/L (ref 0–18)
AST SERPL-CCNC: 19 U/L (ref ?–34)
BASOPHILS # BLD AUTO: 0.04 X10(3) UL (ref 0–0.2)
BASOPHILS NFR BLD AUTO: 0.5 %
BILIRUB SERPL-MCNC: 0.7 MG/DL (ref 0.2–1.1)
BILIRUB UR QL STRIP.AUTO: NEGATIVE
BUN BLD-MCNC: 11 MG/DL (ref 9–23)
CALCIUM BLD-MCNC: 9.7 MG/DL (ref 8.7–10.6)
CHLORIDE SERPL-SCNC: 105 MMOL/L (ref 98–112)
CLARITY UR REFRACT.AUTO: CLEAR
CO2 SERPL-SCNC: 27 MMOL/L (ref 21–32)
CREAT BLD-MCNC: 0.89 MG/DL
EGFRCR SERPLBLD CKD-EPI 2021: 63 ML/MIN/1.73M2 (ref 60–?)
EOSINOPHIL # BLD AUTO: 0.31 X10(3) UL (ref 0–0.7)
EOSINOPHIL NFR BLD AUTO: 4.2 %
ERYTHROCYTE [DISTWIDTH] IN BLOOD BY AUTOMATED COUNT: 12.8 %
GLOBULIN PLAS-MCNC: 2.7 G/DL (ref 2–3.5)
GLUCOSE BLD-MCNC: 173 MG/DL (ref 70–99)
GLUCOSE UR STRIP.AUTO-MCNC: NORMAL MG/DL
HCT VFR BLD AUTO: 41.5 %
HGB BLD-MCNC: 13.7 G/DL
HYALINE CASTS #/AREA URNS AUTO: PRESENT /LPF
IMM GRANULOCYTES # BLD AUTO: 0.02 X10(3) UL (ref 0–1)
IMM GRANULOCYTES NFR BLD: 0.3 %
KETONES UR STRIP.AUTO-MCNC: NEGATIVE MG/DL
LEUKOCYTE ESTERASE UR QL STRIP.AUTO: 25
LIPASE SERPL-CCNC: 33 U/L (ref 12–53)
LYMPHOCYTES # BLD AUTO: 1.28 X10(3) UL (ref 1–4)
LYMPHOCYTES NFR BLD AUTO: 17.2 %
MCH RBC QN AUTO: 29.3 PG (ref 26–34)
MCHC RBC AUTO-ENTMCNC: 33 G/DL (ref 31–37)
MCV RBC AUTO: 88.9 FL
MONOCYTES # BLD AUTO: 0.59 X10(3) UL (ref 0.1–1)
MONOCYTES NFR BLD AUTO: 7.9 %
NEUTROPHILS # BLD AUTO: 5.2 X10 (3) UL (ref 1.5–7.7)
NEUTROPHILS # BLD AUTO: 5.2 X10(3) UL (ref 1.5–7.7)
NEUTROPHILS NFR BLD AUTO: 69.9 %
NITRITE UR QL STRIP.AUTO: NEGATIVE
OSMOLALITY SERPL CALC.SUM OF ELEC: 302 MOSM/KG (ref 275–295)
PH UR STRIP.AUTO: 5 [PH] (ref 5–8)
PLATELET # BLD AUTO: 235 10(3)UL (ref 150–450)
POTASSIUM SERPL-SCNC: 3.7 MMOL/L (ref 3.5–5.1)
PROT SERPL-MCNC: 7.3 G/DL (ref 5.7–8.2)
PROT UR STRIP.AUTO-MCNC: 20 MG/DL
RBC # BLD AUTO: 4.67 X10(6)UL
RBC UR QL AUTO: NEGATIVE
SODIUM SERPL-SCNC: 144 MMOL/L (ref 136–145)
SP GR UR STRIP.AUTO: 1.02 (ref 1–1.03)
TROPONIN I SERPL HS-MCNC: 3 NG/L
UROBILINOGEN UR STRIP.AUTO-MCNC: NORMAL MG/DL
WBC # BLD AUTO: 7.4 X10(3) UL (ref 4–11)

## 2025-03-20 PROCEDURE — 99223 1ST HOSP IP/OBS HIGH 75: CPT | Performed by: STUDENT IN AN ORGANIZED HEALTH CARE EDUCATION/TRAINING PROGRAM

## 2025-03-20 PROCEDURE — 74177 CT ABD & PELVIS W/CONTRAST: CPT | Performed by: EMERGENCY MEDICINE

## 2025-03-20 RX ORDER — SODIUM CHLORIDE 9 MG/ML
INJECTION, SOLUTION INTRAVENOUS CONTINUOUS
Status: DISCONTINUED | OUTPATIENT
Start: 2025-03-20 | End: 2025-03-21

## 2025-03-20 RX ORDER — METOCLOPRAMIDE HYDROCHLORIDE 5 MG/ML
5 INJECTION INTRAMUSCULAR; INTRAVENOUS ONCE
Status: COMPLETED | OUTPATIENT
Start: 2025-03-20 | End: 2025-03-20

## 2025-03-20 RX ORDER — ONDANSETRON 4 MG/1
4 TABLET, ORALLY DISINTEGRATING ORAL ONCE
Status: COMPLETED | OUTPATIENT
Start: 2025-03-20 | End: 2025-03-20

## 2025-03-20 RX ORDER — MORPHINE SULFATE 2 MG/ML
2 INJECTION, SOLUTION INTRAMUSCULAR; INTRAVENOUS EVERY 30 MIN PRN
Status: DISCONTINUED | OUTPATIENT
Start: 2025-03-20 | End: 2025-03-21

## 2025-03-21 VITALS
HEART RATE: 71 BPM | TEMPERATURE: 98 F | SYSTOLIC BLOOD PRESSURE: 104 MMHG | WEIGHT: 170 LBS | DIASTOLIC BLOOD PRESSURE: 57 MMHG | OXYGEN SATURATION: 90 % | BODY MASS INDEX: 31.28 KG/M2 | HEIGHT: 62 IN | RESPIRATION RATE: 20 BRPM

## 2025-03-21 PROBLEM — K52.9 GASTROENTERITIS: Status: ACTIVE | Noted: 2025-03-21

## 2025-03-21 LAB
ATRIAL RATE: 90 BPM
GLUCOSE BLD-MCNC: 192 MG/DL (ref 70–99)
GLUCOSE BLD-MCNC: 214 MG/DL (ref 70–99)
GLUCOSE BLD-MCNC: 238 MG/DL (ref 70–99)
P AXIS: 60 DEGREES
P-R INTERVAL: 246 MS
Q-T INTERVAL: 406 MS
QRS DURATION: 96 MS
QTC CALCULATION (BEZET): 496 MS
R AXIS: -4 DEGREES
T AXIS: 22 DEGREES
VENTRICULAR RATE: 90 BPM

## 2025-03-21 PROCEDURE — 99239 HOSP IP/OBS DSCHRG MGMT >30: CPT | Performed by: HOSPITALIST

## 2025-03-21 RX ORDER — ATORVASTATIN CALCIUM 10 MG/1
10 TABLET, FILM COATED ORAL NIGHTLY
Status: DISCONTINUED | OUTPATIENT
Start: 2025-03-21 | End: 2025-03-21

## 2025-03-21 RX ORDER — POLYETHYLENE GLYCOL 3350 17 G/17G
17 POWDER, FOR SOLUTION ORAL DAILY PRN
Status: DISCONTINUED | OUTPATIENT
Start: 2025-03-21 | End: 2025-03-21

## 2025-03-21 RX ORDER — METOCLOPRAMIDE HYDROCHLORIDE 5 MG/ML
5 INJECTION INTRAMUSCULAR; INTRAVENOUS EVERY 8 HOURS PRN
Status: DISCONTINUED | OUTPATIENT
Start: 2025-03-21 | End: 2025-03-21

## 2025-03-21 RX ORDER — SODIUM CHLORIDE 9 MG/ML
INJECTION, SOLUTION INTRAVENOUS CONTINUOUS
Status: DISCONTINUED | OUTPATIENT
Start: 2025-03-21 | End: 2025-03-21

## 2025-03-21 RX ORDER — SENNOSIDES 8.6 MG
17.2 TABLET ORAL NIGHTLY PRN
Status: DISCONTINUED | OUTPATIENT
Start: 2025-03-21 | End: 2025-03-21

## 2025-03-21 RX ORDER — NICOTINE POLACRILEX 4 MG
30 LOZENGE BUCCAL
Status: DISCONTINUED | OUTPATIENT
Start: 2025-03-21 | End: 2025-03-21

## 2025-03-21 RX ORDER — NICOTINE POLACRILEX 4 MG
15 LOZENGE BUCCAL
Status: DISCONTINUED | OUTPATIENT
Start: 2025-03-21 | End: 2025-03-21

## 2025-03-21 RX ORDER — ONDANSETRON 2 MG/ML
4 INJECTION INTRAMUSCULAR; INTRAVENOUS EVERY 6 HOURS PRN
Status: DISCONTINUED | OUTPATIENT
Start: 2025-03-21 | End: 2025-03-21

## 2025-03-21 RX ORDER — ECHINACEA PURPUREA EXTRACT 125 MG
1 TABLET ORAL
Status: DISCONTINUED | OUTPATIENT
Start: 2025-03-21 | End: 2025-03-21

## 2025-03-21 RX ORDER — CARVEDILOL 6.25 MG/1
6.25 TABLET ORAL 2 TIMES DAILY WITH MEALS
Status: DISCONTINUED | OUTPATIENT
Start: 2025-03-21 | End: 2025-03-21

## 2025-03-21 RX ORDER — ASPIRIN 81 MG/1
81 TABLET ORAL EVERY EVENING
Status: DISCONTINUED | OUTPATIENT
Start: 2025-03-21 | End: 2025-03-21

## 2025-03-21 RX ORDER — BENZONATATE 200 MG/1
200 CAPSULE ORAL 3 TIMES DAILY PRN
Status: DISCONTINUED | OUTPATIENT
Start: 2025-03-21 | End: 2025-03-21

## 2025-03-21 RX ORDER — LOSARTAN POTASSIUM 25 MG/1
25 TABLET ORAL DAILY
Status: DISCONTINUED | OUTPATIENT
Start: 2025-03-21 | End: 2025-03-21

## 2025-03-21 RX ORDER — SODIUM PHOSPHATE, DIBASIC AND SODIUM PHOSPHATE, MONOBASIC 7; 19 G/230ML; G/230ML
1 ENEMA RECTAL ONCE AS NEEDED
Status: DISCONTINUED | OUTPATIENT
Start: 2025-03-21 | End: 2025-03-21

## 2025-03-21 RX ORDER — ENOXAPARIN SODIUM 100 MG/ML
40 INJECTION SUBCUTANEOUS DAILY
Status: DISCONTINUED | OUTPATIENT
Start: 2025-03-21 | End: 2025-03-21

## 2025-03-21 RX ORDER — BUSPIRONE HYDROCHLORIDE 5 MG/1
5 TABLET ORAL 2 TIMES DAILY
Status: DISCONTINUED | OUTPATIENT
Start: 2025-03-21 | End: 2025-03-21

## 2025-03-21 RX ORDER — SERTRALINE HYDROCHLORIDE 25 MG/1
25 TABLET, FILM COATED ORAL DAILY
Status: DISCONTINUED | OUTPATIENT
Start: 2025-03-21 | End: 2025-03-21

## 2025-03-21 RX ORDER — LEVOTHYROXINE SODIUM 50 UG/1
50 TABLET ORAL
Status: DISCONTINUED | OUTPATIENT
Start: 2025-03-21 | End: 2025-03-21

## 2025-03-21 RX ORDER — LABETALOL HYDROCHLORIDE 5 MG/ML
10 INJECTION, SOLUTION INTRAVENOUS EVERY 4 HOURS PRN
Status: DISCONTINUED | OUTPATIENT
Start: 2025-03-21 | End: 2025-03-21

## 2025-03-21 RX ORDER — CHOLECALCIFEROL (VITAMIN D3) 25 MCG
1000 TABLET ORAL DAILY
Status: DISCONTINUED | OUTPATIENT
Start: 2025-03-21 | End: 2025-03-21

## 2025-03-21 RX ORDER — BISACODYL 10 MG
10 SUPPOSITORY, RECTAL RECTAL
Status: DISCONTINUED | OUTPATIENT
Start: 2025-03-21 | End: 2025-03-21

## 2025-03-21 RX ORDER — ALPRAZOLAM 0.25 MG
0.25 TABLET ORAL NIGHTLY PRN
Status: DISCONTINUED | OUTPATIENT
Start: 2025-03-21 | End: 2025-03-21

## 2025-03-21 RX ORDER — SPIRONOLACTONE 25 MG/1
12.5 TABLET ORAL DAILY
Status: DISCONTINUED | OUTPATIENT
Start: 2025-03-21 | End: 2025-03-21

## 2025-03-21 RX ORDER — ACETAMINOPHEN 500 MG
500 TABLET ORAL EVERY 4 HOURS PRN
Status: DISCONTINUED | OUTPATIENT
Start: 2025-03-21 | End: 2025-03-21

## 2025-03-21 RX ORDER — DEXTROSE MONOHYDRATE 25 G/50ML
50 INJECTION, SOLUTION INTRAVENOUS
Status: DISCONTINUED | OUTPATIENT
Start: 2025-03-21 | End: 2025-03-21

## 2025-03-21 RX ORDER — GABAPENTIN 300 MG/1
300 CAPSULE ORAL 2 TIMES DAILY
Status: DISCONTINUED | OUTPATIENT
Start: 2025-03-21 | End: 2025-03-21

## 2025-03-21 NOTE — ED QUICK NOTES
Orders for admission, patient is aware of plan and ready to go upstairs. Any questions, please call ED RN gurjit at extension [facility].     Patient Covid vaccination status: Fully vaccinated     COVID Test Ordered in ED: None    COVID Suspicion at Admission: N/A    Running Infusions:    sodium chloride 125 mL/hr (03/20/25 2209)        Mental Status/LOC at time of transport: a/ox3    Other pertinent information:   CIWA score: N/A   NIH score:  N/A

## 2025-03-21 NOTE — DISCHARGE SUMMARY
[place] HOSPITALIST  DISCHARGE SUMMARY     Georgia Tabor Patient Status:  Observation    1940 MRN [id number]   Location [facility] 4NW-A Attending Arlen Luciano,    Hosp Day # 0 PCP Peg Carrillo MD     Date of Admission: 3/20/2025  Date of Discharge: 3/21/2025  Discharge Disposition: Home or Self Care  Chief complaint:   Chief Complaint   Patient presents with    Vomiting    Nausea/Vomiting/Diarrhea         Discharge Diagnoses and Brief Synopsis:  #Intractable nausea and emesis; suspsect gastroenteritis; improved with conservative care     #hiatal hernia     #HTN      #DM     #mood disorderm     #chronic pain     #hypothyroidism     #HLD        Lab/Test results pending at Discharge:   none        Admission History of Present Illness (author of HPI not necessarily myself; see H&P author): Georgia Tabor is a 85 year old female with PMHx hypothyroidism/ HLD/ HTN/ DM who presented to the hospital for nausea. She ate [employer] pizza around 2 PM and then was having problems with nausea and dry heaving. She denied any abdominal pain, fever, chills, diarrhea, constipation, bloody stools. Her family also ate the pizza and did not get sick.       Lace+ Score: 73  59-90 High Risk  29-58 Medium Risk  0-28   Low Risk  Patient was referred to the [facility].    TCM Follow-Up Recommendation:  LACE > 58: High Risk of readmission after discharge from the hospital.      Discharge Medication List:     Discharge Medications        CONTINUE taking these medications        Instructions Prescription details   ALPRAZolam 0.25 MG Tabs  Commonly known as: Xanax      Take 1 tablet (0.25 mg total) by mouth nightly as needed.   Quantity: 90 tablet  Refills: 1     aspirin 81 MG Tbec      Take 1 tablet (81 mg total) by mouth every evening.   Refills: 0     busPIRone 5 MG Tabs  Commonly known as: Buspar      Take 1 tablet (5 mg total) by mouth 2 (two) times daily.   Quantity: 60 tablet  Refills: 0      Calcium Carbonate-Vitamin D 600-125 MG-UNIT Tabs      Take 1 tablet by mouth daily.   Refills: 0     carvedilol 6.25 MG Tabs  Commonly known as: Coreg      Take 1 tablet (6.25 mg total) by mouth 2 (two) times daily.   Quantity: 60 tablet  Refills: 3     cholecalciferol 25 MCG (1000 UT) Tabs  Commonly known as: Vitamin D3      Take 1 tablet (1,000 Units total) by mouth daily.   Refills: 0     CoQ10 100 MG Caps      Take 1 tablet by mouth daily.   Refills: 0     gabapentin 100 MG Caps  Commonly known as: Neurontin      Take 3 capsules by mouth twice a day.   Quantity: 180 capsule  Refills: 3     GLUCOSAMINE & FISH OIL OR      Take 1 capsule by mouth daily.   Refills: 0     levothyroxine 50 MCG Tabs  Commonly known as: Synthroid      Take 1 tablet (50 mcg total) by mouth daily   Quantity: 90 tablet  Refills: 3     lidocaine 5 % Ptch  Commonly known as: Lidoderm      Place 1 patch onto the skin daily as needed.   Refills: 0     losartan 50 MG Tabs  Commonly known as: Cozaar      Take 0.5 tablets (25 mg total) by mouth daily.   Quantity:    Refills: 0     mupirocin 2 % Crea  Commonly known as: Bactroban      Apply 1 Application topically 2 (two) times daily as needed.   Quantity: 1 each  Refills: 1     sertraline 25 MG Tabs  Commonly known as: Zoloft      TAKE ONE TABLET BY MOUTH ONE TIME DAILY   Quantity: 90 tablet  Refills: 0     simvastatin 20 MG Tabs  Commonly known as: Zocor      Take 3 tablets (60 mg total) by mouth nightly. Take with 40 mg tablet to total 60 mg nightly.   Refills: 0     spironolactone 25 MG Tabs  Commonly known as: Aldactone      Take 1/2 tablet (12.5 mg total) by mouth daily   Quantity: 15 tablet  Refills: 0     vitamin E 400 UNITS Caps  Commonly known as: Alpha-E      Take 1 capsule (400 Units total) by mouth daily.   Refills: 0              [place]    Follow-up appointment:   [facility]  Nicki Rodriguez  [facility] [id number] [phone]  Follow up  in 3 day(s)      Appointments for Next 30 Days 3/22/2025 - 4/21/2025      None            Vital signs:  Temp:  [97.5 °F (36.4 °C)-98.9 °F (37.2 °C)] 97.5 °F (36.4 °C)  Pulse:  [71-84] 71  Resp:  [18-20] 20  BP: (104-123)/(57-66) 104/57  SpO2:  [90 %-91 %] 90 %    Physical Exam:    General: No acute distress.   Respiratory: Clear to auscultation bilaterally. No wheezes. No rhonchi.  Cardiovascular: S1, S2. Regular rate and rhythm. No murmurs.  Abdomen: Soft, nontender, nondistended.    Extremities: No edema.  -----------------------------------------------------------------------------------------------  PATIENT DISCHARGE INSTRUCTIONS: See electronic chart    Jason Xavier MD    Time spent:   31 minutes

## 2025-03-21 NOTE — ED PROVIDER NOTES
Patient Seen in: [facility] Emergency Department      History     Chief Complaint   Patient presents with    Vomiting    Nausea/Vomiting/Diarrhea     Stated Complaint: nausea and vomiting    Subjective:   HPI      85-year-old female presenting to the emergency department for abdominal pain and vomiting.  Patient reports about an hour after having some [employer] pizza she started having some lower abdominal discomfort and having some nausea and some dry heaves prompting her visit here.  She denies any sort of fevers chills cough cold runny nose or any other exacerbating relieving factor    Objective:     Past Medical History:    Acute bronchitis    Acute maxillary sinusitis    Anxiety state, unspecified    Arthritis    Back problem    Breast CA (HCC)    Calculus of kidney    Cancer (HCC)    Cancer, colon (HCC)    Exposure to unspecified radiation    2008/breast    Fatigue    HIGH BLOOD PRESSURE    HIGH CHOLESTEROL    Hyperparathyroidism (HCC)    Hypothyroid    Malignant neoplasm of breast (female), unspecified site    Myocardial infarction (HCC)    Other and unspecified hyperlipidemia    Pain in joint, lower leg    Unspecified disorder of thyroid    Unspecified essential hypertension    Valvular disease              Past Surgical History:   Procedure Laterality Date    Breast surgery procedure unlisted      lumpectomy, right    Cataract      Cataract surgery, complex Bilateral 2021    Cholecystectomy  1974    Colon surgery      removal od cancerous lesion    Colonoscopy      Hysterectomy      not bso    Lumpectomy right      Invasive and DCIS    Chanel biopsy stereo nodule 1 site right (cpt=19081)      DCIS    Nm parathyroid surgery  (cpt=78070)            Other  2006    lithotripsy    Other      parathyroid resection    Other surgical history N/A 2015    Procedure: ANTERIOR POSTERIOR REPAIR;  Surgeon: All Armenta MD;  Location:  MAIN OR    Radiation right       Thyroidectomy                  Social History     Socioeconomic History    Marital status:    Tobacco Use    Smoking status: Never    Smokeless tobacco: Never   Vaping Use    Vaping status: Never Used   Substance and Sexual Activity    Alcohol use: Yes     Comment: holidays    Drug use: Never   Other Topics Concern    Caffeine Concern No    Stress Concern No    Weight Concern No    Special Diet No    Exercise Yes    Seat Belt Yes   Social History Narrative    ** Merged History Encounter **          Social Drivers of Health     Food Insecurity: No Food Insecurity (8/5/2024)    Food Insecurity     Food Insecurity: Never true   Transportation Needs: No Transportation Needs (8/5/2024)    Transportation Needs     Lack of Transportation: No   Housing Stability: Low Risk  (8/5/2024)    Housing Stability     Housing Instability: No                  Physical Exam     ED Triage Vitals [03/20/25 1951]   BP (!) 194/99   Pulse 86   Resp 20   Temp (!) 96.4 °F (35.8 °C)   Temp src Temporal   SpO2 93 %   O2 Device None (Room air)       Current Vitals:   Vital Signs  BP: 160/90  Pulse: 89  Resp: 20  Temp: 97.5 °F (36.4 °C)  Temp src: Oral  MAP (mmHg): 100    Oxygen Therapy  SpO2: 95 %  O2 Device: None (Room air)        Physical Exam  Awake alert patient appears no distress HEENT exam is normal lungs are clear cardiovascular exam regular rhythm abdomen soft nontender extremities no Cyanosis or edema no rash    ED Course     Labs Reviewed   COMP METABOLIC PANEL (14) - Abnormal; Notable for the following components:       Result Value    Glucose 173 (*)     Calculated Osmolality 302 (*)     All other components within normal limits   URINALYSIS WITH CULTURE REFLEX - Abnormal; Notable for the following components:    Protein Urine 20 (*)     Leukocyte Esterase Urine 25 (*)     Squamous Epi. Cells Few (*)     Hyaline Casts Present (*)     All other components within normal limits   LIPASE - Normal   TROPONIN I HIGH SENSITIVITY -  Normal   CBC WITH DIFFERENTIAL WITH PLATELET   RAINBOW DRAW LAVENDER   RAINBOW DRAW LIGHT GREEN   RAINBOW DRAW BLUE   URINE CULTURE, ROUTINE            Differential diagnosis includes obstruction, perforation       MDM          Admission disposition: 3/20/2025 11:45 PM           Medical Decision Making  85-year-old female presenting with lower abdominal pain vomiting.  IV established cardiac monitor shows a sinus rhythm pulse ox shows no signs of hypoxia.  CBC shows normal white cell count metabolic panel stable renal function independent interpreted by ED physician CT scan shows no signs of perforation or obstruction patient continues to have intractable nausea and emerged part will be admitted for further evaluation.    Problems Addressed:  Intractable nausea: acute illness or injury    Amount and/or Complexity of Data Reviewed  Labs: ordered. Decision-making details documented in ED Course.  Radiology: ordered and independent interpretation performed. Decision-making details documented in ED Course.  ECG/medicine tests: ordered and independent interpretation performed. Decision-making details documented in ED Course.        Disposition and Plan     Clinical Impression:  1. Intractable nausea         Disposition:  Admit  3/20/2025 11:45 pm    Follow-up:  No follow-up provider specified.        Medications Prescribed:  Current Discharge Medication List              Supplementary Documentation:         Hospital Problems       Present on Admission  Date Reviewed: 3/20/2025            ICD-10-CM Noted POA    * (Principal) Intractable nausea R11.0 3/20/2025 Unknown

## 2025-03-21 NOTE — H&P
[facility]     Georgia Tabor Patient Status:  Emergency    1940 MRN [id number]   Location [facility] EMERGENCY DEPARTMENT Attending Jalen Cox MD   Hosp Day # 0 PCP Peg Carrillo MD     Chief Complaint: nausea    Subjective:    History of Present Illness:     Georgia Tabor is a 85 year old female with PMHx hypothyroidism/ HLD/ HTN/ DM who presented to the hospital for nausea. She ate [employer] pizza around 2 PM and then was having problems with nausea and dry heaving. She denied any abdominal pain, fever, chills, diarrhea, constipation, bloody stools. Her family also ate the pizza and did not get sick.     History/Other:    Past Medical History:  Past Medical History:    Acute bronchitis    Acute maxillary sinusitis    Anxiety state, unspecified    Arthritis    Back problem    Breast CA (HCC)    Calculus of kidney    Cancer (HCC)    Cancer, colon (HCC)    Exposure to unspecified radiation    2008/breast    Fatigue    HIGH BLOOD PRESSURE    HIGH CHOLESTEROL    Hyperparathyroidism (HCC)    Hypothyroid    Malignant neoplasm of breast (female), unspecified site    Myocardial infarction (HCC)    Other and unspecified hyperlipidemia    Pain in joint, lower leg    Unspecified disorder of thyroid    Unspecified essential hypertension    Valvular disease     Past Surgical History:   Past Surgical History:   Procedure Laterality Date    Breast surgery procedure unlisted      lumpectomy, right    Cataract      Cataract surgery, complex Bilateral 2021    Cholecystectomy  1974    Colon surgery      removal od cancerous lesion    Colonoscopy      Hysterectomy      not bso    Lumpectomy right      Invasive and DCIS    Chanel biopsy stereo nodule 1 site right (cpt=19081)      DCIS    Nm parathyroid surgery  (cpt=78070)            Other  2006    lithotripsy    Other      parathyroid resection    Other surgical history N/A 2015    Procedure:  ANTERIOR POSTERIOR REPAIR;  Surgeon: All Armenta MD;  Location: [facility] MAIN OR    Radiation right      Thyroidectomy        Family History:   Family History   Problem Relation Age of Onset    Heart Disorder Father     Other (acute MI) Father     Heart Disease Father     Other (amyotrophic lateral sclerosis) Mother     Other (ALS) Mother     Other (CABG) Sister     Diabetes Sister     Cancer Sister     Obesity Sister     Heart Disease Sister     Heart Disease Sister     Breast Cancer Self 68    Heart Disorder Brother      Social History:    reports that she has never smoked. She has never used smokeless tobacco. She reports current alcohol use. She reports that she does not use drugs.     Allergies: Allergies[1]    Medications:  Medications Ordered Prior to Encounter[2]    Review of Systems:   A comprehensive review of systems was completed.    Pertinent positives and negatives noted in the HPI.    Objective:   Physical Exam:    /90   Pulse 89   Temp 97.5 °F (36.4 °C) (Oral)   Resp 20   Ht 5' 2\" (1.575 m)   Wt 170 lb (77.1 kg)   LMP 04/25/1985   SpO2 95%   BMI 31.09 kg/m²   General: No acute distress, Alert  Respiratory: No rhonchi, no wheezes  Cardiovascular: S1, S2. Regular rate and rhythm  Abdomen: Soft, Non-tender, non-distended, positive bowel sounds  Neuro: No new focal deficits  Extremities: No edema    Results:    Labs:      Labs Last 24 Hours:    Recent Labs   Lab 03/20/25 2004   RBC 4.67   HGB 13.7   HCT 41.5   MCV 88.9   MCH 29.3   MCHC 33.0   RDW 12.8   NEPRELIM 5.20   WBC 7.4   .0       Recent Labs   Lab 03/20/25 2004   *   BUN 11   CREATSERUM 0.89   EGFRCR 63   CA 9.7   ALB 4.6      K 3.7      CO2 27.0   ALKPHO 101   AST 19   ALT 10   BILT 0.7   TP 7.3       Estimated Glomerular Filtration Rate: 63 mL/min/1.73m2 (result from lab).    Lab Results   Component Value Date    INR 1.04 10/05/2020       Recent Labs   Lab 03/20/25 2004   TROPHS 3       No results for  input(s): \"TROP\", \"PBNP\" in the last 168 hours.    No results for input(s): \"PCT\" in the last 168 hours.    Imaging: Imaging data reviewed in Epic.    Assessment & Plan:      #Intractable nausea and emesis  #hiatal hernia  -LFT WNL  -CT showing large hiatal hernia with distention and debris  -suspect gastroenteritis  -zofran prn  -liquid diet as tolerated    #HTN urgency  -Bp as high as 194/99  -goal 25% reduction voer 24 hrs  -labetalol prn  -monitor on tele  -cont home spironolactone, losartan, carvedilol    #DM  -SSI, QID checks, hypoglycemia protocol    #mood disorder  -cont home sertraline, buspirone, alprazolam    #chronic pain  -cont home gabapentin, lidocaine patch    #hypothyroidism  -cont home levothyroxine    #HLD  -cont home statin          Plan of care discussed with ED physician    Arlen Luciano DO    Supplementary Documentation:     The 21st Century Cures Act makes medical notes like these available to patients in the interest of transparency. Please be advised this is a medical document. Medical documents are intended to carry relevant information, facts as evident, and the clinical opinion of the practitioner. The medical note is intended as peer to peer communication and may appear blunt or direct. It is written in medical language and may contain abbreviations or verbiage that are unfamiliar.                                       [1]   Allergies  Allergen Reactions    Prochlorperazine CONFUSION     confusion  TABS; acting goofy      Adhesive Tape OTHER (SEE COMMENTS)     Skin ripped    Compazine     Compazine      TABS; acting goofy     [2]   Current Facility-Administered Medications on File Prior to Encounter   Medication Dose Route Frequency Provider Last Rate Last Admin    [COMPLETED] iopamidol 76% (ISOVUE-370) injection for power injector  150 mL Intravenous ONCE PRN Erwin Lui MD   150 mL at 03/07/25 1128     Current Outpatient Medications on File Prior to Encounter   Medication Sig  Dispense Refill    SERTRALINE 25 MG Oral Tab TAKE ONE TABLET BY MOUTH ONE TIME DAILY 90 tablet 0    [] azithromycin (ZITHROMAX Z-ALLYSON) 250 MG Oral Tab Take 2 tablets (500 mg total) by mouth daily for 1 day, THEN 1 tablet (250 mg total) daily for 4 days. 6 tablet 0    busPIRone 5 MG Oral Tab Take 1 tablet (5 mg total) by mouth 2 (two) times daily. 60 tablet 0    gabapentin 100 MG Oral Cap Take 3 capsules by mouth twice a day. 180 capsule 3    ALPRAZolam (XANAX) 0.25 MG Oral Tab Take 1 tablet (0.25 mg total) by mouth nightly as needed. 90 tablet 1    SPIRONOLACTONE 25 MG Oral Tab Take 1/2 tablet (12.5 mg total) by mouth daily 15 tablet 0    ALPRAZolam 0.25 MG Oral Tab Take 1 tablet (0.25 mg total) by mouth nightly as needed for Anxiety.      LEVOTHYROXINE 50 MCG Oral Tab Take 1 tablet (50 mcg total) by mouth daily 90 tablet 3    mupirocin 2 % External Cream Apply 1 Application topically 2 (two) times daily as needed. 1 each 1    lidocaine 5 % External Patch Place 1 patch onto the skin daily as needed.      losartan Potassium 50 MG Oral Tab Take 0.5 tablets (25 mg total) by mouth daily.  0    carvedilol 6.25 MG Oral Tab Take 1 tablet (6.25 mg total) by mouth 2 (two) times daily. 60 tablet 3    aspirin EC 81 MG Oral Tab EC Take 1 tablet (81 mg total) by mouth every evening.      Vitamin D3 25 MCG (1000 UT) Oral Tab Take 1 tablet (1,000 Units total) by mouth daily.      simvastatin 20 MG Oral Tab Take 1 tablet (20 mg total) by mouth nightly. Take with 40 mg tablet to total 60 mg nightly.      simvastatin 40 MG Oral Tab Take 1 tablet (40 mg total) by mouth nightly. Take with 20 mg tablet to total 60 mg nightly.      Calcium Carbonate-Vitamin D 600-125 MG-UNIT Oral Tab Take 1 tablet by mouth daily.        Coenzyme Q10 (COQ10) 100 MG Oral Cap Take 1 tablet by mouth daily.        Glucosamine-Fish Oil-EPA-DHA (GLUCOSAMINE & FISH OIL OR) Take 1 capsule by mouth daily.        vitamin E 400 UNITS Oral Cap Take 1 capsule  (400 Units total) by mouth daily.

## 2025-03-21 NOTE — PLAN OF CARE
PT Aox4. VSS.   No complain of N/V/D.   Diet advanced. Tolerated well.         NURSING DISCHARGE NOTE    Discharged Home via Wheelchair.  Accompanied by Family member  Belongings Taken by patient/family.  Discharge teaching provided and verbalized understanding.     Problem: GASTROINTESTINAL - ADULT  Goal: Minimal or absence of nausea and vomiting  Description: INTERVENTIONS:- Maintain adequate hydration with IV or PO as ordered and tolerated- Nasogastric tube to low intermittent suction as ordered- Evaluate effectiveness of ordered antiemetic medications- Provide nonpharmacologic comfort measures as appropriate- Advance diet as tolerated, if ordered- Obtain nutritional consult as needed- Evaluate fluid balance  Outcome: Progressing

## 2025-03-21 NOTE — PROGRESS NOTES
[facility]   part of [facility]     Hospitalist Progress Note     Georgia Tabor Patient Status:  Observation    1940 MRN [id number]   Location [facility] 4NW-A Attending Arlen Luciano,    Hosp Day # 0 PCP Peg Carrillo MD     Chief Complaint:   Chief Complaint   Patient presents with    Vomiting    Nausea/Vomiting/Diarrhea       Subjective:     Patient denies any complaints.     Objective:    Review of Systems:   6  point ROS completed and was negative, except for pertinent positive and negatives stated in subjective.    Vital signs:  Temp:  [96.4 °F (35.8 °C)-99 °F (37.2 °C)] 98.9 °F (37.2 °C)  Pulse:  [83-96] 84  Resp:  [18-24] 18  BP: (123-194)/(66-99) 123/66  SpO2:  [90 %-99 %] 91 %    Physical Exam:    General: No acute distress.   Respiratory: Clear to auscultation bilaterally. No wheezes. No rhonchi.  Cardiovascular: S1, S2. Regular rate and rhythm. No murmurs.  Abdomen: Soft, nontender, nondistended.    Extremities: No edema.    Diagnostic Data:    Labs:  Recent Labs   Lab 25   WBC 7.4   HGB 13.7   MCV 88.9   .0       Recent Labs   Lab 25   *   BUN 11   CREATSERUM 0.89   CA 9.7   ALB 4.6      K 3.7      CO2 27.0   ALKPHO 101   AST 19   ALT 10   BILT 0.7   TP 7.3       Estimated Creatinine Clearance: 36.6 mL/min (based on SCr of 0.89 mg/dL).    No results for input(s): \"PTP\", \"INR\" in the last 168 hours.         COVID-19 Lab Results    COVID-19  Lab Results   Component Value Date    COVID19 Not Detected 2024    COVID19 Not Detected 2024    COVID19 Not Detected 2021       Pro-Calcitonin  No results for input(s): \"PCT\" in the last 168 hours.    Cardiac  No results for input(s): \"TROP\", \"PBNP\" in the last 168 hours.    Creatinine Kinase  No results for input(s): \"CK\" in the last 168 hours.    Inflammatory Markers  No results for input(s): \"CRP\", \"ANDRE\", \"LDH\", \"DDIMER\" in the last 168 hours.    Recent Labs   Lab  03/20/25 2004   TROPHS 3       Imaging: Imaging data reviewed in Epic.    Medications:    enoxaparin  40 mg Subcutaneous Daily    insulin aspart  2-10 Units Subcutaneous TID AC and HS    aspirin  81 mg Oral QPM    busPIRone  5 mg Oral BID    gabapentin  300 mg Oral BID    levothyroxine  50 mcg Oral Before breakfast    losartan  25 mg Oral Daily    sertraline  25 mg Oral Daily    spironolactone  12.5 mg Oral Daily    cholecalciferol  1,000 Units Oral Daily    carvedilol  6.25 mg Oral BID with meals    atorvastatin  30 mg Oral Nightly       Assessment & Plan:      #Intractable nausea and emesis; suspsect gastroenteritis; seems better now; ADAT    #hiatal hernia     #HTN urgency  -cont home spironolactone, losartan, carvedilol     #DM-SSI, QID checks, hypoglycemia protocol     #mood disorder-cont home sertraline, buspirone, alprazolam     #chronic pain-cont home gabapentin, lidocaine patch     #hypothyroidism-cont home levothyroxine     #HLD-cont home statin    Home today if tolerates advancing diet    Plan of care discussed with patient and RN    Jason Xavier MD    Supplementary Documentation:     Quality:  DVT Prophylaxis: scds, lovenox  CODE status: see chart  [place]: no  Central line: no      Estimated date of discharge: 0-1 days  At this point Ms. Tabor is expected to be discharge to: home

## 2025-03-21 NOTE — PROGRESS NOTES
Pt is on clear liquids and has no complaints at this time. Pt had high blood pressure and prn labetalol given with good results. Pt has been having nausea and emesis at home. Pt instructed on call light when getting up.

## 2025-03-24 ENCOUNTER — PATIENT OUTREACH (OUTPATIENT)
Dept: CASE MANAGEMENT | Age: 85
End: 2025-03-24

## 2025-03-24 NOTE — PROGRESS NOTES
KAREN spoke with patient's daughter Kristen, states patient is resting at this time. Kristen states patient's BP was good yesterday and the day before. She states pt has an ultrasound scheduled this afternoon at the [facility]. She prefers call back tomorrow. KAREN will try calling back tomorrow.

## 2025-03-25 NOTE — PROGRESS NOTES
Left message on mailbox for patient to call care manager back for transitional care management/hospital follow-up call.  Care  information included in message.      MyChart message sent to patient for transitional care management outreach.

## 2025-03-26 ENCOUNTER — PATIENT OUTREACH (OUTPATIENT)
Dept: CASE MANAGEMENT | Age: 85
End: 2025-03-26

## 2025-03-26 ENCOUNTER — TELEPHONE (OUTPATIENT)
Dept: FAMILY MEDICINE CLINIC | Facility: CLINIC | Age: 85
End: 2025-03-26

## 2025-03-26 NOTE — TELEPHONE ENCOUNTER
Spoke to patient for Transitions of Care call today.  Patient does not have an appointment scheduled at this time.  ER Follow-up appointment needed by 4/1/25.  Please advise.    BOOK BY DATE: 4/1/25    Clinical staff:  Please follow-up with patient and try to get them to schedule as patient would greatly benefit from ER Follow-up.  Thank you!       Patient declined TCC or sooner appointment with PCP. ARI

## 2025-03-26 NOTE — PROGRESS NOTES
Transitions of Care Navigation  Discharge Date: 3/21/25  Contact Date: 3/26/2025    Transitions of Care Assessment:  NILTON Initial Assessment    General:  Assessment completed with: Patient  Patient Subjective: NCM spoke with patient states she feels tired, but overall is feeling okay. Patient denies any fevers, chills, nausea, vomiting, shortness of breath, chest pain or any other symptoms. Patient does not check FBS, states she is not diabetic. Patient declined TCC appointment. She denies any abdominal pain, bloating, or any other symptoms. Patient denies any questions or concerns at this time. Patient declined sooner appointment than 25, she will keep scheduled appointment.  Chief Complaint: nausea, gastroenteritis  Verify patient name and  with patient/ caregiver: Yes    Hospital Stay/Discharge:  Tell me what you understand of why you were in the hospital or emergency department: Patient reports to ED with nausea.  Prior to leaving the hospital were your Discharge Instructions reviewed with you?: Yes  Did you receive a copy of your written Discharge Instructions?: Yes  What questions do you have about your Discharge Instructions?: none  Do you feel better or worse since you left the hospital or emergency department?: Better    Follow - Up Appointment:  Do you have a follow-up appointment?: Yes  Date: 25  Physician: PCP  Are there any barriers to getting to your follow-up appointment?: No    Home Health/DME:  Prior to leaving the hospital was Home Health (HH) arranged for you?: N/A  Are HH needs identified by staff during the assessment?: No     Prior to leaving the hospital or emergency department was Durable Medical Equipment (DME), medical supplies, or infusions arranged for you?: N/A  Are DME/medical supply/infusions needs identified by staff during this assessment?: No     Medications/Diet:  Did any of your medications change, during or after your hospital stay or ED visit?: No  Do you understand  what your medications are for and possible side effects?: Yes  Are there any reasons that keep you from taking your medication as prescribed?: No  Any concerns about medication refills?: No    Were you given a different diet per your Discharge Instructions?: No  Reason: n/a     Questions/Concerns:  Do you have any questions or concerns that have not been discussed?: No   NILTON Follow-up Assessment    General:  Assessment completed with: Patient  Patient Subjective: NCM spoke with patient states she feels tired, but overall is feeling okay. Patient denies any fevers, chills, nausea, vomiting, shortness of breath, chest pain or any other symptoms. Patient does not check FBS, states she is not diabetic. Patient declined TCC appointment. She denies any abdominal pain, bloating, or any other symptoms. Patient denies any questions or concerns at this time. Patient declined sooner appointment than 4/23/25, she will keep scheduled appointment.  Chief Complaint: nausea, gastroenteritis      Nursing Interventions:  All discharge instructions reviewed with the patient. Reviewed when to call MD vs when to call 911 or go the ED. Educated patient on the importance of taking all meds as prescribed as well as close f/u with PCP/specialists. Pt verbalized understanding and will contact the office with any further questions or concerns. Patient denies fevers, chills, nausea, vomiting, shortness of breath, chest pain, or any other symptoms at this time.  NCM attempted to schedule HFU, patient declined will call PCP/TCC office directly. NC sent TE to PCP office re: assistance in scheduling HFU appt. [facility] provided contact information for any further questions/concerns. Patient verbalized understanding and agreeable.       Medications:  Medication Reconciliation:  I am aware of an inpatient discharge within the last 30 days.  The discharge medication list has been reconciled with the patient's current medication list and reviewed by me.  See medication list for additions of new medication, and changes to current doses of medications and discontinued medications.  Current Outpatient Medications   Medication Sig Dispense Refill    SERTRALINE 25 MG Oral Tab TAKE ONE TABLET BY MOUTH ONE TIME DAILY 90 tablet 0    busPIRone 5 MG Oral Tab Take 1 tablet (5 mg total) by mouth 2 (two) times daily. 60 tablet 0    gabapentin 100 MG Oral Cap Take 3 capsules by mouth twice a day. 180 capsule 3    ALPRAZolam (XANAX) 0.25 MG Oral Tab Take 1 tablet (0.25 mg total) by mouth nightly as needed. 90 tablet 1    SPIRONOLACTONE 25 MG Oral Tab Take 1/2 tablet (12.5 mg total) by mouth daily 15 tablet 0    LEVOTHYROXINE 50 MCG Oral Tab Take 1 tablet (50 mcg total) by mouth daily 90 tablet 3    mupirocin 2 % External Cream Apply 1 Application topically 2 (two) times daily as needed. 1 each 1    lidocaine 5 % External Patch Place 1 patch onto the skin daily as needed.      losartan Potassium 50 MG Oral Tab Take 0.5 tablets (25 mg total) by mouth daily.  0    carvedilol 6.25 MG Oral Tab Take 1 tablet (6.25 mg total) by mouth 2 (two) times daily. 60 tablet 3    aspirin EC 81 MG Oral Tab EC Take 1 tablet (81 mg total) by mouth every evening.      Vitamin D3 25 MCG (1000 UT) Oral Tab Take 1 tablet (1,000 Units total) by mouth daily.      simvastatin 20 MG Oral Tab Take 3 tablets (60 mg total) by mouth nightly. Take with 40 mg tablet to total 60 mg nightly.      Calcium Carbonate-Vitamin D 600-125 MG-UNIT Oral Tab Take 1 tablet by mouth daily.        Coenzyme Q10 (COQ10) 100 MG Oral Cap Take 1 tablet by mouth daily.        Glucosamine-Fish Oil-EPA-DHA (GLUCOSAMINE & FISH OIL OR) Take 1 capsule by mouth daily.        vitamin E 400 UNITS Oral Cap Take 1 capsule (400 Units total) by mouth daily.           Follow-up Appointments:  Your appointments       Date & Time Appointment Department (Center)    Apr 23, 2025 12:20 PM T Medicare Annual Well Visit with Peg Carrillo MD [facility]  [facility], [address] ([facility])              [facility], [facility] [address], [facility]  [address]  [facility] [id number] [phone]            [facility]  Was TCC Ordered: No      Primary Care Provider (If no TCC appointment)  Does patient already have a PCP appointment scheduled? No  Nurse Care Manager Attempted to schedule PCP office TCM/NILTON appointment with patient   -If no appointment scheduled: Explain : pt declines sooner appt than 4/23/25.    Specialist  Does the patient have any other follow-up appointment(s) need to be scheduled? No       Book By Date: 4/1/25

## 2025-03-26 NOTE — PROGRESS NOTES
TCC appointment request (discharged 03/21)    [facility]  [address]  [place], [place] [id number] [phone]  Patient's daughter, Kristen declined appointment; advised patient has existing appointment with her Primary Wed 04/23 @12:20pm  Closing encounter

## 2025-04-04 DIAGNOSIS — R42 DIZZINESS: ICD-10-CM

## 2025-04-07 ENCOUNTER — MED REC SCAN ONLY (OUTPATIENT)
Dept: FAMILY MEDICINE CLINIC | Facility: CLINIC | Age: 85
End: 2025-04-07

## 2025-04-08 RX ORDER — BUSPIRONE HYDROCHLORIDE 5 MG/1
5 TABLET ORAL 2 TIMES DAILY
Qty: 180 TABLET | Refills: 3 | Status: SHIPPED | OUTPATIENT
Start: 2025-04-08

## 2025-04-23 ENCOUNTER — OFFICE VISIT (OUTPATIENT)
Dept: FAMILY MEDICINE CLINIC | Facility: CLINIC | Age: 85
End: 2025-04-23
Payer: MEDICARE

## 2025-04-23 ENCOUNTER — LAB ENCOUNTER (OUTPATIENT)
Dept: LAB | Age: 85
End: 2025-04-23
Attending: FAMILY MEDICINE
Payer: MEDICARE

## 2025-04-23 VITALS
SYSTOLIC BLOOD PRESSURE: 118 MMHG | DIASTOLIC BLOOD PRESSURE: 60 MMHG | OXYGEN SATURATION: 98 % | HEIGHT: 62 IN | BODY MASS INDEX: 31.65 KG/M2 | WEIGHT: 172 LBS | HEART RATE: 53 BPM

## 2025-04-23 DIAGNOSIS — Z00.00 MEDICARE ANNUAL WELLNESS VISIT, SUBSEQUENT: Primary | ICD-10-CM

## 2025-04-23 DIAGNOSIS — R60.0 LOCALIZED EDEMA: ICD-10-CM

## 2025-04-23 DIAGNOSIS — E55.9 VITAMIN D DEFICIENCY: ICD-10-CM

## 2025-04-23 DIAGNOSIS — K44.9 HIATAL HERNIA: ICD-10-CM

## 2025-04-23 DIAGNOSIS — E11.65 TYPE 2 DIABETES MELLITUS WITH HYPERGLYCEMIA, WITHOUT LONG-TERM CURRENT USE OF INSULIN (HCC): ICD-10-CM

## 2025-04-23 PROBLEM — R73.9 HYPERGLYCEMIA: Status: RESOLVED | Noted: 2024-08-05 | Resolved: 2025-04-23

## 2025-04-23 PROBLEM — G89.29 CHRONIC BILATERAL LOW BACK PAIN WITHOUT SCIATICA: Status: RESOLVED | Noted: 2024-03-19 | Resolved: 2025-04-23

## 2025-04-23 PROBLEM — I10 MALIGNANT HYPERTENSION: Status: RESOLVED | Noted: 2024-08-05 | Resolved: 2025-04-23

## 2025-04-23 PROBLEM — R79.89 ELEVATED TROPONIN: Status: RESOLVED | Noted: 2024-08-05 | Resolved: 2025-04-23

## 2025-04-23 PROBLEM — R11.0 INTRACTABLE NAUSEA: Status: RESOLVED | Noted: 2025-03-20 | Resolved: 2025-04-23

## 2025-04-23 PROBLEM — M54.50 CHRONIC BILATERAL LOW BACK PAIN WITHOUT SCIATICA: Status: RESOLVED | Noted: 2024-03-19 | Resolved: 2025-04-23

## 2025-04-23 PROBLEM — K52.9 GASTROENTERITIS: Status: RESOLVED | Noted: 2025-03-21 | Resolved: 2025-04-23

## 2025-04-23 PROBLEM — I16.0 HYPERTENSIVE URGENCY: Status: RESOLVED | Noted: 2024-08-05 | Resolved: 2025-04-23

## 2025-04-23 PROBLEM — H02.9 LESION OF RIGHT UPPER EYELID: Status: RESOLVED | Noted: 2024-03-19 | Resolved: 2025-04-23

## 2025-04-23 PROBLEM — R79.89 TROPONIN LEVEL ELEVATED: Status: RESOLVED | Noted: 2024-08-05 | Resolved: 2025-04-23

## 2025-04-23 LAB
ALBUMIN SERPL-MCNC: 4.5 G/DL (ref 3.2–4.8)
ALBUMIN/GLOB SERPL: 1.9 {RATIO} (ref 1–2)
ALP LIVER SERPL-CCNC: 88 U/L (ref 55–142)
ALT SERPL-CCNC: 10 U/L (ref 10–49)
ANION GAP SERPL CALC-SCNC: 13 MMOL/L (ref 0–18)
AST SERPL-CCNC: 19 U/L (ref ?–34)
BASOPHILS # BLD AUTO: 0.03 X10(3) UL (ref 0–0.2)
BASOPHILS NFR BLD AUTO: 0.6 %
BILIRUB SERPL-MCNC: 0.9 MG/DL (ref 0.2–1.1)
BUN BLD-MCNC: 8 MG/DL (ref 9–23)
CALCIUM BLD-MCNC: 9.5 MG/DL (ref 8.7–10.6)
CHLORIDE SERPL-SCNC: 107 MMOL/L (ref 98–112)
CHOLEST SERPL-MCNC: 142 MG/DL (ref ?–200)
CO2 SERPL-SCNC: 24 MMOL/L (ref 21–32)
CREAT BLD-MCNC: 0.89 MG/DL (ref 0.55–1.02)
CREAT UR-SCNC: 93.4 MG/DL
EGFRCR SERPLBLD CKD-EPI 2021: 63 ML/MIN/1.73M2 (ref 60–?)
EOSINOPHIL # BLD AUTO: 0.24 X10(3) UL (ref 0–0.7)
EOSINOPHIL NFR BLD AUTO: 5.1 %
ERYTHROCYTE [DISTWIDTH] IN BLOOD BY AUTOMATED COUNT: 13.2 %
EST. AVERAGE GLUCOSE BLD GHB EST-MCNC: 174 MG/DL (ref 68–126)
FASTING PATIENT LIPID ANSWER: YES
FASTING STATUS PATIENT QL REPORTED: YES
GLOBULIN PLAS-MCNC: 2.4 G/DL (ref 2–3.5)
GLUCOSE BLD-MCNC: 121 MG/DL (ref 70–99)
HBA1C MFR BLD: 7.7 % (ref ?–5.7)
HCT VFR BLD AUTO: 39 % (ref 35–48)
HDLC SERPL-MCNC: 44 MG/DL (ref 40–59)
HGB BLD-MCNC: 12.5 G/DL (ref 12–16)
IMM GRANULOCYTES # BLD AUTO: 0.01 X10(3) UL (ref 0–1)
IMM GRANULOCYTES NFR BLD: 0.2 %
LDLC SERPL CALC-MCNC: 74 MG/DL (ref ?–100)
LYMPHOCYTES # BLD AUTO: 1.45 X10(3) UL (ref 1–4)
LYMPHOCYTES NFR BLD AUTO: 30.9 %
MCH RBC QN AUTO: 29.6 PG (ref 26–34)
MCHC RBC AUTO-ENTMCNC: 32.1 G/DL (ref 31–37)
MCV RBC AUTO: 92.4 FL (ref 80–100)
MICROALBUMIN UR-MCNC: 0.5 MG/DL
MICROALBUMIN/CREAT 24H UR-RTO: 5.4 UG/MG (ref ?–30)
MONOCYTES # BLD AUTO: 0.49 X10(3) UL (ref 0.1–1)
MONOCYTES NFR BLD AUTO: 10.4 %
NEUTROPHILS # BLD AUTO: 2.48 X10 (3) UL (ref 1.5–7.7)
NEUTROPHILS # BLD AUTO: 2.48 X10(3) UL (ref 1.5–7.7)
NEUTROPHILS NFR BLD AUTO: 52.8 %
NONHDLC SERPL-MCNC: 98 MG/DL (ref ?–130)
OSMOLALITY SERPL CALC.SUM OF ELEC: 298 MOSM/KG (ref 275–295)
PLATELET # BLD AUTO: 197 10(3)UL (ref 150–450)
POTASSIUM SERPL-SCNC: 4.2 MMOL/L (ref 3.5–5.1)
PROT SERPL-MCNC: 6.9 G/DL (ref 5.7–8.2)
RBC # BLD AUTO: 4.22 X10(6)UL (ref 3.8–5.3)
SODIUM SERPL-SCNC: 144 MMOL/L (ref 136–145)
TRIGL SERPL-MCNC: 135 MG/DL (ref 30–149)
TSI SER-ACNC: 0.91 UIU/ML (ref 0.55–4.78)
VIT B12 SERPL-MCNC: 532 PG/ML (ref 211–911)
VIT D+METAB SERPL-MCNC: 40.6 NG/ML (ref 30–100)
VLDLC SERPL CALC-MCNC: 21 MG/DL (ref 0–30)
WBC # BLD AUTO: 4.7 X10(3) UL (ref 4–11)

## 2025-04-23 PROCEDURE — 80053 COMPREHEN METABOLIC PANEL: CPT

## 2025-04-23 PROCEDURE — 84443 ASSAY THYROID STIM HORMONE: CPT

## 2025-04-23 PROCEDURE — 82570 ASSAY OF URINE CREATININE: CPT

## 2025-04-23 PROCEDURE — 83036 HEMOGLOBIN GLYCOSYLATED A1C: CPT

## 2025-04-23 PROCEDURE — 99213 OFFICE O/P EST LOW 20 MIN: CPT | Performed by: FAMILY MEDICINE

## 2025-04-23 PROCEDURE — 80061 LIPID PANEL: CPT

## 2025-04-23 PROCEDURE — 82043 UR ALBUMIN QUANTITATIVE: CPT

## 2025-04-23 PROCEDURE — 82306 VITAMIN D 25 HYDROXY: CPT

## 2025-04-23 PROCEDURE — 82607 VITAMIN B-12: CPT

## 2025-04-23 PROCEDURE — 85025 COMPLETE CBC W/AUTO DIFF WBC: CPT

## 2025-04-23 PROCEDURE — G2211 COMPLEX E/M VISIT ADD ON: HCPCS | Performed by: FAMILY MEDICINE

## 2025-04-23 PROCEDURE — G0439 PPPS, SUBSEQ VISIT: HCPCS | Performed by: FAMILY MEDICINE

## 2025-04-23 PROCEDURE — 36415 COLL VENOUS BLD VENIPUNCTURE: CPT

## 2025-04-23 RX ORDER — PROMETHAZINE HYDROCHLORIDE 25 MG/1
25 TABLET ORAL EVERY 8 HOURS PRN
Qty: 40 TABLET | Refills: 3 | Status: SHIPPED | OUTPATIENT
Start: 2025-04-23 | End: 2025-04-23

## 2025-04-23 NOTE — PROGRESS NOTES
Georgia Tabor is a 85 year old female who presents for a Medicare Annual Wellness visit and swelling of the legs gal.    HPI:    Patient Care Team: Patient Care Team:  Peg Carrillo MD as PCP - General (Family Practice)  All Armenta MD (Obstetrics)  Christopher Ryan MD (GASTROENTEROLOGY)  Jose Hayes MD as Consulting Physician (NEUROLOGY)  Jonah Graham MD (CARDIOLOGY)    Pt has bilateral legs swelling, no pain, pts vein specialist would like to do procedure on the pt.    Patient Active Problem List   Diagnosis    Hypothyroid    Medicare annual wellness visit, subsequent    Pure hypercholesterolemia    Primary osteoarthritis of right knee    Benign hypertension    History of breast cancer    Vitamin D deficiency    History of parathyroidectomy    History of hyperparathyroidism    Obesity (BMI 30.0-34.9)    Type 2 diabetes mellitus with hyperglycemia, without long-term current use of insulin ([facility])    Spinal stenosis of lumbar region with neurogenic claudication    Asymptomatic carotid artery stenosis, bilateral    Localized edema    Nonrheumatic aortic (valve) insufficiency    Bilateral carotid artery stenosis       Current Outpatient Medications   Medication Sig Dispense Refill    BUSPIRONE 5 MG Oral Tab Take 1 tablet (5 mg total) by mouth 2 (two) times daily. 180 tablet 3    SERTRALINE 25 MG Oral Tab TAKE ONE TABLET BY MOUTH ONE TIME DAILY 90 tablet 0    gabapentin 100 MG Oral Cap Take 3 capsules by mouth twice a day. 180 capsule 3    ALPRAZolam (XANAX) 0.25 MG Oral Tab Take 1 tablet (0.25 mg total) by mouth nightly as needed. 90 tablet 1    SPIRONOLACTONE 25 MG Oral Tab Take 1/2 tablet (12.5 mg total) by mouth daily 15 tablet 0    LEVOTHYROXINE 50 MCG Oral Tab Take 1 tablet (50 mcg total) by mouth daily 90 tablet 3    mupirocin 2 % External Cream Apply 1 Application topically 2 (two) times daily as needed. 1 each 1    lidocaine 5 % External Patch Place 1 patch onto the skin daily as needed.       losartan Potassium 50 MG Oral Tab Take 0.5 tablets (25 mg total) by mouth daily.  0    carvedilol 6.25 MG Oral Tab Take 1 tablet (6.25 mg total) by mouth 2 (two) times daily. 60 tablet 3    aspirin EC 81 MG Oral Tab EC Take 1 tablet (81 mg total) by mouth every evening.      Vitamin D3 25 MCG (1000 UT) Oral Tab Take 1 tablet (1,000 Units total) by mouth daily.      simvastatin 20 MG Oral Tab Take 3 tablets (60 mg total) by mouth nightly. Take with 40 mg tablet to total 60 mg nightly.      Calcium Carbonate-Vitamin D 600-125 MG-UNIT Oral Tab Take 1 tablet by mouth daily.        Coenzyme Q10 (COQ10) 100 MG Oral Cap Take 1 tablet by mouth daily.        Glucosamine-Fish Oil-EPA-DHA (GLUCOSAMINE & FISH OIL OR) Take 1 capsule by mouth daily.        vitamin E 400 UNITS Oral Cap Take 1 capsule (400 Units total) by mouth daily.       Wt Readings from Last 6 Encounters:   04/23/25 172 lb (78 kg)   03/20/25 170 lb (77.1 kg)   09/24/24 172 lb (78 kg)   08/08/24 178 lb (80.7 kg)   08/06/24 178 lb 12.7 oz (81.1 kg)   05/08/24 174 lb (78.9 kg)     Body mass index is 31.46 kg/m².    No results found for: \"GLUCOSE\"  Lab Results   Component Value Date    CHOLEST 166 09/24/2024    CHOLEST 176 08/05/2024    CHOLEST 159 03/19/2024     Lab Results   Component Value Date    HDL 49 09/24/2024    HDL 57 08/05/2024    HDL 54 03/19/2024     No results found for: \"TRIGLY\"  Lab Results   Component Value Date    LDL 93 09/24/2024    LDL 96 08/05/2024    LDL 78 03/19/2024     Lab Results   Component Value Date    AST 19 03/20/2025    AST 20 09/24/2024    AST 21 08/08/2024     Lab Results   Component Value Date    ALT 10 03/20/2025    ALT 14 09/24/2024    ALT 13 08/08/2024     Lab Results   Component Value Date    TSH 0.786 09/24/2024    TSH 2.090 03/13/2023    TSH 1.030 03/04/2022     Lab Results   Component Value Date    BUN 11 03/20/2025    BUN 7 (L) 09/24/2024    BUN 9 08/08/2024    CREATSERUM 0.89 03/20/2025    CREATSERUM 0.84 09/24/2024     CREATSERUM 0.88 08/08/2024       General Health                                                       Fall/Risk Assessment        No fall.        Depression Screening (PHQ-2/PHQ-9): Over the LAST 2 WEEKS      No depression                Advance Directives                Please go to \"Cognitive Assessment\" under Medicare Assessment section in Charting, test patient and document.    Then, refresh your progress note to see your input here.  Cognitive Assessment     What day of the week is this?: Correct    What month is it?: Correct    What year is it?: Correct    Recall \"Ball\": Correct    Recall \"Flag\": Correct    Recall \"Tree\": Correct              PREVENTATIVE SERVICES  INDICATIONS AND SCHEDULE Internal Lab or Procedure External Lab or Procedure   Diabetes Screening      HbgA1C   Annually HgbA1C (%)   Date Value   03/19/2024 7.7 (H)         No data to display                Fasting Blood Sugar (FSB)Annually Glucose (mg/dL)   Date Value   03/20/2025 173 (H)     GLUCOSE (mg/dL)   Date Value   04/14/2014 92       Cardiovascular Disease Screening     LDL Annually LDL Cholesterol (mg/dL)   Date Value   09/24/2024 93     LDL CHOLESTROL (mg/dL)   Date Value   01/16/2014 94        EKG - w/ Initial Preventative Physical Exam only, or if medically necessary Up to date    Colorectal Cancer Screening      Colonoscopy Screen every 10 years Health Maintenance   Topic Date Due    Colorectal Cancer Screening  Discontinued    Update Health Maintenance if applicable    Flex Sigmoidoscopy Screen every 5 years No results found for this or any previous visit.      No data to display                 Fecal Occult Blood Annually No results found for: \"FOB\", \"OCCULTSTOOL\"      No data to display                Glaucoma Screening      Ophthalmology Visit Annually yes    Bone Density Screening      Dexascan Every two years       No data to display                Results for orders placed or performed during the hospital encounter of 10/18/21    XR DEXA BONE DENSITOMETRY (CPT=77080)    Narrative    PROCEDURE:  XR DEXA BONE DENSITOMETRY (CPT=77080)     COMPARISON:  None.     INDICATIONS:    E55.9 Vitamin D deficiency Z78.0 Asymptomatic menopausal state     PATIENT STATED HISTORY: (As transcribed by Technologist)   Asymptomatic menopausal state diagnosis. Dexa scan for bone density evaluation.           LUMBAR SPINE ANALYSIS RESULTS:      Bone mineral density (BMD) (g/cm2):  1.150    Lumbar T-Score:  0.9      % young normals:  110      % age matched controls:  154      Change from prior spine examination:  1.5%               TOTAL HIP ANALYSIS RESULTS:        Bone mineral density (BMD) (g/cm2):  0.942      Total Hip T-Score:  0.0      % young normals:  100      % age matched controls:  139      Change from prior hip examination:  -0.6%               FEMORAL NECK ANALYSIS RESULTS:        Bone mineral density (BMD) (g/cm2):  0.936      Femoral neck T-Score:  -0.1      % young normals:  98      % age matched controls:  143      Change from prior hip examination:  5.6%                               Impression    CONCLUSION:  Bone mineral density values are within normal range when compared to normal patient population.           The World Health Organization has defined the following categories based on bone density:  Normal bone:  T-score better than -1  Osteopenia: T-score between -1 and -2.5  Osteoporosis:  T-score less than -2.5           Dictated by (CST): Vanessa Jose MD on 10/18/2021 at 5:19 PM       Finalized by (CST): Vanessa Jose MD on 10/18/2021 at 5:19 PM         Pap and Pelvic      Pap: Every 3 yrs age 21-65 or Pap+HPV every 5 yrs age 30-65, age 65 and older at high risk   No recommendations at this time Update Health Maintenance if applicable    Chlamydia  Annually if high risk No results found for: \"CHLAMYDIA\"      No data to display                Screening Mammogram      Mammogram  Annually No recommendations at this time Update Health  Maintenance if applicable   Immunizations      Influenza No orders found for this or any previous visit. Update Immunization Activity if applicable    Pneumococcal Orders placed or performed in visit on 04/29/15    Pneumococcal (Prevnar 13) (93403) (DX V03.82)    Update Immunization Activity if applicable    Hepatitis B No orders found for this or any previous visit. Update Immunization Activity if applicable    Tetanus Orders placed or performed in visit on 04/06/16    Td (Tenivac) (55304) (DX V06.5/Z23)    Update Immunization Activity if applicable    Zoster (Not covered by Medicare Part B) No orders found for this or any previous visit. Update Immunization Activity if applicable         SPECIFIC DISEASE MONITORING Internal Lab or Procedure External Lab or Procedure   Annual Monitoring of Persistent     Medications (ACE/ARB, digoxin, diuretics)    Potassium  Annually Potassium (mmol/L)   Date Value   03/20/2025 3.7     POTASSIUM (mmol/L)   Date Value   04/14/2014 3.8   04/18/2012 3.9         No data to display                Creatinine  Annually CREATININE (mg/dL)   Date Value   04/14/2014 0.57     Creatinine (mg/dL)   Date Value   03/20/2025 0.89         No data to display                Digoxin Serum Conc  Annually No results found for: \"DIGOXIN\"      No data to display                Diabetes      HgbA1C  Annually HgbA1C (%)   Date Value   03/19/2024 7.7 (H)         No data to display                Creat/alb ratio  Annually      LDL  Annually LDL Cholesterol (mg/dL)   Date Value   09/24/2024 93     LDL CHOLESTROL (mg/dL)   Date Value   01/16/2014 94         No data to display                 Dilated Eye exam  Annually      No data to display                   No data to display                COPD      Spirometry Testing Annually No results found for this or any previous visit.      No data to display                    ALLERGIES:     Allergies   Allergen Reactions    Prochlorperazine CONFUSION      confusion  TABS; acting goofy      Adhesive Tape OTHER (SEE COMMENTS)     Skin ripped    Compazine     Compazine      TABS; acting goofy         CURRENT MEDICATIONS:   Current Outpatient Medications   Medication Sig Dispense Refill    BUSPIRONE 5 MG Oral Tab Take 1 tablet (5 mg total) by mouth 2 (two) times daily. 180 tablet 3    SERTRALINE 25 MG Oral Tab TAKE ONE TABLET BY MOUTH ONE TIME DAILY 90 tablet 0    gabapentin 100 MG Oral Cap Take 3 capsules by mouth twice a day. 180 capsule 3    ALPRAZolam (XANAX) 0.25 MG Oral Tab Take 1 tablet (0.25 mg total) by mouth nightly as needed. 90 tablet 1    SPIRONOLACTONE 25 MG Oral Tab Take 1/2 tablet (12.5 mg total) by mouth daily 15 tablet 0    LEVOTHYROXINE 50 MCG Oral Tab Take 1 tablet (50 mcg total) by mouth daily 90 tablet 3    mupirocin 2 % External Cream Apply 1 Application topically 2 (two) times daily as needed. 1 each 1    lidocaine 5 % External Patch Place 1 patch onto the skin daily as needed.      losartan Potassium 50 MG Oral Tab Take 0.5 tablets (25 mg total) by mouth daily.  0    carvedilol 6.25 MG Oral Tab Take 1 tablet (6.25 mg total) by mouth 2 (two) times daily. 60 tablet 3    aspirin EC 81 MG Oral Tab EC Take 1 tablet (81 mg total) by mouth every evening.      Vitamin D3 25 MCG (1000 UT) Oral Tab Take 1 tablet (1,000 Units total) by mouth daily.      simvastatin 20 MG Oral Tab Take 3 tablets (60 mg total) by mouth nightly. Take with 40 mg tablet to total 60 mg nightly.      Calcium Carbonate-Vitamin D 600-125 MG-UNIT Oral Tab Take 1 tablet by mouth daily.        Coenzyme Q10 (COQ10) 100 MG Oral Cap Take 1 tablet by mouth daily.        Glucosamine-Fish Oil-EPA-DHA (GLUCOSAMINE & FISH OIL OR) Take 1 capsule by mouth daily.        vitamin E 400 UNITS Oral Cap Take 1 capsule (400 Units total) by mouth daily.        MEDICAL INFORMATION:   Past Medical History:    Acute bronchitis    Acute maxillary sinusitis    Anxiety state, unspecified    Arthritis    Back  problem    Breast CA (HCC)    Calculus of kidney    Cancer (HCC)    Cancer, colon (HCC)    Exposure to unspecified radiation    2008/breast    Fatigue    HIGH BLOOD PRESSURE    HIGH CHOLESTEROL    Hyperparathyroidism (HCC)    Hypothyroid    Malignant neoplasm of breast (female), unspecified site    Myocardial infarction (HCC)    Other and unspecified hyperlipidemia    Pain in joint, lower leg    Unspecified disorder of thyroid    Unspecified essential hypertension    Valvular disease      Past Surgical History:   Procedure Laterality Date    Breast surgery procedure unlisted      lumpectomy, right    Cataract      Cataract surgery, complex Bilateral 2021    Cholecystectomy  1974    Colon surgery      removal od cancerous lesion    Colonoscopy      Hysterectomy      not bso    Lumpectomy right  2008    Invasive and DCIS    Chanel biopsy stereo nodule 1 site right (cpt=19081)      DCIS    Nm parathyroid surgery  (cpt=78070)            Other  2006    lithotripsy    Other      parathyroid resection    Other surgical history N/A 2015    Procedure: ANTERIOR POSTERIOR REPAIR;  Surgeon: All Armenta MD;  Location: [facility] MAIN OR    Radiation right      Thyroidectomy        Family History   Problem Relation Age of Onset    Heart Disorder Father     Other (acute MI) Father     Heart Disease Father     Other (amyotrophic lateral sclerosis) Mother     Other (ALS) Mother     Other (CABG) Sister     Diabetes Sister     Cancer Sister     Obesity Sister     Heart Disease Sister     Heart Disease Sister     Breast Cancer Self 68    Heart Disorder Brother       SOCIAL HISTORY:   Social History     Socioeconomic History    Marital status:    Tobacco Use    Smoking status: Never    Smokeless tobacco: Never   Vaping Use    Vaping status: Never Used   Substance and Sexual Activity    Alcohol use: Yes     Comment: holidays    Drug use: Never   Other Topics Concern    Caffeine Concern No    Stress  Concern No    Weight Concern No    Special Diet No    Exercise Yes    Seat Belt Yes   Social History Narrative    ** Merged History Encounter **          Social Drivers of Health     Food Insecurity: No Food Insecurity (3/21/2025)    NCSS - Food Insecurity     Worried About Running Out of Food in the Last Year: No     Ran Out of Food in the Last Year: No   Transportation Needs: No Transportation Needs (3/21/2025)    NCSS - Transportation     Lack of Transportation: No   Housing Stability: Not At Risk (3/21/2025)    NCSS - Housing/Utilities     Has Housing: Yes     Worried About Losing Housing: No     Unable to Get Utilities: No     Occ: retired  : yes        REVIEW OF SYSTEMS:   GENERAL: feels well otherwise  SKIN: denies any unusual skin lesions  EYES: denies blurred vision or double vision  HEENT: denies nasal congestion, sinus pain or ST  LUNGS: denies shortness of breath with exertion  CARDIOVASCULAR: denies chest pain on exertion  GI: denies abdominal pain, denies heartburn  : denies dysuria, vaginal discharge or itching, no complaint of urinary incontin   MUSCULOSKELETAL: denies back pain  NEURO: denies headaches  PSYCHE: denies depression or anxiety  HEMATOLOGIC: denies hx of anemia  ENDOCRINE: denies thyroid history  ALL/ASTHMA: denies hx of allergy or asthma    EXAM:   /60   Pulse 53   Ht 5' 2\" (1.575 m)   Wt 172 lb (78 kg)   LMP 04/25/1985   SpO2 98%   BMI 31.46 kg/m²      >   BP Readings from Last 3 Encounters:   04/23/25 118/60   03/21/25 104/57   09/24/24 138/80        GENERAL: well developed, well nourished, in no apparent distress  SKIN: no rashes, no suspicious lesions  HEENT: atraumatic, normocephalic, ears and throat are clear                Hearing Assessed via: Whispered Voice  EYES: PERRLA, EOMI, conjunctiva are clear, R upper eyelid:small lump.  NECK: supple, no adenopathy, no bruits  CHEST: no chest tenderness  BREAST: no masses, no discharge or no axillary lymphadenopathy    LUNGS: clear to auscultation  CARDIO: RRR without murmur  GI: good BS's, no masses, HSM or tenderness  : deferred  RECTAL: deferred  MUSCULOSKELETAL: limited ROM of the lower back.  EXTREMITIES: no cyanosis, clubbing, bilateral edema non pitting, left leg more than right.  NEURO: Oriented times three, cranial nerves are intact, motor and sensory are grossly intact          ASSESSMENT AND OTHER of PLAN RELEVANT CHRONIC CONDITIONS:   Georgia Tabor is a 85 year old female who presents for a Medicare Assessment.         Type 2 diabetes mellitus with hyperglycemia, without long-term current use of insulin (HCC):   low sugar, low salt and  lipid diet, exercise 3 times a week d/w the pt.  Doing well.      Acquired hypothyroidism  - TSH; Future  Doing well.    Primary osteoarthritis of right knee: falling risk d/w the pt.  - Stable. Pt is active.    Vitamin D deficiency  - VITAMIN D, 25-HYDROXY; Future  Vit. D 2000U a day. Stable.    Pure hypercholesterolemia  - LIPID PANEL; Future  - CMP; Future  Cont. Present meds.  ASA 81mg a day.   low sugar, low salt and  lipid diet, exercise 3 times a week d/w the pt.  Sees Dr. Graham, cardio.    Benign hypertension:  ASA 81mg a day.   low sugar, low salt and  lipid diet, exercise 3 times a week d/w the pt.  Sees Dr. Graham, cardio.stable.    History of breast cancer: doing well.No mammograms.      History of parathyroidectomy,History of hyperparathyroidism  - Doing well,  Dexa up to date. CMP.        Nonrheumatic aortic (valve) insufficiency: Dr. Graham f/u, asymptomatic.    Asymptomatic carotid artery stenosis, bilateral: Dr. Graham f/u, no symptoms.      Obesity (BMI 30.0-34.9):    low sugar, low salt and  lipid diet, exercise 3 times a week d/w the pt. Stable.    Localized edema; stable, doing well. Pt was advised to see Dr. Ramirez again before procedure and discussed necessity for it if asymptomatic.MR from [facility] reviewed, total minutes spent on the subject: 20.      Spinal  stenosis of lumbar region with neurogenic claudication    -     doing well, cont. Staying active.         The patient indicates understanding of these issues and agrees to the plan.  The patient is asked to return in  6 months for f/u.    Orders Placed This Encounter   Procedures    Hemoglobin A1C     Standing Status:   Future     Number of Occurrences:   1     Expected Date:   4/23/2025     Expiration Date:   4/22/2026    Microalb/Creat Ratio, Random Urine     Standing Status:   Future     Number of Occurrences:   1     Expected Date:   4/23/2025     Expiration Date:   4/22/2026    CBC With Differential With Platelet     Standing Status:   Future     Number of Occurrences:   1     Expected Date:   4/23/2025     Expiration Date:   4/23/2026    Comp Metabolic Panel (14)     Standing Status:   Future     Number of Occurrences:   1     Expected Date:   4/23/2025     Expiration Date:   4/23/2026    Lipid Panel     Standing Status:   Future     Number of Occurrences:   1     Expected Date:   4/23/2025     Expiration Date:   4/23/2026    Assay, Thyroid Stim Hormone     Standing Status:   Future     Number of Occurrences:   1     Expected Date:   4/23/2025     Expiration Date:   4/23/2026    Vitamin B12     Standing Status:   Future     Number of Occurrences:   1     Expected Date:   4/23/2025     Expiration Date:   4/23/2026    Vitamin D     Standing Status:   Future     Number of Occurrences:   1     Expected Date:   4/23/2025     Expiration Date:   4/23/2026     Please pick the scenario that best fits the purpose for ordering this test:   General Screening/Vit D deficiency (25-Hydroxy)     Release to patient:   Immediate     VACCINATIONS - Influenza, Pneumococcal, Zoster, Tetanus     Immunization History   Administered Date(s) Administered    Covid-19 Vaccine Pfizer 30 mcg/0.3 ml 02/15/2021, 03/08/2021, 12/17/2021    FLU VAC High Dose 65 YRS & Older PRSV Free (87728) 12/13/2017, 10/06/2020, 11/02/2022    FLUAD High Dose  65 yr and older (31229) 12/13/2017    FLUZONE 6 months and older PFS 0.5 ml (22796) 09/28/2015    Flublok Quad Influenza Vaccine (21723) 12/17/2021    Fluvirin, 3 Years & >, Im 10/01/2013    Fluzone Vaccine Medicare () 01/23/2017, 10/06/2020    High Dose Fluzone Influenza Vaccine, 65yr+ PF 0.5mL (19991) 10/14/2014, 01/24/2017, 11/02/2022, 09/24/2024    Influenza 12/28/2007, 10/20/2013, 10/08/2014, 01/24/2017    Pneumococcal (Prevnar 13) 04/29/2015    Pneumovax 23 12/28/2007    TD 04/07/2016    Td, Preserv Free 04/06/2016

## 2025-04-28 ENCOUNTER — TELEPHONE (OUTPATIENT)
Dept: FAMILY MEDICINE CLINIC | Facility: CLINIC | Age: 85
End: 2025-04-28

## 2025-04-28 NOTE — TELEPHONE ENCOUNTER
Patient's daughter called to state that patient had seen Dr. Carrillo on 4/23/25 and was going to order something for anxiety.  I do not see any new medications that were sent that day, nor is there documentation stating so. Advised will sent Dr. Carrillo a message requesting anxiety medication    Dr. Carrillo - patient requesting anxiety medication, states it was discussed at 4/23/25 visit. There is no note of it.

## 2025-04-29 NOTE — TELEPHONE ENCOUNTER
Called and spoke to pt's daughter, Kristen. She states pt is currently taking buspirone and sertraline as prescribed and pt is sleeping well.     ARI Carrillo

## 2025-04-30 NOTE — TELEPHONE ENCOUNTER
Pharmacy told me she can not take it now because of interaction, will see how pt will feel in the future.

## 2025-04-30 NOTE — TELEPHONE ENCOUNTER
Called and spoke with pt's daughter, Kristen (Ok per HIPAA), to inform as per PCP. Confirmed does have xanax on hand.   Daughter indicated had discussed with PCP a med for dry heaving to take when pt's BP is high.  Informed will relay to PCP rx request. Confirmd [facility] on file.   Daughter verbalized understanding and agreed with POC.

## 2025-05-06 RX ORDER — LEVOTHYROXINE SODIUM 50 UG/1
50 TABLET ORAL DAILY
Qty: 90 TABLET | Refills: 3 | Status: SHIPPED | OUTPATIENT
Start: 2025-05-06

## 2025-05-06 NOTE — TELEPHONE ENCOUNTER
Refill Per Protocol     Requested Prescriptions   Pending Prescriptions Disp Refills    LEVOTHYROXINE 50 MCG Oral Tab [Pharmacy Med Name: Levothyroxine Sodium 50 Mcg Tab Lupi] 90 tablet 0     Sig: Take 1 tablet (50 mcg total) by mouth daily       Thyroid Medication Protocol Passed - 5/6/2025  8:34 AM        Passed - TSH in past 12 months        Passed - Last TSH value is normal     Lab Results   Component Value Date    TSH 0.908 04/23/2025                 Passed - In person appointment or virtual visit in the past 12 mos or appointment in next 3 mos     Recent Outpatient Visits              1 week ago Medicare annual wellness visit, subsequent    [facility], Peg Keller MD    Office Visit    7 months ago Pure hypercholesterolemia    [facility], Peg Keller MD    Office Visit    12 months ago Lumbar stenosis with neurogenic claudication    [facility], [address] [place], [place] [address]Aston Rivers DO    Office Visit    1 year ago Lumbar stenosis with neurogenic claudication    [facility], [address] [place] [address] Aston Gonzalez DO    Office Visit    1 year ago Medicare annual wellness visit, subsequent    [facility], Peg Keller MD    Office Visit                      Passed - Medication is active on med list

## 2025-06-01 DIAGNOSIS — M48.062 LUMBAR STENOSIS WITH NEUROGENIC CLAUDICATION: ICD-10-CM

## 2025-06-02 RX ORDER — GABAPENTIN 100 MG/1
300 CAPSULE ORAL 2 TIMES DAILY
Qty: 180 CAPSULE | Refills: 11 | Status: SHIPPED | OUTPATIENT
Start: 2025-06-02

## 2025-06-02 NOTE — TELEPHONE ENCOUNTER
Refill passed per [facility] protocols.    Requested Prescriptions   Pending Prescriptions Disp Refills    GABAPENTIN 100 MG Oral Cap [Pharmacy Med Name: Gabapentin 100 Mg Cap Nort] 180 capsule 11     Sig: Take 3 capsules by mouth twice a day       Neurology Medications Passed - 6/2/2025  6:53 PM

## 2025-06-16 RX ORDER — SERTRALINE HYDROCHLORIDE 25 MG/1
25 TABLET, FILM COATED ORAL DAILY
Qty: 90 TABLET | Refills: 3 | Status: SHIPPED | OUTPATIENT
Start: 2025-06-16

## 2025-07-29 ENCOUNTER — NURSE TRIAGE (OUTPATIENT)
Dept: FAMILY MEDICINE CLINIC | Facility: CLINIC | Age: 85
End: 2025-07-29

## 2025-07-29 DIAGNOSIS — G56.00 CARPAL TUNNEL SYNDROME, UNSPECIFIED LATERALITY: Primary | ICD-10-CM

## 2025-08-20 ENCOUNTER — MED REC SCAN ONLY (OUTPATIENT)
Dept: FAMILY MEDICINE CLINIC | Facility: CLINIC | Age: 85
End: 2025-08-20

## (undated) DIAGNOSIS — M54.31 BILATERAL SCIATICA: Primary | ICD-10-CM

## (undated) DIAGNOSIS — F41.9 ANXIETY: ICD-10-CM

## (undated) DIAGNOSIS — M54.32 BILATERAL SCIATICA: Primary | ICD-10-CM

## (undated) DIAGNOSIS — E78.00 PURE HYPERCHOLESTEROLEMIA: ICD-10-CM

## (undated) DIAGNOSIS — E11.65 TYPE 2 DIABETES MELLITUS WITH HYPERGLYCEMIA, WITHOUT LONG-TERM CURRENT USE OF INSULIN (HCC): Primary | ICD-10-CM

## (undated) DEVICE — Device

## (undated) DEVICE — GLOVE SURG SENSICARE SZ 7-1/2

## (undated) DEVICE — GLOVE SURG SENSICARE SZ 6-1/2

## (undated) DEVICE — PAIN TRAY: Brand: MEDLINE INDUSTRIES, INC.

## (undated) DEVICE — BANDAID CURAD 3IN X 1IN

## (undated) DEVICE — REMOVER PREP SOLUTION 4OZ

## (undated) DEVICE — RF CANNULA, CVD: Brand: VENOM

## (undated) DEVICE — NEEDLE SPINAL 22X5 405148

## (undated) DEVICE — BANDAGE ADH COVERLET 3X1IN

## (undated) DEVICE — SKIN MARKER DUAL TIP WITH RULER CAP AND LABELS: Brand: DEVON

## (undated) DEVICE — GLOVE SURG SENSICARE SZ 7

## (undated) DEVICE — AVANOS* TUOHY EPIDURAL NEEDLE: Brand: AVANOS

## (undated) DEVICE — MEGADYNE ELECTRODE ADULT PT RT

## (undated) DEVICE — SHEET, T, LAPAROTOMY, STERILE: Brand: MEDLINE

## (undated) DEVICE — NEEDLE SPINAL 22X3-1/2 BLK

## (undated) NOTE — LETTER
WHERE IS YOUR PAIN NOW?  Zina the areas on your body where you feel the described sensations.  Use the appropriate symbol.  Zina the areas of radiation.  Include all affected areas.  Just to complete the picture, please draw in the face.     ACHE:  ^ ^ ^   NUMBNESS:  0000   PINS & NEEDLES:  = = = =                              ^ ^ ^                       0000              = = = =                                    ^ ^ ^                       0000            = = = =      BURNING:  XXXX   STABBING: ////                  XXXX                ////                         XXXX          ////     Please zina the line below indicating your degree of pain right now  with 0 being no pain 10 being the worst pain possible.                                         0             1             2              3             4              5              6              7             8             9             10         Patient Signature:

## (undated) NOTE — MR AVS SNAPSHOT
After Visit Summary   1/31/2023    Chuy Coelho   MRN: [id number]           Visit Information     Date & Time  1/31/2023  1:00 PM Provider  Hazel Baer, [address] [place] [address] Neurodiagnostics Dept. Phone  [phone] Were     LMP   04/25/1985             Allergies as of 1/31/2023  Review status set to Review Complete on 1/26/2023       Noted Reaction Type Reactions    Prochlorperazine 01/19/2011    CONFUSION    confusion  TABS; acting goofy    Adhesive Tape 09/29/2021    OTHER (SEE COMMENTS)    Skin ripped    Compazine 01/19/2011        Compazine 04/18/2012        TABS; acting goofy      Your Current Medications        Dosage    LEVOTHYROXINE 50 MCG Oral Tab TAKE ONE TABLET BY MOUTH ONE TIME DAILY    SERTRALINE 25 MG Oral Tab Take 1 tablet (25 mg total) by mouth daily. Mupirocin Calcium 2 % External Cream Apply 1 Application topically 2 (two) times daily. busPIRone 5 MG Oral Tab Take 1 tablet (5 mg total) by mouth 2 (two) times daily. ALPRAZolam (XANAX) 0.25 MG Oral Tab Take 1 tablet (0.25 mg total) by mouth daily as needed. spironolactone 25 MG Oral Tab Take 0.5 tablets by mouth daily. Blood Glucose Monitoring Suppl ([address]) w/Device Does not apply Kit Onetouch Verio Flex System to Check glucose daily. Glucose Blood (ONETOUCH VERIO) In Vitro Strip OneTouch Verio test strip to Check glucose daily before breakfast    OneTouch Delica Lancets 15M Does not apply Misc 1 lancet by Finger stick route every morning before breakfast.    losartan Potassium 50 MG Oral Tab Take 25 mg by mouth daily. carvedilol 6.25 MG Oral Tab Take 1 tablet (6.25 mg total) by mouth 2 (two) times daily. aspirin EC 81 MG Oral Tab EC Take 81 mg by mouth every evening. Vitamin D3 25 MCG (1000 UT) Oral Tab Take 1,000 Units by mouth daily. simvastatin 20 MG Oral Tab Take 60 mg by mouth nightly. simvastatin 40 MG Oral Tab Take 60 mg by mouth nightly. Calcium Carbonate-Vitamin D 600-125 MG-UNIT Oral Tab Take 1 tablet by mouth daily. Coenzyme Q10 (COQ10) 100 MG Oral Cap Take 1 tablet by mouth daily. Glucosamine-Fish Oil-EPA-DHA (GLUCOSAMINE & FISH OIL OR) Take 1 capsule by mouth daily. vitamin E 400 UNITS Oral Cap Take 400 Units by mouth daily. Diagnoses for This Visit    Numbness of hand   [[id number]]             We Ordered the Following     Normal Orders This Visit    EMG [NEU5 CUSTOM]       Future Appointments        Provider Department    2/21/2023 11:00 AM Angeliquealhaji Washburnyer, [facility]    2/28/2023 1:40 PM Ellis, [address] [place] [address]    3/13/2023 1:00 PM Peg Carrillo [address]                Did you know that [facility] primary care physicians now offer Video Visits through [address] for adult patients for a variety of conditions such as allergies, back pain and cold symptoms? Skip the drive and waiting room and online chat with a doctor face-to-face using your web-cam enabled computer or mobile device wherever you are. Video Visits cost $50 and can be paid hassle-free using a credit, debit, or health savings card. Not active on [employer]? Ask us how to get signed up today! If you receive a survey from [employer], please take a few minutes to complete it and provide feedback. We strive to deliver the best patient experience and are looking for ways to make improvements. Your feedback will help us do so. For more information on [employer], please visit [email or web address]           No text in SmartText           No text in SmartText

## (undated) NOTE — Clinical Note
Initial assessment completed with patient.  Appointment scheduled for 4/23/25, pt declined sooner appt, declined TCC. message sent to office to assist in scheduling.  Thank you!

## (undated) NOTE — LETTER
7/1/2020  Georgia Tabor  [address] [facility]    We take each of our patient's health very seriously and the key to maintaining your health is an annual wellness physical.  Review of your medical records shows that it is time for your annua